# Patient Record
Sex: MALE | Race: WHITE | Employment: UNEMPLOYED | URBAN - METROPOLITAN AREA
[De-identification: names, ages, dates, MRNs, and addresses within clinical notes are randomized per-mention and may not be internally consistent; named-entity substitution may affect disease eponyms.]

---

## 2017-01-11 DIAGNOSIS — Z79.01 ANTICOAGULANT LONG-TERM USE: ICD-10-CM

## 2017-01-11 LAB
INR BLD: 2.5
PROTHROMBIN TIME: 28.2 SEC (ref 8.1–13.7)

## 2017-01-18 ENCOUNTER — TELEPHONE (OUTPATIENT)
Dept: PRIMARY CARE CLINIC | Age: 42
End: 2017-01-18

## 2017-01-30 ENCOUNTER — OFFICE VISIT (OUTPATIENT)
Dept: SURGERY | Age: 42
End: 2017-01-30

## 2017-01-30 VITALS
SYSTOLIC BLOOD PRESSURE: 136 MMHG | HEART RATE: 72 BPM | DIASTOLIC BLOOD PRESSURE: 86 MMHG | HEIGHT: 68 IN | BODY MASS INDEX: 26.07 KG/M2 | WEIGHT: 172 LBS

## 2017-01-30 DIAGNOSIS — E10.42 DIABETIC POLYNEUROPATHY ASSOCIATED WITH TYPE 1 DIABETES MELLITUS (HCC): ICD-10-CM

## 2017-01-30 DIAGNOSIS — E10.9 TYPE 1 DIABETES MELLITUS WITHOUT COMPLICATION (HCC): Primary | ICD-10-CM

## 2017-01-30 PROCEDURE — 99213 OFFICE O/P EST LOW 20 MIN: CPT | Performed by: INTERNAL MEDICINE

## 2017-01-30 PROCEDURE — 1036F TOBACCO NON-USER: CPT | Performed by: INTERNAL MEDICINE

## 2017-01-30 PROCEDURE — 82962 GLUCOSE BLOOD TEST: CPT | Performed by: INTERNAL MEDICINE

## 2017-01-30 PROCEDURE — 3045F PR MOST RECENT HEMOGLOBIN A1C LEVEL 7.0-9.0%: CPT | Performed by: INTERNAL MEDICINE

## 2017-01-30 PROCEDURE — G8427 DOCREV CUR MEDS BY ELIG CLIN: HCPCS | Performed by: INTERNAL MEDICINE

## 2017-01-30 PROCEDURE — G8419 CALC BMI OUT NRM PARAM NOF/U: HCPCS | Performed by: INTERNAL MEDICINE

## 2017-01-30 PROCEDURE — G8484 FLU IMMUNIZE NO ADMIN: HCPCS | Performed by: INTERNAL MEDICINE

## 2017-01-30 RX ORDER — GABAPENTIN 300 MG/1
300 CAPSULE ORAL 3 TIMES DAILY
Qty: 90 CAPSULE | Refills: 6 | Status: SHIPPED | OUTPATIENT
Start: 2017-01-30 | End: 2021-04-06

## 2017-01-30 ASSESSMENT — ENCOUNTER SYMPTOMS: VISUAL CHANGE: 1

## 2017-02-28 ENCOUNTER — HOSPITAL ENCOUNTER (OUTPATIENT)
Dept: ULTRASOUND IMAGING | Age: 42
Discharge: HOME | End: 2017-02-28
Payer: COMMERCIAL

## 2017-02-28 DIAGNOSIS — I77.1 ILIAC ARTERY STENOSIS, BILATERAL (HCC): ICD-10-CM

## 2017-02-28 DIAGNOSIS — Z79.01 ANTICOAGULANT LONG-TERM USE: ICD-10-CM

## 2017-02-28 LAB
INR BLD: 3.8
PROTHROMBIN TIME: 42.4 SEC (ref 8.1–13.7)

## 2017-02-28 PROCEDURE — 93923 UPR/LXTR ART STDY 3+ LVLS: CPT

## 2017-03-01 PROCEDURE — 93923 UPR/LXTR ART STDY 3+ LVLS: CPT | Performed by: INTERNAL MEDICINE

## 2017-03-07 ENCOUNTER — TELEPHONE (OUTPATIENT)
Dept: PRIMARY CARE CLINIC | Age: 42
End: 2017-03-07

## 2017-03-07 ENCOUNTER — ANTI-COAG VISIT (OUTPATIENT)
Dept: PRIMARY CARE CLINIC | Age: 42
End: 2017-03-07

## 2017-03-28 DIAGNOSIS — Z79.01 ANTICOAGULANT LONG-TERM USE: ICD-10-CM

## 2017-03-28 LAB
INR BLD: 2.3
PROTHROMBIN TIME: 24.9 SEC (ref 8.1–13.7)

## 2017-03-29 ENCOUNTER — ANTI-COAG VISIT (OUTPATIENT)
Dept: PRIMARY CARE CLINIC | Age: 42
End: 2017-03-29

## 2017-04-03 DIAGNOSIS — E78.00 HYPERCHOLESTEREMIA: ICD-10-CM

## 2017-04-03 RX ORDER — LISINOPRIL 10 MG/1
TABLET ORAL
Qty: 90 TABLET | Refills: 3 | Status: SHIPPED | OUTPATIENT
Start: 2017-04-03 | End: 2018-02-12 | Stop reason: SDUPTHER

## 2017-05-19 DIAGNOSIS — Z79.01 ANTICOAGULANT LONG-TERM USE: ICD-10-CM

## 2017-05-19 LAB
INR BLD: 2.4
PROTHROMBIN TIME: 26.4 SEC (ref 8.1–13.7)

## 2017-05-24 ENCOUNTER — ANTI-COAG VISIT (OUTPATIENT)
Dept: PRIMARY CARE CLINIC | Age: 42
End: 2017-05-24

## 2017-05-26 DIAGNOSIS — E78.00 HYPERCHOLESTEREMIA: ICD-10-CM

## 2017-05-26 RX ORDER — SIMVASTATIN 20 MG
TABLET ORAL
Qty: 30 TABLET | Refills: 6 | Status: SHIPPED | OUTPATIENT
Start: 2017-05-26 | End: 2018-02-12 | Stop reason: SDUPTHER

## 2017-06-08 ENCOUNTER — OFFICE VISIT (OUTPATIENT)
Dept: PRIMARY CARE CLINIC | Age: 42
End: 2017-06-08

## 2017-06-08 VITALS
SYSTOLIC BLOOD PRESSURE: 122 MMHG | HEART RATE: 76 BPM | RESPIRATION RATE: 14 BRPM | HEIGHT: 68 IN | BODY MASS INDEX: 26.42 KG/M2 | OXYGEN SATURATION: 99 % | WEIGHT: 174.3 LBS | DIASTOLIC BLOOD PRESSURE: 82 MMHG | TEMPERATURE: 98.3 F

## 2017-06-08 DIAGNOSIS — I63.89 CEREBROVASCULAR ACCIDENT (CVA) DUE TO OTHER MECHANISM (HCC): ICD-10-CM

## 2017-06-08 PROCEDURE — 1036F TOBACCO NON-USER: CPT | Performed by: INTERNAL MEDICINE

## 2017-06-08 PROCEDURE — G8427 DOCREV CUR MEDS BY ELIG CLIN: HCPCS | Performed by: INTERNAL MEDICINE

## 2017-06-08 PROCEDURE — G8419 CALC BMI OUT NRM PARAM NOF/U: HCPCS | Performed by: INTERNAL MEDICINE

## 2017-06-08 PROCEDURE — 99213 OFFICE O/P EST LOW 20 MIN: CPT | Performed by: INTERNAL MEDICINE

## 2017-06-08 RX ORDER — WARFARIN SODIUM 5 MG/1
10 TABLET ORAL DAILY
Qty: 180 TABLET | Refills: 2 | Status: SHIPPED | OUTPATIENT
Start: 2017-06-08 | End: 2017-07-05 | Stop reason: SDUPTHER

## 2017-06-08 ASSESSMENT — PATIENT HEALTH QUESTIONNAIRE - PHQ9
SUM OF ALL RESPONSES TO PHQ9 QUESTIONS 1 & 2: 0
1. LITTLE INTEREST OR PLEASURE IN DOING THINGS: 0
2. FEELING DOWN, DEPRESSED OR HOPELESS: 0
SUM OF ALL RESPONSES TO PHQ QUESTIONS 1-9: 0

## 2017-06-11 ASSESSMENT — ENCOUNTER SYMPTOMS
TROUBLE SWALLOWING: 0
SHORTNESS OF BREATH: 0
PHOTOPHOBIA: 0
VOICE CHANGE: 0
VOMITING: 0
NAUSEA: 0
CHOKING: 0

## 2017-07-03 ENCOUNTER — ANTI-COAG VISIT (OUTPATIENT)
Dept: PRIMARY CARE CLINIC | Age: 42
End: 2017-07-03

## 2017-07-03 DIAGNOSIS — Z79.01 ANTICOAGULANT LONG-TERM USE: ICD-10-CM

## 2017-07-03 LAB
INR BLD: 2
PROTHROMBIN TIME: 22.1 SEC (ref 8.1–13.7)

## 2017-07-19 DIAGNOSIS — E10.9 TYPE 1 DIABETES MELLITUS WITHOUT COMPLICATION (HCC): ICD-10-CM

## 2017-07-19 LAB
ANION GAP SERPL CALCULATED.3IONS-SCNC: 11 MEQ/L (ref 7–13)
BUN BLDV-MCNC: 11 MG/DL (ref 6–20)
CALCIUM SERPL-MCNC: 9.1 MG/DL (ref 8.6–10.2)
CHLORIDE BLD-SCNC: 102 MEQ/L (ref 98–107)
CO2: 27 MEQ/L (ref 22–29)
CREAT SERPL-MCNC: 0.47 MG/DL (ref 0.7–1.2)
CREATININE URINE: 95.6 MG/DL
GFR AFRICAN AMERICAN: >60
GFR NON-AFRICAN AMERICAN: >60
GLUCOSE BLD-MCNC: 119 MG/DL (ref 74–109)
HBA1C MFR BLD: 7.8 % (ref 4.8–5.9)
MICROALBUMIN UR-MCNC: <1.2 MG/DL
MICROALBUMIN/CREAT UR-RTO: NORMAL MG/G (ref 0–30)
POTASSIUM SERPL-SCNC: 4.2 MEQ/L (ref 3.5–5.1)
SODIUM BLD-SCNC: 140 MEQ/L (ref 132–144)

## 2017-08-09 ENCOUNTER — OFFICE VISIT (OUTPATIENT)
Dept: SURGERY | Age: 42
End: 2017-08-09

## 2017-08-09 VITALS
HEIGHT: 68 IN | SYSTOLIC BLOOD PRESSURE: 139 MMHG | HEART RATE: 81 BPM | BODY MASS INDEX: 26.52 KG/M2 | WEIGHT: 175 LBS | DIASTOLIC BLOOD PRESSURE: 89 MMHG

## 2017-08-09 DIAGNOSIS — E10.9 TYPE 1 DIABETES MELLITUS WITHOUT COMPLICATION (HCC): Primary | ICD-10-CM

## 2017-08-09 DIAGNOSIS — I73.9 CLAUDICATION OF BOTH LOWER EXTREMITIES (HCC): ICD-10-CM

## 2017-08-09 DIAGNOSIS — Z79.01 ANTICOAGULANT LONG-TERM USE: ICD-10-CM

## 2017-08-09 LAB
GLUCOSE BLD-MCNC: 126 MG/DL
INR BLD: 2.2
PROTHROMBIN TIME: 22.8 SEC (ref 8.1–13.7)

## 2017-08-09 PROCEDURE — 82962 GLUCOSE BLOOD TEST: CPT | Performed by: INTERNAL MEDICINE

## 2017-08-09 PROCEDURE — G8598 ASA/ANTIPLAT THER USED: HCPCS | Performed by: INTERNAL MEDICINE

## 2017-08-09 PROCEDURE — 3046F HEMOGLOBIN A1C LEVEL >9.0%: CPT | Performed by: INTERNAL MEDICINE

## 2017-08-09 PROCEDURE — G8419 CALC BMI OUT NRM PARAM NOF/U: HCPCS | Performed by: INTERNAL MEDICINE

## 2017-08-09 PROCEDURE — 1036F TOBACCO NON-USER: CPT | Performed by: INTERNAL MEDICINE

## 2017-08-09 PROCEDURE — G8427 DOCREV CUR MEDS BY ELIG CLIN: HCPCS | Performed by: INTERNAL MEDICINE

## 2017-08-09 PROCEDURE — 99213 OFFICE O/P EST LOW 20 MIN: CPT | Performed by: INTERNAL MEDICINE

## 2017-08-09 ASSESSMENT — ENCOUNTER SYMPTOMS: VISUAL CHANGE: 1

## 2017-08-14 RX ORDER — INSULIN DETEMIR 100 [IU]/ML
INJECTION, SOLUTION SUBCUTANEOUS
Qty: 10 ML | Refills: 2 | Status: SHIPPED | OUTPATIENT
Start: 2017-08-14 | End: 2018-05-18 | Stop reason: SDUPTHER

## 2017-09-22 ENCOUNTER — ANTI-COAG VISIT (OUTPATIENT)
Dept: PRIMARY CARE CLINIC | Age: 42
End: 2017-09-22

## 2017-09-22 DIAGNOSIS — Z79.01 ANTICOAGULANT LONG-TERM USE: ICD-10-CM

## 2017-09-22 LAB
INR BLD: 2.5
PROTHROMBIN TIME: 26.1 SEC (ref 8.1–13.7)

## 2017-09-27 ENCOUNTER — OFFICE VISIT (OUTPATIENT)
Dept: PRIMARY CARE CLINIC | Age: 42
End: 2017-09-27

## 2017-09-27 VITALS
WEIGHT: 177 LBS | DIASTOLIC BLOOD PRESSURE: 68 MMHG | BODY MASS INDEX: 26.83 KG/M2 | OXYGEN SATURATION: 98 % | TEMPERATURE: 98.2 F | SYSTOLIC BLOOD PRESSURE: 118 MMHG | HEART RATE: 80 BPM | RESPIRATION RATE: 16 BRPM | HEIGHT: 68 IN

## 2017-09-27 DIAGNOSIS — Z11.1 VISIT FOR MANTOUX TEST: ICD-10-CM

## 2017-09-27 DIAGNOSIS — M05.9 SEROPOSITIVE RHEUMATOID ARTHRITIS (HCC): ICD-10-CM

## 2017-09-27 DIAGNOSIS — E53.8 FOLATE DEFICIENCY: ICD-10-CM

## 2017-09-27 DIAGNOSIS — R07.89 OTHER CHEST PAIN: ICD-10-CM

## 2017-09-27 DIAGNOSIS — R53.83 FATIGUE, UNSPECIFIED TYPE: ICD-10-CM

## 2017-09-27 DIAGNOSIS — R06.02 SOB (SHORTNESS OF BREATH): ICD-10-CM

## 2017-09-27 DIAGNOSIS — E55.9 VITAMIN D DEFICIENCY: ICD-10-CM

## 2017-09-27 DIAGNOSIS — I65.23 BILATERAL CAROTID ARTERY STENOSIS: ICD-10-CM

## 2017-09-27 DIAGNOSIS — R06.02 SOB (SHORTNESS OF BREATH): Primary | ICD-10-CM

## 2017-09-27 DIAGNOSIS — R42 DIZZY: ICD-10-CM

## 2017-09-27 PROBLEM — R07.9 CHEST PAIN OF UNCERTAIN ETIOLOGY: Status: ACTIVE | Noted: 2017-09-27

## 2017-09-27 LAB
ALBUMIN SERPL-MCNC: 4.6 G/DL (ref 3.9–4.9)
ALP BLD-CCNC: 57 U/L (ref 35–104)
ALT SERPL-CCNC: 42 U/L (ref 0–41)
ANION GAP SERPL CALCULATED.3IONS-SCNC: 16 MEQ/L (ref 7–13)
AST SERPL-CCNC: 25 U/L (ref 0–40)
BASOPHILS ABSOLUTE: 0 K/UL (ref 0–0.2)
BASOPHILS RELATIVE PERCENT: 0.4 %
BILIRUB SERPL-MCNC: 0.3 MG/DL (ref 0–1.2)
BUN BLDV-MCNC: 12 MG/DL (ref 6–20)
CALCIUM SERPL-MCNC: 9.3 MG/DL (ref 8.6–10.2)
CHLORIDE BLD-SCNC: 98 MEQ/L (ref 98–107)
CO2: 24 MEQ/L (ref 22–29)
CREAT SERPL-MCNC: 0.46 MG/DL (ref 0.7–1.2)
EOSINOPHILS ABSOLUTE: 0.1 K/UL (ref 0–0.7)
EOSINOPHILS RELATIVE PERCENT: 3.4 %
FOLATE: 8.3 NG/ML (ref 7.3–26.1)
GFR AFRICAN AMERICAN: >60
GFR NON-AFRICAN AMERICAN: >60
GLOBULIN: 2.7 G/DL (ref 2.3–3.5)
GLUCOSE BLD-MCNC: 286 MG/DL (ref 74–109)
HCT VFR BLD CALC: 46.1 % (ref 42–52)
HEMOGLOBIN: 15.3 G/DL (ref 14–18)
LYMPHOCYTES ABSOLUTE: 1.3 K/UL (ref 1–4.8)
LYMPHOCYTES RELATIVE PERCENT: 32.6 %
MCH RBC QN AUTO: 31.6 PG (ref 27–31.3)
MCHC RBC AUTO-ENTMCNC: 33.3 % (ref 33–37)
MCV RBC AUTO: 94.9 FL (ref 80–100)
MONOCYTES ABSOLUTE: 0.4 K/UL (ref 0.2–0.8)
MONOCYTES RELATIVE PERCENT: 9.7 %
NEUTROPHILS ABSOLUTE: 2.1 K/UL (ref 1.4–6.5)
NEUTROPHILS RELATIVE PERCENT: 53.9 %
PDW BLD-RTO: 13.6 % (ref 11.5–14.5)
PLATELET # BLD: 255 K/UL (ref 130–400)
POTASSIUM SERPL-SCNC: 4.6 MEQ/L (ref 3.5–5.1)
RBC # BLD: 4.85 M/UL (ref 4.7–6.1)
RHEUMATOID FACTOR: <10 IU/ML (ref 0–14)
SLIDE REVIEW: ABNORMAL
SODIUM BLD-SCNC: 138 MEQ/L (ref 132–144)
TOTAL PROTEIN: 7.3 G/DL (ref 6.4–8.1)
VITAMIN B-12: 618 PG/ML (ref 211–946)
VITAMIN D 25-HYDROXY: 16.8 NG/ML (ref 30–100)
WBC # BLD: 3.8 K/UL (ref 4.8–10.8)

## 2017-09-27 PROCEDURE — 99214 OFFICE O/P EST MOD 30 MIN: CPT | Performed by: INTERNAL MEDICINE

## 2017-09-27 PROCEDURE — 1036F TOBACCO NON-USER: CPT | Performed by: INTERNAL MEDICINE

## 2017-09-27 PROCEDURE — 86580 TB INTRADERMAL TEST: CPT | Performed by: INTERNAL MEDICINE

## 2017-09-27 PROCEDURE — 93000 ELECTROCARDIOGRAM COMPLETE: CPT | Performed by: INTERNAL MEDICINE

## 2017-09-27 PROCEDURE — G8427 DOCREV CUR MEDS BY ELIG CLIN: HCPCS | Performed by: INTERNAL MEDICINE

## 2017-09-27 PROCEDURE — G8419 CALC BMI OUT NRM PARAM NOF/U: HCPCS | Performed by: INTERNAL MEDICINE

## 2017-09-27 PROCEDURE — G8598 ASA/ANTIPLAT THER USED: HCPCS | Performed by: INTERNAL MEDICINE

## 2017-09-27 RX ORDER — BRIMONIDINE TARTRATE 0.1 %
DROPS OPHTHALMIC (EYE)
Refills: 5 | COMMUNITY
Start: 2017-08-01 | End: 2018-02-12 | Stop reason: SDUPTHER

## 2017-09-27 RX ORDER — ALBUTEROL SULFATE 90 UG/1
2 AEROSOL, METERED RESPIRATORY (INHALATION) EVERY 6 HOURS PRN
Qty: 1 INHALER | Refills: 5 | Status: SHIPPED | OUTPATIENT
Start: 2017-09-27 | End: 2018-02-26

## 2017-09-27 RX ORDER — PREDNISOLONE ACETATE 10 MG/ML
SUSPENSION/ DROPS OPHTHALMIC
Refills: 6 | COMMUNITY
Start: 2017-09-14 | End: 2021-06-14 | Stop reason: ALTCHOICE

## 2017-09-27 RX ORDER — TIMOLOL MALEATE 5 MG/ML
SOLUTION/ DROPS OPHTHALMIC
Refills: 1 | COMMUNITY
Start: 2017-08-03 | End: 2021-06-14 | Stop reason: ALTCHOICE

## 2017-09-27 RX ORDER — BRIMONIDINE TARTRATE 2 MG/ML
SOLUTION/ DROPS OPHTHALMIC
Refills: 5 | COMMUNITY
Start: 2017-08-01 | End: 2018-02-12 | Stop reason: SDUPTHER

## 2017-09-27 RX ORDER — FLUTICASONE FUROATE AND VILANTEROL 100; 25 UG/1; UG/1
1 POWDER RESPIRATORY (INHALATION) DAILY
Qty: 1 EACH | Refills: 5 | Status: SHIPPED | OUTPATIENT
Start: 2017-09-27 | End: 2018-02-26

## 2017-09-28 ENCOUNTER — OFFICE VISIT (OUTPATIENT)
Dept: CARDIOLOGY | Age: 42
End: 2017-09-28

## 2017-09-28 VITALS
HEART RATE: 89 BPM | BODY MASS INDEX: 26.07 KG/M2 | OXYGEN SATURATION: 97 % | HEIGHT: 69 IN | WEIGHT: 176 LBS | SYSTOLIC BLOOD PRESSURE: 130 MMHG | DIASTOLIC BLOOD PRESSURE: 90 MMHG

## 2017-09-28 DIAGNOSIS — E10.9 TYPE 1 DIABETES MELLITUS WITHOUT COMPLICATION (HCC): ICD-10-CM

## 2017-09-28 DIAGNOSIS — R06.02 SOB (SHORTNESS OF BREATH): ICD-10-CM

## 2017-09-28 DIAGNOSIS — R07.9 CHEST PAIN OF UNCERTAIN ETIOLOGY: Primary | ICD-10-CM

## 2017-09-28 DIAGNOSIS — R42 DIZZINESS: ICD-10-CM

## 2017-09-28 LAB
ANA INTERPRETATION: NORMAL
ANTI-NUCLEAR ANTIBODY (ANA): NEGATIVE

## 2017-09-28 PROCEDURE — 99243 OFF/OP CNSLTJ NEW/EST LOW 30: CPT | Performed by: INTERNAL MEDICINE

## 2017-09-28 PROCEDURE — G8427 DOCREV CUR MEDS BY ELIG CLIN: HCPCS | Performed by: INTERNAL MEDICINE

## 2017-09-28 PROCEDURE — G8419 CALC BMI OUT NRM PARAM NOF/U: HCPCS | Performed by: INTERNAL MEDICINE

## 2017-09-28 PROCEDURE — 1036F TOBACCO NON-USER: CPT | Performed by: INTERNAL MEDICINE

## 2017-09-28 PROCEDURE — G8598 ASA/ANTIPLAT THER USED: HCPCS | Performed by: INTERNAL MEDICINE

## 2017-09-28 RX ORDER — HYDROCODONE BITARTRATE AND ACETAMINOPHEN 5; 325 MG/1; MG/1
TABLET ORAL
Refills: 0 | COMMUNITY
Start: 2017-09-26 | End: 2021-06-14 | Stop reason: ALTCHOICE

## 2017-09-28 ASSESSMENT — ENCOUNTER SYMPTOMS
NAUSEA: 0
CHEST TIGHTNESS: 0
VOMITING: 0
SHORTNESS OF BREATH: 0
COLOR CHANGE: 0
COUGH: 0
APNEA: 0

## 2017-09-29 ENCOUNTER — NURSE ONLY (OUTPATIENT)
Dept: PRIMARY CARE CLINIC | Age: 42
End: 2017-09-29

## 2017-09-29 DIAGNOSIS — Z11.1 VISIT FOR MANTOUX TEST: Primary | ICD-10-CM

## 2017-09-29 LAB
INDURATION: NORMAL
QUANTIFERON (R) TB GOLD (INCUBATED): NEGATIVE
QUANTIFERON MITOGEN: >10 IU/ML
QUANTIFERON NIL: 0.05 IU/ML
QUANTIFERON TB AG MINUS NIL: 0.01 IU/ML (ref 0–0.34)
TB SKIN TEST: NEGATIVE

## 2017-09-29 ASSESSMENT — ENCOUNTER SYMPTOMS
ABDOMINAL PAIN: 0
VOMITING: 0
RHINORRHEA: 0
CHOKING: 0
SHORTNESS OF BREATH: 1
NAUSEA: 0
HEMOPTYSIS: 0
VOICE CHANGE: 0
TROUBLE SWALLOWING: 0
PHOTOPHOBIA: 0

## 2017-10-10 ENCOUNTER — HOSPITAL ENCOUNTER (OUTPATIENT)
Dept: NON INVASIVE DIAGNOSTICS | Age: 42
Discharge: HOME | End: 2017-10-10
Payer: COMMERCIAL

## 2017-10-10 VITALS — DIASTOLIC BLOOD PRESSURE: 85 MMHG | SYSTOLIC BLOOD PRESSURE: 130 MMHG

## 2017-10-10 DIAGNOSIS — R06.02 SOB (SHORTNESS OF BREATH): ICD-10-CM

## 2017-10-10 DIAGNOSIS — R42 DIZZINESS: ICD-10-CM

## 2017-10-10 DIAGNOSIS — E10.9 TYPE 1 DIABETES MELLITUS WITHOUT COMPLICATION (HCC): ICD-10-CM

## 2017-10-10 DIAGNOSIS — R07.9 CHEST PAIN OF UNCERTAIN ETIOLOGY: ICD-10-CM

## 2017-10-10 LAB
LV EF: 50 %
LVEF MODALITY: NORMAL

## 2017-10-10 PROCEDURE — 93018 CV STRESS TEST I&R ONLY: CPT | Performed by: INTERNAL MEDICINE

## 2017-10-10 PROCEDURE — 93017 CV STRESS TEST TRACING ONLY: CPT

## 2017-10-10 PROCEDURE — 93016 CV STRESS TEST SUPVJ ONLY: CPT | Performed by: INTERNAL MEDICINE

## 2017-10-10 PROCEDURE — 93306 TTE W/DOPPLER COMPLETE: CPT

## 2017-10-10 PROCEDURE — 78452 HT MUSCLE IMAGE SPECT MULT: CPT | Performed by: INTERNAL MEDICINE

## 2017-10-11 NOTE — PROCEDURES
[address] [address]                             CARDIAC STRESS TEST    PATIENT NAME: Ashok Franklin                 :             1975  MED REC NO:   [id number]                             ROOM:  ACCOUNT NO:   [de-identified]                          ADMISSION DATE:  10/10/2017  PROVIDER:     Glenn Rodas MD      CARDIOVASCULAR DIAGNOSTIC DEPARTMENT    DATE OF STUDY:  10/10/2017    STRESS TESTING    INDICATION:  Chest pain. TECHNIQUE:  The patient was exercised according to Bravo protocol. RESULTS:  He exercised for 8.5 minutes achieving workload of 10.1  METs, which is good. Resting EKG revealed sinus rhythm, sinus  tachycardia at 103 beats per minute. Maximum heart rate achieved was  166 beats per minute, which is 93% of predicted maximum heart rate. Blood pressure response was normal.  Maximum systolic blood pressure  371 mmHg. No diagnostic ST-T wave changes were seen. No significant  arrhythmias were noted. IMPRESSION:  1. Negative stress test for ischemia. 2.  Adequate exercise tolerance. 3.  Normal hemodynamic response.         Prudencio Jauregui MD    D: 10/11/2017 14:52:40       T: 10/11/2017 16:27:49     [id number]  Job#: [id number]     Doc#: [id number]    CC:  Dr. Leopold March

## 2017-10-12 ENCOUNTER — HOSPITAL ENCOUNTER (OUTPATIENT)
Dept: ULTRASOUND IMAGING | Age: 42
Discharge: HOME | End: 2017-10-12
Payer: COMMERCIAL

## 2017-10-12 DIAGNOSIS — I65.23 BILATERAL CAROTID ARTERY STENOSIS: ICD-10-CM

## 2017-10-12 PROCEDURE — 93880 EXTRACRANIAL BILAT STUDY: CPT

## 2017-10-26 ENCOUNTER — OFFICE VISIT (OUTPATIENT)
Dept: CARDIOLOGY | Age: 42
End: 2017-10-26

## 2017-10-26 VITALS
OXYGEN SATURATION: 96 % | WEIGHT: 178 LBS | SYSTOLIC BLOOD PRESSURE: 126 MMHG | HEART RATE: 86 BPM | BODY MASS INDEX: 26.36 KG/M2 | DIASTOLIC BLOOD PRESSURE: 80 MMHG | HEIGHT: 69 IN

## 2017-10-26 DIAGNOSIS — R42 DIZZINESS: ICD-10-CM

## 2017-10-26 DIAGNOSIS — R07.9 CHEST PAIN OF UNCERTAIN ETIOLOGY: Primary | ICD-10-CM

## 2017-10-26 DIAGNOSIS — R06.02 SOB (SHORTNESS OF BREATH): ICD-10-CM

## 2017-10-26 DIAGNOSIS — R53.83 FATIGUE, UNSPECIFIED TYPE: ICD-10-CM

## 2017-10-26 DIAGNOSIS — R20.0 BILATERAL HAND NUMBNESS: ICD-10-CM

## 2017-10-26 DIAGNOSIS — R20.0 BILATERAL LEG NUMBNESS: ICD-10-CM

## 2017-10-26 PROCEDURE — G8419 CALC BMI OUT NRM PARAM NOF/U: HCPCS | Performed by: INTERNAL MEDICINE

## 2017-10-26 PROCEDURE — G8427 DOCREV CUR MEDS BY ELIG CLIN: HCPCS | Performed by: INTERNAL MEDICINE

## 2017-10-26 PROCEDURE — G8484 FLU IMMUNIZE NO ADMIN: HCPCS | Performed by: INTERNAL MEDICINE

## 2017-10-26 PROCEDURE — 1036F TOBACCO NON-USER: CPT | Performed by: INTERNAL MEDICINE

## 2017-10-26 PROCEDURE — G8598 ASA/ANTIPLAT THER USED: HCPCS | Performed by: INTERNAL MEDICINE

## 2017-10-26 PROCEDURE — 99214 OFFICE O/P EST MOD 30 MIN: CPT | Performed by: INTERNAL MEDICINE

## 2017-10-26 RX ORDER — ERGOCALCIFEROL 1.25 MG/1
CAPSULE ORAL
Refills: 3 | COMMUNITY
Start: 2017-09-28 | End: 2021-06-14 | Stop reason: ALTCHOICE

## 2017-10-26 RX ORDER — ADALIMUMAB 40MG/0.8ML
KIT SUBCUTANEOUS
COMMUNITY
Start: 2017-10-23 | End: 2022-01-29

## 2017-10-26 RX ORDER — METOPROLOL SUCCINATE 50 MG/1
50 TABLET, EXTENDED RELEASE ORAL NIGHTLY
Qty: 90 TABLET | Refills: 3 | Status: SHIPPED | OUTPATIENT
Start: 2017-10-26 | End: 2018-02-26

## 2017-10-26 ASSESSMENT — PATIENT HEALTH QUESTIONNAIRE - PHQ9
2. FEELING DOWN, DEPRESSED OR HOPELESS: 0
1. LITTLE INTEREST OR PLEASURE IN DOING THINGS: 0
SUM OF ALL RESPONSES TO PHQ9 QUESTIONS 1 & 2: 0
SUM OF ALL RESPONSES TO PHQ QUESTIONS 1-9: 0

## 2017-10-26 ASSESSMENT — ENCOUNTER SYMPTOMS
APNEA: 0
SHORTNESS OF BREATH: 1
COLOR CHANGE: 0
CHEST TIGHTNESS: 1

## 2017-10-26 NOTE — PROGRESS NOTES
Reason For Visit:  Chief Complaint   Patient presents with    Results     Stress Test and ECHO       HPI:  10/26/17: Patient presents today for Follow-up of chest pain. Stress is negative for ischemia. He has a long-standing history of diabetes. I will start him on Lopressor 35 twice a day. He is mildly tachycardiac that will help the heart rate go down. I still have moderate suspicion for coronary artery disease despite the negative stress test. If he has recurrent chest pain consider cardiac catheterization or cardiac CTA    9/28/17: Patient presents today for Evaluation of chest pain. He is a very pleasant middle-age man who has been diabetic for long period of time. Has diabetic retinopathy. Current smoker hypertension and hyperlipidemia. He complains of chest pain left-sided chest location of his eye pressure. No nausea vomiting no definite radiation. He does not have any nephropathy as far as he knows he is a moderately high risk for coronary artery disease. We'll set him up for stress Cardiolite and echocardiogram and then see me.     Problem List:  Patient Active Problem List   Diagnosis    Type 1 diabetes mellitus without complication ([facility]    Hypercholesteremia    Diabetic retinopathy ([facility]    Chest pain of uncertain etiology    Dizziness    SOB (shortness of breath)       Allergies:  No Known Allergies    Personal History:   has a past medical history of Chest pain of uncertain etiology; Dizziness; DVT of leg (deep venous thrombosis) ([address] Hyperlipidemia; Hypertension; Rheumatoid arthritis ([address] Smoker; SOB (shortness of breath); Stroke [facility]); and Type 1 diabetes mellitus, uncontrolled ([facility] Social History:   reports that he has quit smoking. He has never used smokeless tobacco.    Surgical History:  History reviewed. No pertinent surgical history. Family History:  family history is not on file.       Current Medications:    Current Outpatient Prescriptions:     vitamin D (ERGOCALCIFEROL) 25869 units CAPS capsule, TAKE 1 CAPSULE BY MOUTH ONCE A WEEK, Disp: , Rfl: 3    HUMIRA PEN 40 MG/0.8ML injection, , Disp: , Rfl:     metoprolol succinate (TOPROL XL) 50 MG extended release tablet, Take 1 tablet by mouth nightly, Disp: 90 tablet, Rfl: 3    HYDROcodone-acetaminophen (NORCO) 5-325 MG per tablet, TAKE ONE TABLET BY MOUTH EVERY DAY, Disp: , Rfl: 0    timolol (TIMOPTIC) 0.5 % ophthalmic solution, INSTILL ONE DROP INTO EACH EYE TWICE DAILY, Disp: , Rfl: 1    prednisoLONE acetate (PRED FORTE) 1 % ophthalmic suspension, INSTILL 1 DROP INTO BOTH EYES 3 TIMES A DAY AS DIRECTED.  USE PRE AND POST INTRAVITREAL INJECTIONS., Disp: , Rfl: 6    ALPHAGAN P 0.1 % SOLN, INSTILL ONE DROP INTO EACH EYE TWO TIMES A DAY as directed, Disp: , Rfl: 5    brimonidine (ALPHAGAN) 0.2 % ophthalmic solution, INSTILL ONE DROP INTO EACH EYE TWO TIMES A DAY, Disp: , Rfl: 5    Insulin Pump Accessories MISC, by Does not apply route, Disp: , Rfl:     fluticasone-vilanterol (BREO ELLIPTA) 100-25 MCG/INH AEPB inhaler, Inhale 1 puff into the lungs daily, Disp: 1 each, Rfl: 5    albuterol sulfate HFA (PROAIR HFA) 108 (90 Base) MCG/ACT inhaler, Inhale 2 puffs into the lungs every 6 hours as needed for Wheezing, Disp: 1 Inhaler, Rfl: 5    LEVEMIR 100 UNIT/ML injection vial, inject 25 units nightly, Disp: 10 mL, Rfl: 2    folic acid (FOLVITE) 1 MG tablet, Take 1 tablet by mouth daily, Disp: 90 tablet, Rfl: 1    warfarin (COUMADIN) 5 MG tablet, Take 2 tablets by mouth daily Take as directed, Disp: 180 tablet, Rfl: 1    simvastatin (ZOCOR) 20 MG tablet, TAKE ONE (1) TABLET BY MOUTH EVERY EVENING, Disp: 30 tablet, Rfl: 06    insulin lispro (HUMALOG) 100 UNIT/ML injection vial, INJECT PER INSULIN PUMP, MAXIMUM  UNITS PER DAY please give 3 vials for a 30 day supply, Disp: 3 vial, Rfl: 5    lisinopril (PRINIVIL;ZESTRIL) 10 MG tablet, TAKE ONE TABLET BY MOUTH EVERY DAY, Disp: 90 tablet, Rfl: 03    fluticasone (FLONASE) 50 MCG/ACT nasal spray, 1 spray by Nasal route daily, Disp: 1 Bottle, Rfl: 5    gabapentin (NEURONTIN) 300 MG capsule, Take 1 capsule by mouth 3 times daily, Disp: 90 capsule, Rfl: 06    metFORMIN (GLUCOPHAGE) 500 MG tablet, Take 1 tablet by mouth 3 times daily, Disp: 270 tablet, Rfl: 03    traMADol (ULTRAM) 50 MG tablet, take one or two tabs three times a day with Tylenol, Disp: , Rfl:     ALPHAGAN P 0.15 % ophthalmic solution, Place 1 drop into both eyes daily. , Disp: , Rfl:     Ranibizumab 0.3 MG/0.05ML SOLN, Gets from ophthalmologist, Disp: 1 vial, Rfl: 00    latanoprost (XALATAN) 0.005 % ophthalmic solution, Place 1 drop into both eyes. , Disp: , Rfl:     methotrexate 25 MG/ML injection, Inject 12.5 mg into the muscle once.  , Disp: , Rfl:     nortriptyline (PAMELOR) 25 MG capsule, Take 25 mg by mouth nightly.  , Disp: , Rfl:       Review of Systems:  Review of Systems   Constitutional: Positive for fatigue. Negative for appetite change and diaphoresis. Respiratory: Positive for chest tightness and shortness of breath. Negative for apnea. Cardiovascular: Positive for chest pain. Negative for palpitations and leg swelling. Skin: Negative for color change, pallor, rash and wound. Neurological: Positive for numbness. Negative for dizziness, syncope, weakness, light-headedness and headaches. Legs and hands   Psychiatric/Behavioral: Negative for agitation, behavioral problems and confusion. The patient is not nervous/anxious and is not hyperactive. All other systems reviewed and are negative. Objective  Vitals:    10/26/17 0940   BP: 126/80   Site: Right Arm   Position: Sitting   Cuff Size: Medium Adult   Pulse: 86   SpO2: 96%   Weight: 178 lb (80.7 kg)   Height: 5' 9\" (1.753 m)         Physical Exam:  Physical Exam   Constitutional: He is oriented to person, place, and time. He appears well-developed and well-nourished. HENT:   Head: Normocephalic and atraumatic.    Right Ear: room.  > Follow up as discussed or call office sooner if needed.  > If refills are needed after appointment contact pharmacy.         Electronically signed by: Jessica Pineda MD  10/26/2017 10:02 AM

## 2017-11-14 ENCOUNTER — ANTI-COAG VISIT (OUTPATIENT)
Dept: PRIMARY CARE CLINIC | Age: 42
End: 2017-11-14

## 2017-11-14 DIAGNOSIS — Z79.01 ANTICOAGULANT LONG-TERM USE: ICD-10-CM

## 2017-11-14 LAB
INR BLD: 2.4
PROTHROMBIN TIME: 25 SEC (ref 8.1–13.7)

## 2017-11-20 ENCOUNTER — OFFICE VISIT (OUTPATIENT)
Dept: PULMONOLOGY | Age: 42
End: 2017-11-20

## 2017-11-20 VITALS
HEIGHT: 69 IN | HEART RATE: 79 BPM | BODY MASS INDEX: 26.75 KG/M2 | DIASTOLIC BLOOD PRESSURE: 84 MMHG | OXYGEN SATURATION: 98 % | SYSTOLIC BLOOD PRESSURE: 118 MMHG | TEMPERATURE: 97.4 F | WEIGHT: 180.6 LBS

## 2017-11-20 DIAGNOSIS — R06.02 SHORTNESS OF BREATH: Primary | ICD-10-CM

## 2017-11-20 PROCEDURE — G8419 CALC BMI OUT NRM PARAM NOF/U: HCPCS | Performed by: INTERNAL MEDICINE

## 2017-11-20 PROCEDURE — 99204 OFFICE O/P NEW MOD 45 MIN: CPT | Performed by: INTERNAL MEDICINE

## 2017-11-20 PROCEDURE — G8484 FLU IMMUNIZE NO ADMIN: HCPCS | Performed by: INTERNAL MEDICINE

## 2017-11-20 PROCEDURE — G8598 ASA/ANTIPLAT THER USED: HCPCS | Performed by: INTERNAL MEDICINE

## 2017-11-20 PROCEDURE — G8427 DOCREV CUR MEDS BY ELIG CLIN: HCPCS | Performed by: INTERNAL MEDICINE

## 2017-11-20 PROCEDURE — 1036F TOBACCO NON-USER: CPT | Performed by: INTERNAL MEDICINE

## 2017-11-20 ASSESSMENT — ENCOUNTER SYMPTOMS
ABDOMINAL PAIN: 0
VOMITING: 0
SINUS PRESSURE: 0
COUGH: 0
NAUSEA: 0
SORE THROAT: 0
DIARRHEA: 0
WHEEZING: 0
RHINORRHEA: 0
SHORTNESS OF BREATH: 1
CHEST TIGHTNESS: 0

## 2017-12-13 ENCOUNTER — HOSPITAL ENCOUNTER (OUTPATIENT)
Dept: CT IMAGING | Age: 42
Discharge: HOME | End: 2017-12-13
Payer: COMMERCIAL

## 2017-12-13 ENCOUNTER — HOSPITAL ENCOUNTER (OUTPATIENT)
Dept: PULMONOLOGY | Age: 42
Discharge: HOME | End: 2017-12-13
Payer: COMMERCIAL

## 2017-12-13 DIAGNOSIS — R06.02 SHORTNESS OF BREATH: ICD-10-CM

## 2017-12-13 PROCEDURE — 94060 EVALUATION OF WHEEZING: CPT

## 2017-12-13 PROCEDURE — 94726 PLETHYSMOGRAPHY LUNG VOLUMES: CPT

## 2017-12-13 PROCEDURE — 6360000002 HC RX W HCPCS: Performed by: INTERNAL MEDICINE

## 2017-12-13 PROCEDURE — 94729 DIFFUSING CAPACITY: CPT

## 2017-12-13 PROCEDURE — 71250 CT THORAX DX C-: CPT

## 2017-12-13 RX ORDER — ALBUTEROL SULFATE 2.5 MG/3ML
2.5 SOLUTION RESPIRATORY (INHALATION) ONCE
Status: COMPLETED | OUTPATIENT
Start: 2017-12-13 | End: 2017-12-13

## 2017-12-13 RX ADMIN — ALBUTEROL SULFATE 2.5 MG: 2.5 SOLUTION RESPIRATORY (INHALATION) at 13:43

## 2017-12-18 LAB
INR BLD: 2.3
PROTHROMBIN TIME: 24 SEC (ref 8.1–13.7)

## 2017-12-27 PROCEDURE — 94060 EVALUATION OF WHEEZING: CPT | Performed by: INTERNAL MEDICINE

## 2017-12-27 PROCEDURE — 94726 PLETHYSMOGRAPHY LUNG VOLUMES: CPT | Performed by: INTERNAL MEDICINE

## 2017-12-27 PROCEDURE — 94729 DIFFUSING CAPACITY: CPT | Performed by: INTERNAL MEDICINE

## 2018-01-17 ENCOUNTER — ANTI-COAG VISIT (OUTPATIENT)
Dept: PRIMARY CARE CLINIC | Age: 43
End: 2018-01-17

## 2018-01-17 LAB
INR BLD: 2.6
PROTHROMBIN TIME: 27.4 SEC (ref 8.1–13.7)

## 2018-01-19 ENCOUNTER — TELEPHONE (OUTPATIENT)
Dept: PRIMARY CARE CLINIC | Age: 43
End: 2018-01-19

## 2018-01-19 ENCOUNTER — OFFICE VISIT (OUTPATIENT)
Dept: PULMONOLOGY | Age: 43
End: 2018-01-19

## 2018-01-19 VITALS
WEIGHT: 179 LBS | HEART RATE: 96 BPM | OXYGEN SATURATION: 98 % | BODY MASS INDEX: 26.51 KG/M2 | TEMPERATURE: 96.2 F | DIASTOLIC BLOOD PRESSURE: 84 MMHG | RESPIRATION RATE: 16 BRPM | HEIGHT: 69 IN | SYSTOLIC BLOOD PRESSURE: 122 MMHG

## 2018-01-19 DIAGNOSIS — G47.33 OBSTRUCTIVE SLEEP APNEA SYNDROME: ICD-10-CM

## 2018-01-19 DIAGNOSIS — K21.9 GASTROESOPHAGEAL REFLUX DISEASE, ESOPHAGITIS PRESENCE NOT SPECIFIED: ICD-10-CM

## 2018-01-19 DIAGNOSIS — R06.02 SHORTNESS OF BREATH: Primary | ICD-10-CM

## 2018-01-19 PROCEDURE — G8419 CALC BMI OUT NRM PARAM NOF/U: HCPCS | Performed by: INTERNAL MEDICINE

## 2018-01-19 PROCEDURE — 99214 OFFICE O/P EST MOD 30 MIN: CPT | Performed by: INTERNAL MEDICINE

## 2018-01-19 PROCEDURE — G8484 FLU IMMUNIZE NO ADMIN: HCPCS | Performed by: INTERNAL MEDICINE

## 2018-01-19 PROCEDURE — G8427 DOCREV CUR MEDS BY ELIG CLIN: HCPCS | Performed by: INTERNAL MEDICINE

## 2018-01-19 PROCEDURE — 1036F TOBACCO NON-USER: CPT | Performed by: INTERNAL MEDICINE

## 2018-01-19 ASSESSMENT — ENCOUNTER SYMPTOMS
SHORTNESS OF BREATH: 1
VOMITING: 0
NAUSEA: 0
CHEST TIGHTNESS: 1
DIARRHEA: 0
COUGH: 0
SINUS PRESSURE: 0
SORE THROAT: 0
WHEEZING: 0
ABDOMINAL PAIN: 0
RHINORRHEA: 0

## 2018-01-19 NOTE — PROGRESS NOTES
Subjective: Milena Navarro is a 36 y.o. male who complains today of:     Chief Complaint   Patient presents with    Shortness of Breath     6 week fu, review test results       HPI  Patient's here for a follow-up visit regarding shortness breath he presented to me with complaint of progressive dyspnea on exertion of unclear etiology. He had PFTs done since his initial visit which showed no obstructive or restrictive disease he had mild diffusion abnormality only. CT of the chest showed no significant interstitial disease he had a very small or minute lung nodule measuring 2.5 mm and another calcified nodule noted both are suggestive of minimal granulomatous lesions. Nocturnal oximetry testing was abnormal showing frequent cyclical oxygen desaturations consistent with underlying sleep apnea. Patient reports continued exertional dyspnea and episodes of chest discomfort periodically    Allergies:  Review of patient's allergies indicates no known allergies. Past Medical History:   Diagnosis Date    Chest pain of uncertain etiology 4/66/0008    Dizziness 9/28/2017    DVT of leg (deep venous thrombosis) (HCC)     x2    Hyperlipidemia     Hypertension     Rheumatoid arthritis ([facility]     dr Burrows  ht.  Smoker     quit 2008    SOB (shortness of breath) 9/28/2017    Stroke ([facility]     bleed    Type 1 diabetes mellitus, uncontrolled ([facility]      History reviewed. No pertinent surgical history. Family History   Problem Relation Age of Onset    Clotting Disorder Mother      blood clots     Social History     Social History    Marital status:      Spouse name: N/A    Number of children: N/A    Years of education: N/A     Occupational History    Not on file.      Social History Main Topics    Smoking status: Former Smoker     Quit date: 2007    Smokeless tobacco: Never Used    Alcohol use No    Drug use: No    Sexual activity: Not on file     Other Topics Concern    Not on file Social History Narrative    No narrative on file         Review of Systems   Constitutional: Negative for chills, diaphoresis, fatigue and fever. HENT: Negative for congestion, postnasal drip, rhinorrhea, sinus pressure, sneezing and sore throat. Eyes: Negative for visual disturbance. Respiratory: Positive for chest tightness and shortness of breath. Negative for cough and wheezing. Cardiovascular: Negative for chest pain, palpitations and leg swelling. Gastrointestinal: Negative for abdominal pain, diarrhea, nausea and vomiting. Genitourinary: Negative for difficulty urinating and hematuria. Musculoskeletal: Negative for arthralgias, joint swelling and myalgias. Skin: Negative for rash. Allergic/Immunologic: Negative for environmental allergies. Neurological: Negative for dizziness, tremors, weakness and headaches. Psychiatric/Behavioral: Negative for behavioral problems and sleep disturbance. Objective:     Vitals:    01/19/18 1033   BP: 122/84   Site: Left Arm   Position: Sitting   Cuff Size: Medium Adult   Pulse: 96   Resp: 16   Temp: 96.2 °F (35.7 °C)   TempSrc: Tympanic   SpO2: 98%   Weight: 179 lb (81.2 kg)   Height: 5' 9\" (1.753 m)         Physical Exam   Constitutional: He is oriented to person, place, and time. He appears well-developed and well-nourished. HENT:   Head: Normocephalic and atraumatic. Mouth/Throat: Oropharynx is clear and moist.   Eyes: EOM are normal. Pupils are equal, round, and reactive to light. Neck: Normal range of motion. Neck supple. No JVD present. No tracheal deviation present. No thyromegaly present. Cardiovascular: Normal rate and regular rhythm. Exam reveals no gallop. No murmur heard. Pulmonary/Chest: Effort normal and breath sounds normal. He has no wheezes. He has no rales. He exhibits no tenderness. Abdominal: He exhibits no distension. Musculoskeletal: Normal range of motion. He exhibits no edema.    Neurological: He is alert and oriented to person, place, and time. He has normal reflexes. Skin: Skin is warm and dry. No rash noted. Psychiatric: He has a normal mood and affect. Assessment:     1. Shortness of breath     2. Obstructive sleep apnea syndrome  Baseline Diagnostic Sleep Study   3. Gastroesophageal reflux disease, esophagitis presence not specified       Patient has clear evidence of sleep-disordered breathing by screening pulse oximetry. He may have esophageal spasms associated with nocturnal GERD contributing to his vague chest pains. We'll proceed with sleep evaluation and start treatment accordingly otherwise I'll see him for follow-up in 2 months      Plan:     Orders Placed This Encounter   Procedures    Baseline Diagnostic Sleep Study     Standing Status:   Future     Standing Expiration Date:   2/19/2018     Order Specific Question:   Adult or Pediatric     Answer:   Adult Study (>7 Years)     No orders of the defined types were placed in this encounter. Return in about 2 months (around 3/19/2018) for after sleep study, re-evaluation.       Lisette Scanlon MD

## 2018-02-07 ENCOUNTER — HOSPITAL ENCOUNTER (OUTPATIENT)
Dept: SLEEP CENTER | Age: 43
Discharge: HOME | End: 2018-02-09
Payer: COMMERCIAL

## 2018-02-07 PROCEDURE — 95810 POLYSOM 6/> YRS 4/> PARAM: CPT

## 2018-02-12 ENCOUNTER — OFFICE VISIT (OUTPATIENT)
Dept: ENDOCRINOLOGY | Age: 43
End: 2018-02-12
Payer: COMMERCIAL

## 2018-02-12 VITALS
DIASTOLIC BLOOD PRESSURE: 82 MMHG | SYSTOLIC BLOOD PRESSURE: 116 MMHG | HEIGHT: 69 IN | BODY MASS INDEX: 26.36 KG/M2 | WEIGHT: 178 LBS | HEART RATE: 84 BPM

## 2018-02-12 DIAGNOSIS — E78.00 HYPERCHOLESTEREMIA: ICD-10-CM

## 2018-02-12 LAB
GLUCOSE BLD-MCNC: 174 MG/DL
HBA1C MFR BLD: 7.2 %

## 2018-02-12 PROCEDURE — G8427 DOCREV CUR MEDS BY ELIG CLIN: HCPCS | Performed by: INTERNAL MEDICINE

## 2018-02-12 PROCEDURE — 1036F TOBACCO NON-USER: CPT | Performed by: INTERNAL MEDICINE

## 2018-02-12 PROCEDURE — 82962 GLUCOSE BLOOD TEST: CPT | Performed by: INTERNAL MEDICINE

## 2018-02-12 PROCEDURE — 99213 OFFICE O/P EST LOW 20 MIN: CPT | Performed by: INTERNAL MEDICINE

## 2018-02-12 PROCEDURE — 3045F PR MOST RECENT HEMOGLOBIN A1C LEVEL 7.0-9.0%: CPT | Performed by: INTERNAL MEDICINE

## 2018-02-12 PROCEDURE — G8419 CALC BMI OUT NRM PARAM NOF/U: HCPCS | Performed by: INTERNAL MEDICINE

## 2018-02-12 PROCEDURE — G8484 FLU IMMUNIZE NO ADMIN: HCPCS | Performed by: INTERNAL MEDICINE

## 2018-02-12 PROCEDURE — 83036 HEMOGLOBIN GLYCOSYLATED A1C: CPT | Performed by: INTERNAL MEDICINE

## 2018-02-12 RX ORDER — SIMVASTATIN 20 MG
TABLET ORAL
Qty: 90 TABLET | Refills: 6 | Status: SHIPPED | OUTPATIENT
Start: 2018-02-12 | End: 2020-07-15 | Stop reason: SDUPTHER

## 2018-02-12 RX ORDER — LISINOPRIL 10 MG/1
TABLET ORAL
Qty: 90 TABLET | Refills: 3 | Status: SHIPPED | OUTPATIENT
Start: 2018-02-12 | End: 2019-08-29 | Stop reason: SDUPTHER

## 2018-02-12 ASSESSMENT — ENCOUNTER SYMPTOMS
VISUAL CHANGE: 1
SHORTNESS OF BREATH: 1

## 2018-02-12 NOTE — PROGRESS NOTES
times daily, Disp: 90 capsule, Rfl: 06    metFORMIN (GLUCOPHAGE) 500 MG tablet, Take 1 tablet by mouth 3 times daily, Disp: 270 tablet, Rfl: 03    traMADol (ULTRAM) 50 MG tablet, take one or two tabs three times a day with Tylenol, Disp: , Rfl:     ALPHAGAN P 0.15 % ophthalmic solution, Place 1 drop into both eyes daily. , Disp: , Rfl:     Ranibizumab 0.3 MG/0.05ML SOLN, Gets from ophthalmologist, Disp: 1 vial, Rfl: 00    latanoprost (XALATAN) 0.005 % ophthalmic solution, Place 1 drop into both eyes. , Disp: , Rfl:     methotrexate 25 MG/ML injection, Inject 12.5 mg into the muscle once.  , Disp: , Rfl:     nortriptyline (PAMELOR) 25 MG capsule, Take 25 mg by mouth nightly.  , Disp: , Rfl:     insulin aspart (NOVOLOG FLEXPEN) 100 UNIT/ML injection pen, [id number]  05/19, Disp: 2 pen, Rfl: 0      Review of Systems   Constitutional: Positive for fatigue. Eyes: Positive for visual disturbance. Respiratory: Positive for shortness of breath. Neurological: Positive for dizziness and speech difficulty. All other systems reviewed and are negative. Vitals:    02/12/18 0913   BP: 116/82   Site: Left Arm   Position: Sitting   Cuff Size: Medium Adult   Pulse: 84   Weight: 178 lb (80.7 kg)   Height: 5' 9\" (1.753 m)       Objective:   Physical Exam   Constitutional: He appears well-developed and well-nourished. HENT:   Head: Normocephalic and atraumatic. Eyes: Conjunctivae are normal.   Neck: Neck supple. Cardiovascular: Normal rate. Pulmonary/Chest: Effort normal.   Musculoskeletal: Normal range of motion. Neurological: He is alert. Skin: Skin is warm and dry. Psychiatric: He has a normal mood and affect.      Lab Results   Component Value Date     09/27/2017    K 4.6 09/27/2017    CL 98 09/27/2017    CO2 24 09/27/2017    BUN 12 09/27/2017    CREATININE 0.46 (L) 09/27/2017    GLUCOSE 174 02/12/2018    CALCIUM 9.3 09/27/2017    PROT 7.3 09/27/2017    LABALBU 4.6 09/27/2017    BILITOT 0.3 simvastatin (ZOCOR) 20 MG tablet     Sig: TAKE ONE (1) TABLET BY MOUTH EVERY EVENING     Dispense:  90 tablet     Refill:  06    lisinopril (PRINIVIL;ZESTRIL) 10 MG tablet     Sig: TAKE ONE TABLET BY MOUTH EVERY DAY     Dispense:  90 tablet     Refill:  03    metFORMIN (GLUCOPHAGE) 500 MG tablet     Sig: Take 1 tablet by mouth 3 times daily     Dispense:  270 tablet     Refill:  03     Diabetes education provided today:    Diabetic retinopathy: the most frequent cause of blindness in [place] currently. Measures to prevent that. Managing high and low sugar readings.

## 2018-02-19 ENCOUNTER — TELEPHONE (OUTPATIENT)
Dept: PULMONOLOGY | Age: 43
End: 2018-02-19

## 2018-02-20 ENCOUNTER — ANTI-COAG VISIT (OUTPATIENT)
Dept: PRIMARY CARE CLINIC | Age: 43
End: 2018-02-20

## 2018-02-20 LAB
INR BLD: 3.7
PROTHROMBIN TIME: 39.5 SEC (ref 8.1–13.7)

## 2018-02-26 ENCOUNTER — OFFICE VISIT (OUTPATIENT)
Dept: PULMONOLOGY | Age: 43
End: 2018-02-26
Payer: COMMERCIAL

## 2018-02-26 VITALS
SYSTOLIC BLOOD PRESSURE: 122 MMHG | OXYGEN SATURATION: 96 % | WEIGHT: 180.6 LBS | BODY MASS INDEX: 26.75 KG/M2 | HEIGHT: 69 IN | DIASTOLIC BLOOD PRESSURE: 80 MMHG | HEART RATE: 84 BPM | TEMPERATURE: 97.8 F

## 2018-02-26 DIAGNOSIS — G47.30 SLEEP APNEA, UNSPECIFIED TYPE: Primary | ICD-10-CM

## 2018-02-26 DIAGNOSIS — K21.9 GASTROESOPHAGEAL REFLUX DISEASE WITHOUT ESOPHAGITIS: ICD-10-CM

## 2018-02-26 DIAGNOSIS — R06.02 SOB (SHORTNESS OF BREATH): ICD-10-CM

## 2018-02-26 PROCEDURE — G8484 FLU IMMUNIZE NO ADMIN: HCPCS | Performed by: INTERNAL MEDICINE

## 2018-02-26 PROCEDURE — 99214 OFFICE O/P EST MOD 30 MIN: CPT | Performed by: INTERNAL MEDICINE

## 2018-02-26 PROCEDURE — G8427 DOCREV CUR MEDS BY ELIG CLIN: HCPCS | Performed by: INTERNAL MEDICINE

## 2018-02-26 PROCEDURE — 1036F TOBACCO NON-USER: CPT | Performed by: INTERNAL MEDICINE

## 2018-02-26 PROCEDURE — G8419 CALC BMI OUT NRM PARAM NOF/U: HCPCS | Performed by: INTERNAL MEDICINE

## 2018-02-26 ASSESSMENT — ENCOUNTER SYMPTOMS
DIARRHEA: 0
VOICE CHANGE: 0
WHEEZING: 0
NAUSEA: 0
ABDOMINAL PAIN: 0
VOMITING: 0
EYE ITCHING: 0
RHINORRHEA: 0
SHORTNESS OF BREATH: 1
SORE THROAT: 0
COUGH: 0
CHEST TIGHTNESS: 0

## 2018-02-27 NOTE — PROGRESS NOTES
from ophthalmologist 1 vial 00    latanoprost (XALATAN) 0.005 % ophthalmic solution Place 1 drop into both eyes.  methotrexate 25 MG/ML injection Inject 12.5 mg into the muscle once.  nortriptyline (PAMELOR) 25 MG capsule Take 25 mg by mouth nightly.  simvastatin (ZOCOR) 20 MG tablet TAKE ONE (1) TABLET BY MOUTH EVERY EVENING 90 tablet 06     No current facility-administered medications for this visit. Results for orders placed in visit on 09/27/17   XR CHEST STANDARD (2 VW)    Narrative EXAMINATION: XR CHEST STANDARD TWO VW     CLINICAL HISTORY: R06.02 SOB (shortness of breath) ICD10    COMPARISONS: None     FINDINGS:    Two views of the chest are submitted. The cardiac silhouette is of normal size configuration. The mediastinum is unremarkable. Pulmonary vascular unremarkable. Right sided trachea. No focal infiltrates. No effusions. No Pneumothoraces. Impression NO ACUTE ACTIVE CARDIOPULMONARY PROCESS       Assessment/Plan:     1. Sleep apnea, unspecified type  Patient had overnight pulse oximetry done which shows cyclical O2 desaturation and sleep apnea was suspected. He underwent for sleep study February 7, 2080. Sleep study reviewed with patient shows no significant sleep apnea, AHI 0.6. No need for CPAP therapy at this time. 2. SOB (shortness of breath)  Recent is having chronic shortness of breath etiology unclear. Patient has a normal spirometry and lung volume study. CT chest was also shows no interstitial lung disease. Patient does frequent tachycardia and palpitations recommend cardiology evaluation. Chest x-ray for self-directed disease from her saturation 96%      Return in about 3 months (around 5/26/2018) for shortness of breath.       Moreno Cai MD

## 2018-03-02 DIAGNOSIS — I63.89 CEREBROVASCULAR ACCIDENT (CVA) DUE TO OTHER MECHANISM (HCC): ICD-10-CM

## 2018-03-02 RX ORDER — WARFARIN SODIUM 5 MG/1
10 TABLET ORAL DAILY
Qty: 180 TABLET | Refills: 1 | Status: SHIPPED | OUTPATIENT
Start: 2018-03-02 | End: 2019-12-06 | Stop reason: SDUPTHER

## 2018-04-11 LAB
INR BLD: 1.6
PROTHROMBIN TIME: 17.2 SEC (ref 9.6–12.3)

## 2018-04-12 ENCOUNTER — ANTI-COAG VISIT (OUTPATIENT)
Dept: PRIMARY CARE CLINIC | Age: 43
End: 2018-04-12

## 2018-04-26 ENCOUNTER — ANTI-COAG VISIT (OUTPATIENT)
Dept: PRIMARY CARE CLINIC | Age: 43
End: 2018-04-26

## 2018-04-26 LAB
INR BLD: 1.9
PROTHROMBIN TIME: 20.5 SEC (ref 9.6–12.3)

## 2018-06-04 ENCOUNTER — ANTI-COAG VISIT (OUTPATIENT)
Dept: PRIMARY CARE CLINIC | Age: 43
End: 2018-06-04

## 2018-06-04 LAB
INR BLD: 2.6
PROTHROMBIN TIME: 28 SEC (ref 9.6–12.3)

## 2018-07-10 DIAGNOSIS — Z79.01 ENCOUNTER FOR MONITORING COUMADIN THERAPY: Primary | ICD-10-CM

## 2018-07-10 DIAGNOSIS — Z79.01 ENCOUNTER FOR MONITORING COUMADIN THERAPY: ICD-10-CM

## 2018-07-10 DIAGNOSIS — Z51.81 ENCOUNTER FOR MONITORING COUMADIN THERAPY: Primary | ICD-10-CM

## 2018-07-10 DIAGNOSIS — Z51.81 ENCOUNTER FOR MONITORING COUMADIN THERAPY: ICD-10-CM

## 2018-07-10 LAB
INR BLD: 2.4
PROTHROMBIN TIME: 25 SEC (ref 9.6–12.3)

## 2018-08-06 DIAGNOSIS — E78.00 HYPERCHOLESTEREMIA: ICD-10-CM

## 2018-08-06 LAB
ALBUMIN SERPL-MCNC: 3.9 G/DL (ref 3.9–4.9)
ALP BLD-CCNC: 32 U/L (ref 35–104)
ALT SERPL-CCNC: 13 U/L (ref 0–41)
ANION GAP SERPL CALCULATED.3IONS-SCNC: 13 MEQ/L (ref 7–13)
AST SERPL-CCNC: 14 U/L (ref 0–40)
BILIRUB SERPL-MCNC: <0.2 MG/DL (ref 0–1.2)
BILIRUBIN DIRECT: <0.2 MG/DL (ref 0–0.3)
BILIRUBIN, INDIRECT: ABNORMAL MG/DL (ref 0–0.6)
BUN BLDV-MCNC: 10 MG/DL (ref 6–20)
CALCIUM SERPL-MCNC: 8.9 MG/DL (ref 8.6–10.2)
CHLORIDE BLD-SCNC: 102 MEQ/L (ref 98–107)
CHOLESTEROL, TOTAL: 142 MG/DL (ref 0–199)
CO2: 27 MEQ/L (ref 22–29)
CREAT SERPL-MCNC: 0.51 MG/DL (ref 0.7–1.2)
CREATININE URINE: 79.8 MG/DL
GFR AFRICAN AMERICAN: >60
GFR NON-AFRICAN AMERICAN: >60
GLUCOSE BLD-MCNC: 131 MG/DL (ref 74–109)
HBA1C MFR BLD: 6.9 % (ref 4.8–5.9)
HDLC SERPL-MCNC: 47 MG/DL (ref 40–59)
INR BLD: 2.8
LDL CHOLESTEROL CALCULATED: 61 MG/DL (ref 0–129)
MICROALBUMIN UR-MCNC: <1.2 MG/DL
MICROALBUMIN/CREAT UR-RTO: NORMAL MG/G (ref 0–30)
POTASSIUM SERPL-SCNC: 4.1 MEQ/L (ref 3.5–5.1)
PROTHROMBIN TIME: 29.9 SEC (ref 9.6–12.3)
SODIUM BLD-SCNC: 142 MEQ/L (ref 132–144)
TOTAL PROTEIN: 6.4 G/DL (ref 6.4–8.1)
TRIGL SERPL-MCNC: 172 MG/DL (ref 0–200)

## 2018-08-13 ENCOUNTER — OFFICE VISIT (OUTPATIENT)
Dept: ENDOCRINOLOGY | Age: 43
End: 2018-08-13
Payer: COMMERCIAL

## 2018-08-13 VITALS
DIASTOLIC BLOOD PRESSURE: 88 MMHG | WEIGHT: 160 LBS | HEIGHT: 69 IN | BODY MASS INDEX: 23.7 KG/M2 | HEART RATE: 63 BPM | SYSTOLIC BLOOD PRESSURE: 148 MMHG

## 2018-08-13 DIAGNOSIS — E10.319 DIABETIC RETINOPATHY OF BOTH EYES ASSOCIATED WITH TYPE 1 DIABETES MELLITUS, MACULAR EDEMA PRESENCE UNSPECIFIED, UNSPECIFIED RETINOPATHY SEVERITY (HCC): ICD-10-CM

## 2018-08-13 DIAGNOSIS — E10.9 TYPE 1 DIABETES MELLITUS WITHOUT COMPLICATION (HCC): Primary | ICD-10-CM

## 2018-08-13 LAB — GLUCOSE BLD-MCNC: 130 MG/DL

## 2018-08-13 PROCEDURE — G8427 DOCREV CUR MEDS BY ELIG CLIN: HCPCS | Performed by: INTERNAL MEDICINE

## 2018-08-13 PROCEDURE — G8598 ASA/ANTIPLAT THER USED: HCPCS | Performed by: INTERNAL MEDICINE

## 2018-08-13 PROCEDURE — G8420 CALC BMI NORM PARAMETERS: HCPCS | Performed by: INTERNAL MEDICINE

## 2018-08-13 PROCEDURE — 1036F TOBACCO NON-USER: CPT | Performed by: INTERNAL MEDICINE

## 2018-08-13 PROCEDURE — 99213 OFFICE O/P EST LOW 20 MIN: CPT | Performed by: INTERNAL MEDICINE

## 2018-08-13 PROCEDURE — 82962 GLUCOSE BLOOD TEST: CPT | Performed by: INTERNAL MEDICINE

## 2018-08-13 PROCEDURE — 3044F HG A1C LEVEL LT 7.0%: CPT | Performed by: INTERNAL MEDICINE

## 2018-08-13 PROCEDURE — 2022F DILAT RTA XM EVC RTNOPTHY: CPT | Performed by: INTERNAL MEDICINE

## 2018-08-13 ASSESSMENT — ENCOUNTER SYMPTOMS: VISUAL CHANGE: 1

## 2018-08-13 NOTE — PROGRESS NOTES
Subjective:      Patient ID: Irwin Nunez is a 39 y.o. male. Diabetes   He presents for his follow-up diabetic visit. He has type 1 diabetes mellitus. His disease course has been stable. Hypoglycemia symptoms include dizziness and speech difficulty. Associated symptoms include fatigue, foot paresthesias and visual change. Hypoglycemia complications include required assistance. Diabetic complications include nephropathy, peripheral neuropathy and retinopathy. Risk factors for coronary artery disease include diabetes mellitus, dyslipidemia, hypertension and male sex. Current diabetic treatment includes insulin pump (pump plus metformin ). He is compliant with treatment all of the time. He is currently taking insulin pre-breakfast, pre-lunch, pre-dinner and at bedtime. Meal planning includes carbohydrate counting. He monitors blood glucose at home 3-4 x per day. Blood glucose monitoring compliance is adequate. His overall blood glucose range is 140-180 mg/dl. (  Pt c/o neuropathy foot pain seen neurologist   Lab Results       Component                Value               Date                       LABA1C                   6.9 (H)             08/06/2018             reviewed pump downloads           ) An ACE inhibitor/angiotensin II receptor blocker is being taken. Eye exam is current. Hyperlipidemia   This is a chronic problem. The problem is controlled. Current antihyperlipidemic treatment includes statins. Risk factors for coronary artery disease include diabetes mellitus, dyslipidemia and male sex. 6 months follow-up  Reviewed labs with patient  Results for Berry Oakley (MRN [id number]) as of 8/13/2018 10:31   Ref.  Range 8/6/2018 08:32 8/6/2018 08:33   Sodium Latest Ref Range: 132 - 144 mEq/L  142   Potassium Latest Ref Range: 3.5 - 5.1 mEq/L  4.1   Chloride Latest Ref Range: 98 - 107 mEq/L  102   CO2 Latest Ref Range: 22 - 29 mEq/L  27   BUN Latest Ref Range: 6 - 20 mg/dL  10   Creatinine Latest

## 2018-08-14 ENCOUNTER — TELEPHONE (OUTPATIENT)
Dept: PRIMARY CARE CLINIC | Age: 43
End: 2018-08-14

## 2018-09-18 LAB
INR BLD: 3
PROTIME: 32.1 SECONDS

## 2018-09-24 ENCOUNTER — ANTI-COAG VISIT (OUTPATIENT)
Dept: FAMILY MEDICINE CLINIC | Age: 43
End: 2018-09-24

## 2018-09-28 DIAGNOSIS — I63.89 CEREBROVASCULAR ACCIDENT (CVA) DUE TO OTHER MECHANISM (HCC): ICD-10-CM

## 2018-09-29 RX ORDER — WARFARIN SODIUM 5 MG/1
TABLET ORAL
Qty: 180 TABLET | Refills: 2 | OUTPATIENT
Start: 2018-09-29

## 2018-10-01 RX ORDER — INSULIN DETEMIR 100 [IU]/ML
INJECTION, SOLUTION SUBCUTANEOUS
Qty: 10 ML | Refills: 1 | Status: SHIPPED | OUTPATIENT
Start: 2018-10-01 | End: 2019-04-25 | Stop reason: SDUPTHER

## 2018-10-17 LAB
INR BLD: 3.2
PROTHROMBIN TIME: 33.6 SEC (ref 9.6–12.3)

## 2018-10-30 ENCOUNTER — ANTI-COAG VISIT (OUTPATIENT)
Dept: PRIMARY CARE CLINIC | Age: 43
End: 2018-10-30

## 2018-10-30 ENCOUNTER — TELEPHONE (OUTPATIENT)
Dept: PRIMARY CARE CLINIC | Age: 43
End: 2018-10-30

## 2018-10-30 NOTE — TELEPHONE ENCOUNTER
Pt called for inr results. States he has called 3 times. Per Dr. Taniya Jaimeter pt is to take 2.5mg mon wed fri and 5mg all other days. Recheck in a month. Pt aware.

## 2018-11-28 LAB
INR BLD: 1.5
PROTHROMBIN TIME: 15.5 SEC (ref 9.6–12.3)

## 2018-12-05 ENCOUNTER — TELEPHONE (OUTPATIENT)
Dept: PRIMARY CARE CLINIC | Age: 43
End: 2018-12-05

## 2019-01-19 LAB
INR BLD: 2.5
PROTHROMBIN TIME: 24.1 SEC (ref 9–11.5)

## 2019-01-25 ENCOUNTER — TELEPHONE (OUTPATIENT)
Dept: PRIMARY CARE CLINIC | Age: 44
End: 2019-01-25

## 2019-02-20 LAB
INR BLD: 2.8
PROTHROMBIN TIME: 27.8 SEC (ref 9–11.5)

## 2019-03-01 ENCOUNTER — TELEPHONE (OUTPATIENT)
Dept: PRIMARY CARE CLINIC | Age: 44
End: 2019-03-01

## 2019-03-26 DIAGNOSIS — E10.9 TYPE 1 DIABETES MELLITUS WITHOUT COMPLICATION (HCC): ICD-10-CM

## 2019-03-26 LAB
ANION GAP SERPL CALCULATED.3IONS-SCNC: 13 MEQ/L (ref 9–15)
BUN BLDV-MCNC: 11 MG/DL (ref 6–20)
CALCIUM SERPL-MCNC: 9.1 MG/DL (ref 8.5–9.9)
CHLORIDE BLD-SCNC: 99 MEQ/L (ref 95–107)
CO2: 28 MEQ/L (ref 20–31)
CREAT SERPL-MCNC: 0.51 MG/DL (ref 0.7–1.2)
GFR AFRICAN AMERICAN: >60
GFR NON-AFRICAN AMERICAN: >60
GLUCOSE BLD-MCNC: 303 MG/DL (ref 70–99)
HBA1C MFR BLD: 9.1 % (ref 4.8–5.9)
INR BLD: 3.1
POTASSIUM SERPL-SCNC: 4.2 MEQ/L (ref 3.4–4.9)
PROTHROMBIN TIME: 30 SEC (ref 9–11.5)
SODIUM BLD-SCNC: 140 MEQ/L (ref 135–144)

## 2019-03-29 ENCOUNTER — TELEPHONE (OUTPATIENT)
Dept: PRIMARY CARE CLINIC | Age: 44
End: 2019-03-29

## 2019-04-10 ENCOUNTER — OFFICE VISIT (OUTPATIENT)
Dept: ENDOCRINOLOGY | Age: 44
End: 2019-04-10
Payer: MEDICARE

## 2019-04-10 VITALS
SYSTOLIC BLOOD PRESSURE: 145 MMHG | WEIGHT: 173 LBS | HEART RATE: 90 BPM | BODY MASS INDEX: 25.55 KG/M2 | DIASTOLIC BLOOD PRESSURE: 100 MMHG

## 2019-04-10 LAB
CHP ED QC CHECK: NORMAL
GLUCOSE BLD-MCNC: 102 MG/DL

## 2019-04-10 PROCEDURE — 99213 OFFICE O/P EST LOW 20 MIN: CPT | Performed by: INTERNAL MEDICINE

## 2019-04-10 PROCEDURE — 3046F HEMOGLOBIN A1C LEVEL >9.0%: CPT | Performed by: INTERNAL MEDICINE

## 2019-04-10 PROCEDURE — 2022F DILAT RTA XM EVC RTNOPTHY: CPT | Performed by: INTERNAL MEDICINE

## 2019-04-10 PROCEDURE — G8419 CALC BMI OUT NRM PARAM NOF/U: HCPCS | Performed by: INTERNAL MEDICINE

## 2019-04-10 PROCEDURE — 1036F TOBACCO NON-USER: CPT | Performed by: INTERNAL MEDICINE

## 2019-04-10 PROCEDURE — G8427 DOCREV CUR MEDS BY ELIG CLIN: HCPCS | Performed by: INTERNAL MEDICINE

## 2019-04-10 PROCEDURE — 82962 GLUCOSE BLOOD TEST: CPT | Performed by: INTERNAL MEDICINE

## 2019-04-10 ASSESSMENT — ENCOUNTER SYMPTOMS: VISUAL CHANGE: 1

## 2019-04-10 NOTE — PROGRESS NOTES
Subjective:      Patient ID: Noah Bradshaw is a 39 y.o. male. 8 month f/u on diabetes    Diabetes    He presents for his follow-up diabetic visit. He has type 1 diabetes mellitus. Associated symptoms include visual change. Hypoglycemia complications include required assistance. Diabetic complications include nephropathy, peripheral neuropathy and retinopathy. Current diabetic treatment includes insulin pump (humalog  plus metformin ). Meal planning includes carbohydrate counting. Frequency home blood tests: 4 times day   (Lab Results       Component                Value               Date                       LABA1C                   9.1 (H)             03/26/2019                ) An ACE inhibitor/angiotensin II receptor blocker is being taken. Eye exam is current. Results for Mell Postin (MRN [id number]) as of 4/20/2019 20:15   Ref.  Range 3/26/2019 10:44   Sodium Latest Ref Range: 135 - 144 mEq/L 140   Potassium Latest Ref Range: 3.4 - 4.9 mEq/L 4.2   Chloride Latest Ref Range: 95 - 107 mEq/L 99   CO2 Latest Ref Range: 20 - 31 mEq/L 28   BUN Latest Ref Range: 6 - 20 mg/dL 11   Creatinine Latest Ref Range: 0.70 - 1.20 mg/dL 0.51 (L)   Anion Gap Latest Ref Range: 9 - 15 mEq/L 13   GFR Non- Latest Ref Range: >60  >60.0   GFR African American Latest Ref Range: >60  >60.0   Glucose Latest Ref Range: 70 - 99 mg/dL 303 (H)   Calcium Latest Ref Range: 8.5 - 9.9 mg/dL 9.1   Hemoglobin A1C Latest Ref Range: 4.8 - 5.9 % 9.1 (H)       Patient Active Problem List   Diagnosis    Type 1 diabetes mellitus without complication (HCC)    Hypercholesteremia    Diabetic retinopathy (HCC)    Chest pain of uncertain etiology    Dizziness    SOB (shortness of breath)     No Known Allergies    Current Outpatient Medications:     insulin lispro (HUMALOG) 100 UNIT/ML injection vial, Use max dose 150 units/day, Disp: 5 vial, Rfl: 3    HUMALOG 100 UNIT/ML injection vial, use up to 150 units per day as directed via insulin pump, Disp: 50 mL, Rfl: 1    LEVEMIR 100 UNIT/ML injection vial, INJECT 25 UNITS NIGHTLY, Disp: 10 mL, Rfl: 1    warfarin (COUMADIN) 5 MG tablet, Take 2 tablets by mouth daily Take as directed, Disp: 180 tablet, Rfl: 1    simvastatin (ZOCOR) 20 MG tablet, TAKE ONE (1) TABLET BY MOUTH EVERY EVENING, Disp: 90 tablet, Rfl: 06    lisinopril (PRINIVIL;ZESTRIL) 10 MG tablet, TAKE ONE TABLET BY MOUTH EVERY DAY, Disp: 90 tablet, Rfl: 03    metFORMIN (GLUCOPHAGE) 500 MG tablet, Take 1 tablet by mouth 3 times daily, Disp: 270 tablet, Rfl: 03    vitamin D (ERGOCALCIFEROL) 89787 units CAPS capsule, TAKE 1 CAPSULE BY MOUTH ONCE A WEEK, Disp: , Rfl: 3    HUMIRA PEN 40 MG/0.8ML injection, , Disp: , Rfl:     HYDROcodone-acetaminophen (NORCO) 5-325 MG per tablet, TAKE ONE TABLET BY MOUTH EVERY DAY, Disp: , Rfl: 0    timolol (TIMOPTIC) 0.5 % ophthalmic solution, INSTILL ONE DROP INTO EACH EYE TWICE DAILY, Disp: , Rfl: 1    prednisoLONE acetate (PRED FORTE) 1 % ophthalmic suspension, INSTILL 1 DROP INTO BOTH EYES 3 TIMES A DAY AS DIRECTED. USE PRE AND POST INTRAVITREAL INJECTIONS., Disp: , Rfl: 6    Insulin Pump Accessories MISC, by Does not apply route, Disp: , Rfl:     folic acid (FOLVITE) 1 MG tablet, Take 1 tablet by mouth daily, Disp: 90 tablet, Rfl: 1    fluticasone (FLONASE) 50 MCG/ACT nasal spray, 1 spray by Nasal route daily, Disp: 1 Bottle, Rfl: 5    gabapentin (NEURONTIN) 300 MG capsule, Take 1 capsule by mouth 3 times daily, Disp: 90 capsule, Rfl: 06    traMADol (ULTRAM) 50 MG tablet, take one or two tabs three times a day with Tylenol, Disp: , Rfl:     ALPHAGAN P 0.15 % ophthalmic solution, Place 1 drop into both eyes daily. , Disp: , Rfl:     Ranibizumab 0.3 MG/0.05ML SOLN, Gets from ophthalmologist, Disp: 1 vial, Rfl: 00    latanoprost (XALATAN) 0.005 % ophthalmic solution, Place 1 drop into both eyes. , Disp: , Rfl:     methotrexate 25 MG/ML injection, Inject 12.5 mg into the

## 2019-04-24 LAB
INR BLD: 2.9
PROTHROMBIN TIME: 28.1 SEC (ref 9–11.5)

## 2019-04-25 ENCOUNTER — ANTI-COAG VISIT (OUTPATIENT)
Dept: FAMILY MEDICINE CLINIC | Age: 44
End: 2019-04-25

## 2019-04-26 RX ORDER — INSULIN DETEMIR 100 [IU]/ML
INJECTION, SOLUTION SUBCUTANEOUS
Qty: 10 ML | Refills: 3 | Status: SHIPPED | OUTPATIENT
Start: 2019-04-26 | End: 2019-12-27 | Stop reason: SDUPTHER

## 2019-06-12 ENCOUNTER — TELEPHONE (OUTPATIENT)
Dept: FAMILY MEDICINE CLINIC | Age: 44
End: 2019-06-12

## 2019-06-12 DIAGNOSIS — I63.9 CEREBROVASCULAR ACCIDENT (CVA), UNSPECIFIED MECHANISM (HCC): ICD-10-CM

## 2019-06-12 DIAGNOSIS — I82.4Y9 DEEP VEIN THROMBOSIS (DVT) OF PROXIMAL LOWER EXTREMITY, UNSPECIFIED CHRONICITY, UNSPECIFIED LATERALITY (HCC): Primary | ICD-10-CM

## 2019-06-12 DIAGNOSIS — I82.4Y9 DEEP VEIN THROMBOSIS (DVT) OF PROXIMAL LOWER EXTREMITY, UNSPECIFIED CHRONICITY, UNSPECIFIED LATERALITY (HCC): ICD-10-CM

## 2019-06-12 LAB
INR BLD: 2.9
PROTHROMBIN TIME: 28.4 SEC (ref 9–11.5)

## 2019-07-10 ENCOUNTER — TELEPHONE (OUTPATIENT)
Dept: FAMILY MEDICINE CLINIC | Age: 44
End: 2019-07-10

## 2019-07-11 LAB
INR BLD: 2.3
PROTHROMBIN TIME: 25.9 SEC (ref 12.3–14.9)

## 2019-07-19 ENCOUNTER — TELEPHONE (OUTPATIENT)
Dept: FAMILY MEDICINE CLINIC | Age: 44
End: 2019-07-19

## 2019-08-29 DIAGNOSIS — E78.00 HYPERCHOLESTEREMIA: ICD-10-CM

## 2019-08-29 RX ORDER — LISINOPRIL 10 MG/1
TABLET ORAL
Qty: 90 TABLET | Refills: 3 | Status: SHIPPED | OUTPATIENT
Start: 2019-08-29 | End: 2020-08-06 | Stop reason: SDUPTHER

## 2019-09-13 LAB
INR BLD: 2.4
PROTHROMBIN TIME: 27 SEC (ref 12.3–14.9)

## 2019-11-16 LAB
INR BLD: 2.2
PROTHROMBIN TIME: 25.6 SEC (ref 12.3–14.9)

## 2019-12-06 DIAGNOSIS — I63.89 CEREBROVASCULAR ACCIDENT (CVA) DUE TO OTHER MECHANISM (HCC): ICD-10-CM

## 2019-12-06 RX ORDER — WARFARIN SODIUM 5 MG/1
10 TABLET ORAL DAILY
Qty: 180 TABLET | Refills: 1 | Status: SHIPPED | OUTPATIENT
Start: 2019-12-06 | End: 2020-01-08 | Stop reason: SDUPTHER

## 2019-12-11 LAB
INR BLD: 2.3
PROTHROMBIN TIME: 25.9 SEC (ref 12.3–14.9)

## 2019-12-19 ENCOUNTER — TELEPHONE (OUTPATIENT)
Dept: PRIMARY CARE CLINIC | Age: 44
End: 2019-12-19

## 2019-12-30 RX ORDER — BRIMONIDINE TARTRATE 2 MG/ML
SOLUTION/ DROPS OPHTHALMIC
Refills: 6 | COMMUNITY
Start: 2019-10-20

## 2019-12-30 RX ORDER — GABAPENTIN 600 MG/1
TABLET ORAL
Refills: 3 | COMMUNITY
Start: 2019-11-17

## 2020-01-08 ENCOUNTER — OFFICE VISIT (OUTPATIENT)
Dept: FAMILY MEDICINE CLINIC | Age: 45
End: 2020-01-08
Payer: MEDICARE

## 2020-01-08 VITALS
RESPIRATION RATE: 14 BRPM | OXYGEN SATURATION: 96 % | SYSTOLIC BLOOD PRESSURE: 129 MMHG | HEART RATE: 92 BPM | WEIGHT: 183 LBS | DIASTOLIC BLOOD PRESSURE: 81 MMHG | HEIGHT: 69 IN | TEMPERATURE: 97.8 F | BODY MASS INDEX: 27.11 KG/M2

## 2020-01-08 PROCEDURE — 99396 PREV VISIT EST AGE 40-64: CPT | Performed by: INTERNAL MEDICINE

## 2020-01-08 RX ORDER — WARFARIN SODIUM 5 MG/1
10 TABLET ORAL DAILY
Qty: 180 TABLET | Refills: 3 | Status: SHIPPED | OUTPATIENT
Start: 2020-01-08 | End: 2021-12-13 | Stop reason: SDUPTHER

## 2020-01-08 RX ORDER — FLUTICASONE PROPIONATE 50 MCG
1 SPRAY, SUSPENSION (ML) NASAL DAILY
Qty: 1 BOTTLE | Refills: 5 | Status: CANCELLED | OUTPATIENT
Start: 2020-01-08

## 2020-01-08 SDOH — ECONOMIC STABILITY: FOOD INSECURITY: WITHIN THE PAST 12 MONTHS, YOU WORRIED THAT YOUR FOOD WOULD RUN OUT BEFORE YOU GOT MONEY TO BUY MORE.: NEVER TRUE

## 2020-01-08 SDOH — ECONOMIC STABILITY: INCOME INSECURITY: HOW HARD IS IT FOR YOU TO PAY FOR THE VERY BASICS LIKE FOOD, HOUSING, MEDICAL CARE, AND HEATING?: NOT HARD AT ALL

## 2020-01-08 SDOH — ECONOMIC STABILITY: FOOD INSECURITY: WITHIN THE PAST 12 MONTHS, THE FOOD YOU BOUGHT JUST DIDN'T LAST AND YOU DIDN'T HAVE MONEY TO GET MORE.: NEVER TRUE

## 2020-01-08 ASSESSMENT — PATIENT HEALTH QUESTIONNAIRE - PHQ9
SUM OF ALL RESPONSES TO PHQ QUESTIONS 1-9: 0
SUM OF ALL RESPONSES TO PHQ QUESTIONS 1-9: 0
1. LITTLE INTEREST OR PLEASURE IN DOING THINGS: 0
SUM OF ALL RESPONSES TO PHQ9 QUESTIONS 1 & 2: 0
2. FEELING DOWN, DEPRESSED OR HOPELESS: 0

## 2020-01-08 NOTE — PROGRESS NOTES
Remberto Linares Alejandro 38 y.o. male presents today with   Chief Complaint   Patient presents with   [facility] Check-Up     Patient here for general check up. Refill on coumadin 5mg for blood clots. HPI check tup  Hx cva    Past Medical History:   Diagnosis Date    Chest pain of uncertain etiology 7350    Dizziness 2017    DVT of leg (deep venous thrombosis) (HCC)     x2    Hyperlipidemia     Hypertension     Rheumatoid arthritis ([facility]     dr Landon Sandifer ht.  Smoker     quit     SOB (shortness of breath) 2017    Stroke ([address]     bleed    Type 1 diabetes mellitus, uncontrolled ([facility]      Patient Active Problem List    Diagnosis Date Noted    Dizziness 2017    SOB (shortness of breath) 2017    Chest pain of uncertain etiology     Hypercholesteremia 10/10/2012    Diabetic retinopathy ([facility] 10/10/2012    Type 1 diabetes mellitus without complication ([facility]      No past surgical history on file.   Family History   Problem Relation Age of Onset    Clotting Disorder Mother         blood clots     Social History     Socioeconomic History    Marital status:      Spouse name: None    Number of children: None    Years of education: None    Highest education level: None   Occupational History    None   Social Needs    Financial resource strain: Not hard at all   Homeforswap insecurity:     Worry: Never true     Inability: Never true   [employer] needs:     Medical: None     Non-medical: None   Tobacco Use    Smoking status: Former Smoker     Last attempt to quit:      Years since quittin.0    Smokeless tobacco: Never Used   Substance and Sexual Activity    Alcohol use: No    Drug use: No    Sexual activity: None   Lifestyle    Physical activity:     Days per week: None     Minutes per session: None    Stress: None   Relationships    Social connections:     Talks on phone: None     Gets together: None     Attends [employer] service: Mental Status: He is alert. Psychiatric:         Mood and Affect: Mood normal.     Assessment/Plan  Mercedez Block was seen today for check-up. Diagnoses and all orders for this visit:    Preventative health care    Cerebrovascular accident (CVA) due to other mechanism ([facility]  -     warfarin (COUMADIN) 5 MG tablet; Take 2 tablets by mouth daily Take as directed  -     POCT INR; Standing        No follow-ups on file.     Lady Feliz MD

## 2020-01-11 DIAGNOSIS — I63.89 CEREBROVASCULAR ACCIDENT (CVA) DUE TO OTHER MECHANISM (HCC): ICD-10-CM

## 2020-01-11 LAB
INR BLD: 2
PROTHROMBIN TIME: 23.6 SEC (ref 12.3–14.9)

## 2020-01-13 ASSESSMENT — ENCOUNTER SYMPTOMS
VOMITING: 0
NAUSEA: 0
CHOKING: 0
TROUBLE SWALLOWING: 0
PHOTOPHOBIA: 0
SHORTNESS OF BREATH: 0
VOICE CHANGE: 0

## 2020-01-29 ENCOUNTER — OFFICE VISIT (OUTPATIENT)
Dept: ENDOCRINOLOGY | Age: 45
End: 2020-01-29
Payer: MEDICARE

## 2020-01-29 VITALS
HEIGHT: 69 IN | RESPIRATION RATE: 16 BRPM | OXYGEN SATURATION: 95 % | HEART RATE: 101 BPM | SYSTOLIC BLOOD PRESSURE: 131 MMHG | DIASTOLIC BLOOD PRESSURE: 93 MMHG | WEIGHT: 183.6 LBS | BODY MASS INDEX: 27.19 KG/M2

## 2020-01-29 LAB
CHP ED QC CHECK: NORMAL
GLUCOSE BLD-MCNC: 139 MG/DL
HBA1C MFR BLD: 8.2 %

## 2020-01-29 PROCEDURE — 82962 GLUCOSE BLOOD TEST: CPT | Performed by: INTERNAL MEDICINE

## 2020-01-29 PROCEDURE — 83036 HEMOGLOBIN GLYCOSYLATED A1C: CPT | Performed by: INTERNAL MEDICINE

## 2020-01-29 PROCEDURE — 99213 OFFICE O/P EST LOW 20 MIN: CPT | Performed by: INTERNAL MEDICINE

## 2020-01-30 NOTE — PROGRESS NOTES
Subjective:      Patient ID: Ben Kim is a 38 y.o. male. 8 to 9 months follow-up on type 1 diabetes patient follows up with ophthalmologist for diabetic retinopathy aiC was 8.2 today  No recent labs to review  Diabetes   He presents for his follow-up diabetic visit. He has type 1 diabetes mellitus. Diabetic complications include peripheral neuropathy and retinopathy. Current diabetic treatment includes insulin pump. Meal planning includes carbohydrate counting.  (Lab Results       Component                Value               Date                       LABA1C                   8.2                 01/29/2020            Reviewed pump setting no change basal rate 2 units/h insulin to carb ratio was 10 sensitivity was 50 goal was 100-1 20  )     Patient Active Problem List   Diagnosis    Type 1 diabetes mellitus without complication (HCC)    Hypercholesteremia    Diabetic retinopathy (HCC)    Chest pain of uncertain etiology    Dizziness    SOB (shortness of breath)     No Known Allergies      Current Outpatient Medications:     insulin lispro (HUMALOG) 100 UNIT/ML injection vial, Use via pump max dose 150 units/day updated rx, Disp: 5 vial, Rfl: 3    warfarin (COUMADIN) 5 MG tablet, Take 2 tablets by mouth daily Take as directed, Disp: 180 tablet, Rfl: 3    brimonidine (ALPHAGAN) 0.2 % ophthalmic solution, INSTILL ONE DROP INTO EACH EYE TWICE DAILY, Disp: , Rfl: 6    insulin lispro (HUMALOG) 100 UNIT/ML injection vial, USE AS DIRECTED MAX DOSE  UNITS PER DAY, Disp: 50 mL, Rfl: 2    insulin detemir (LEVEMIR) 100 UNIT/ML injection vial, INJECT 25 UNITS SUBCUTANEOUSLY NIGHTLY, Disp: 10 mL, Rfl: 2    gabapentin (NEURONTIN) 600 MG tablet, TAKE ONE TABLET BY MOUTH FOUR TIMES A DAY AS NEEDED, Disp: , Rfl: 3    lisinopril (PRINIVIL;ZESTRIL) 10 MG tablet, TAKE ONE TABLET BY MOUTH EVERY DAY, Disp: 90 tablet, Rfl: 03    simvastatin (ZOCOR) 20 MG tablet, TAKE ONE (1) TABLET BY MOUTH EVERY EVENING, Disp:

## 2020-02-27 DIAGNOSIS — I63.89 CEREBROVASCULAR ACCIDENT (CVA) DUE TO OTHER MECHANISM (HCC): ICD-10-CM

## 2020-02-27 LAB
INR BLD: 1.9
PROTHROMBIN TIME: 22.2 SEC (ref 12.3–14.9)

## 2020-03-05 ENCOUNTER — TELEPHONE (OUTPATIENT)
Dept: PRIMARY CARE CLINIC | Age: 45
End: 2020-03-05

## 2020-04-10 ENCOUNTER — TELEPHONE (OUTPATIENT)
Dept: PRIMARY CARE CLINIC | Age: 45
End: 2020-04-10

## 2020-04-23 ENCOUNTER — ANTI-COAG VISIT (OUTPATIENT)
Dept: PRIMARY CARE CLINIC | Age: 45
End: 2020-04-23

## 2020-04-29 NOTE — TELEPHONE ENCOUNTER
Called pt, he is needing a return to work physical. Scheduled next available clinical day in  May and he verbally understood.    I SPOKE WITH PATIENT DUE TO HAVING HEALTH MAINTENANCE DUE. PATIENT JUST WANTED TO LET YOU KNOW THAT HE DOES NOT WANT TO GO GET HIS A1C DONE AT [facility] RIGHT NOW. HE STATES AS SOON AS THINGS ARE SAFER, HE WILL GET IT DONE.  CASANDRA

## 2020-06-01 ENCOUNTER — TELEPHONE (OUTPATIENT)
Dept: PRIMARY CARE CLINIC | Age: 45
End: 2020-06-01

## 2020-06-01 NOTE — TELEPHONE ENCOUNTER
Patient calling in, said you gave him a tube of some kind of bactrim for cuts because he is a diabetic and he threw the tube out.   Would like a new prescription called in   Uses [facility] in [facility]

## 2020-06-29 LAB
INR BLD: 2.1
PROTHROMBIN TIME: 23.7 SEC (ref 12.3–14.9)

## 2020-07-06 ENCOUNTER — TELEPHONE (OUTPATIENT)
Dept: PRIMARY CARE CLINIC | Age: 45
End: 2020-07-06

## 2020-07-15 RX ORDER — SIMVASTATIN 20 MG
TABLET ORAL
Qty: 90 TABLET | Refills: 1 | Status: SHIPPED | OUTPATIENT
Start: 2020-07-15 | End: 2022-08-08 | Stop reason: ALTCHOICE

## 2020-07-15 NOTE — TELEPHONE ENCOUNTER
Dr. Riddle Records -   - patient has not been taking statin (Hx DM and stroke)  - Educated patient to get lab work done that is ordered from Jan  - pt willing to go back on simvastatin 20 mg- new script needed and pended     Thank you,  Miroslava Salas, PharmD, [place] Pharmacist  Direct: [phone], Ext [phone]  ==============================================================  CLINICAL PHARMACY: STATIN REVIEW    SUBJECTIVE:   Identified as DM care gap for [place]: statin therapy.      OBJECTIVE:  No Known Allergies    Medications per current medication list:  Current Outpatient Medications   Medication Sig Dispense Refill    insulin lispro (HUMALOG) 100 UNIT/ML injection vial USE VIA PUMP  UNITS PER DAY please give 6 vials per 30 days 6 vial 3    warfarin (COUMADIN) 5 MG tablet Take 2 tablets by mouth daily Take as directed 180 tablet 3    brimonidine (ALPHAGAN) 0.2 % ophthalmic solution INSTILL ONE DROP INTO EACH EYE TWICE DAILY  6    insulin lispro (HUMALOG) 100 UNIT/ML injection vial USE AS DIRECTED MAX DOSE  UNITS PER DAY 50 mL 2    insulin detemir (LEVEMIR) 100 UNIT/ML injection vial INJECT 25 UNITS SUBCUTANEOUSLY NIGHTLY 10 mL 2    gabapentin (NEURONTIN) 600 MG tablet TAKE ONE TABLET BY MOUTH FOUR TIMES A DAY AS NEEDED  3    lisinopril (PRINIVIL;ZESTRIL) 10 MG tablet TAKE ONE TABLET BY MOUTH EVERY DAY 90 tablet 03    simvastatin (ZOCOR) 20 MG tablet TAKE ONE (1) TABLET BY MOUTH EVERY EVENING 90 tablet 06    metFORMIN (GLUCOPHAGE) 500 MG tablet Take 1 tablet by mouth 3 times daily 270 tablet 03    vitamin D (ERGOCALCIFEROL) 71635 units CAPS capsule TAKE 1 CAPSULE BY MOUTH ONCE A WEEK  3    HUMIRA PEN 40 MG/0.8ML injection       HYDROcodone-acetaminophen (NORCO) 5-325 MG per tablet TAKE ONE TABLET BY MOUTH EVERY DAY  0    timolol (TIMOPTIC) 0.5 % ophthalmic solution INSTILL ONE DROP INTO EACH EYE TWICE DAILY  1    prednisoLONE acetate (PRED FORTE) 1 % ophthalmic suspension INSTILL 1 DROP INTO BOTH EYES 3 TIMES A DAY AS DIRECTED. USE PRE AND POST INTRAVITREAL INJECTIONS.  6    Insulin Pump Accessories MISC by Does not apply route      folic acid (FOLVITE) 1 MG tablet Take 1 tablet by mouth daily 90 tablet 1    fluticasone (FLONASE) 50 MCG/ACT nasal spray 1 spray by Nasal route daily 1 Bottle 5    gabapentin (NEURONTIN) 300 MG capsule Take 1 capsule by mouth 3 times daily 90 capsule 06    traMADol (ULTRAM) 50 MG tablet take one or two tabs three times a day with Tylenol      ALPHAGAN P 0.15 % ophthalmic solution Place 1 drop into both eyes daily.  Ranibizumab 0.3 MG/0.05ML SOLN Gets from ophthalmologist 1 vial 00    latanoprost (XALATAN) 0.005 % ophthalmic solution Place 1 drop into both eyes.  methotrexate 25 MG/ML injection Inject 12.5 mg into the muscle once.  nortriptyline (PAMELOR) 25 MG capsule Take 25 mg by mouth nightly. No current facility-administered medications for this visit.       Labs:  Lab Results   Component Value Date    CHOL 142 08/06/2018    CHOL 184 11/14/2016    CHOL 170 11/27/2015     Lab Results   Component Value Date    TRIG 172 08/06/2018    TRIG 220 (H) 11/14/2016    TRIG 176 11/27/2015     Lab Results   Component Value Date    HDL 47 08/06/2018    HDL 49 11/14/2016    HDL 54 11/27/2015     Lab Results   Component Value Date    LDLCALC 61 08/06/2018    [address] 91 11/14/2016    LDLCALC 81 11/27/2015     No results found for: LABVLDL, VLDL  Lab Results   Component Value Date    CHOLHDLRATIO 3.3 01/07/2013    CHOLHDLRATIO 3.2 05/26/2012    CHOLHDLRATIO 3.7 11/19/2011     Lab Results   Component Value Date    ALT 13 08/06/2018       ASSESSMENT:    2019 ADA Guidelines Age:      o History of ASCVD or 10-year ASCVD risk > 20% - high-intensity statin is recommended.   o History of stroke and DM  o Simvastatin listed in med list- should be on high intensity  o Last labwork 2018- ldl 61    PLAN:  Discuss statin with patient, prescribed simvastatin and has Hx of stroke. Needs updated labs    Reached patient for review. Patient states he had a lot going on and was having insurance issues and cost issues with bills. He knows his cholesterol is controlled so this became low priority for him. Discussed DM and stroke history and statin- pt states his stroke was from a blot clot and that runs in his family not from his cholesterol. Patient does understand DM guidelines and is willing to go back on statin. He would prefer to stay on simvastatin 20 mg and not do high intensity.      Thank you,    [place], PharmD, [place] Pharmacist  Direct: [phone], Ext 7  ===================================  CLINICAL PHARMACY CONSULT: MED RECONCILIATION/REVIEW ADDENDUM    For Pharmacy Admin Tracking Only    PHSO: Yes  Total # of Interventions Recommended: 1  - New Order #: 1 New Medication Order Reason(s): Needs Additional Medication Therapy  Recommended intervention potential cost savings: 1  Total Interventions Accepted: 1  Time Spent (min): [address]

## 2020-07-20 ENCOUNTER — TELEPHONE (OUTPATIENT)
Dept: PRIMARY CARE CLINIC | Age: 45
End: 2020-07-20

## 2020-07-20 NOTE — TELEPHONE ENCOUNTER
Seeing [place] [facility] in [address] [facility], and with his insurance he needs a referral.  Seeing them for cataract. Please fax to [phone] when done. Dr Keyshawn Joyce is who he is seeing.

## 2020-07-25 DIAGNOSIS — I63.89 CEREBROVASCULAR ACCIDENT (CVA) DUE TO OTHER MECHANISM (HCC): ICD-10-CM

## 2020-07-25 LAB
ANION GAP SERPL CALCULATED.3IONS-SCNC: 16 MEQ/L (ref 9–15)
BUN BLDV-MCNC: 13 MG/DL (ref 6–20)
CALCIUM SERPL-MCNC: 8.9 MG/DL (ref 8.5–9.9)
CHLORIDE BLD-SCNC: 102 MEQ/L (ref 95–107)
CHOLESTEROL, TOTAL: 186 MG/DL (ref 0–199)
CO2: 23 MEQ/L (ref 20–31)
CREAT SERPL-MCNC: 0.57 MG/DL (ref 0.7–1.2)
CREATININE URINE: 123.6 MG/DL
GFR AFRICAN AMERICAN: >60
GFR NON-AFRICAN AMERICAN: >60
GLUCOSE BLD-MCNC: 159 MG/DL (ref 70–99)
HBA1C MFR BLD: 7.4 % (ref 4.8–5.9)
HDLC SERPL-MCNC: 44 MG/DL (ref 40–59)
INR BLD: 2
LDL CHOLESTEROL CALCULATED: 67 MG/DL (ref 0–129)
MICROALBUMIN UR-MCNC: 5.7 MG/DL
MICROALBUMIN/CREAT UR-RTO: 46.1 MG/G (ref 0–30)
POTASSIUM SERPL-SCNC: 3.9 MEQ/L (ref 3.4–4.9)
PROTHROMBIN TIME: 22.3 SEC (ref 12.3–14.9)
SODIUM BLD-SCNC: 141 MEQ/L (ref 135–144)
TRIGL SERPL-MCNC: 376 MG/DL (ref 0–150)

## 2020-07-27 ENCOUNTER — TELEPHONE (OUTPATIENT)
Dept: PRIMARY CARE CLINIC | Age: 45
End: 2020-07-27

## 2020-07-30 ENCOUNTER — TELEPHONE (OUTPATIENT)
Dept: PRIMARY CARE CLINIC | Age: 45
End: 2020-07-30

## 2020-07-30 NOTE — TELEPHONE ENCOUNTER
Pt needs new standing order for pt/inr in system, lab at [facility] told him this was the last month on the one he had in there.       Also please advise on his inr results from 7/25

## 2020-08-04 ENCOUNTER — TELEPHONE (OUTPATIENT)
Dept: PRIMARY CARE CLINIC | Age: 45
End: 2020-08-04

## 2020-08-04 RX ORDER — MOMETASONE FUROATE 1 MG/G
CREAM TOPICAL
Qty: 50 G | Refills: 3 | Status: SHIPPED | OUTPATIENT
Start: 2020-08-04 | End: 2021-06-14 | Stop reason: ALTCHOICE

## 2020-08-06 ENCOUNTER — OFFICE VISIT (OUTPATIENT)
Dept: ENDOCRINOLOGY | Age: 45
End: 2020-08-06
Payer: MEDICARE

## 2020-08-06 PROCEDURE — 99213 OFFICE O/P EST LOW 20 MIN: CPT | Performed by: INTERNAL MEDICINE

## 2020-08-06 PROCEDURE — 3051F HG A1C>EQUAL 7.0%<8.0%: CPT | Performed by: INTERNAL MEDICINE

## 2020-08-06 RX ORDER — LISINOPRIL 10 MG/1
TABLET ORAL
Qty: 90 TABLET | Refills: 3 | Status: SHIPPED | OUTPATIENT
Start: 2020-08-06 | End: 2021-02-24 | Stop reason: SDUPTHER

## 2020-08-06 NOTE — PROGRESS NOTES
2020    TELEHEALTH EVALUATION -- Audio/Visual (During [id number]-35 public health emergency)    Due to COVID 19 outbreak, patient's office visit was converted to a virtual visit. Patient was contacted and agreed to proceed with a virtual visit via Doxy. me  The risks and benefits of converting to a virtual visit were discussed in light of the current infectious disease epidemic. Patient also understood that insurance coverage and co-pays are up to their individual insurance plans. HPI: Patient made aware is a video visit billable visit agreed to proceed patient was at home I was at my office    Regine Doe (:  1975) has requested an audio/video evaluation for the following concern(s):    Follow-up on type 1 diabetes currently on insulin pump labs reviewed A1c was 7.4 down from 8.2 before  Complains of red area on the right little poison ivy requesting medications    Results for Federico Fryax (MRN [id number]) as of 2020 14:19   Ref. Range 2017 08:04 2018 09:16 2018 08:33 3/26/2019 10:44 2020 14:30 2020 08:59   Hemoglobin A1C Latest Ref Range: 4.8 - 5.9 % 7.8 (H) 7.2 6.9 (H) 9.1 (H) 8.2 7.4 (H)       Results for Federico Fryax (MRN [id number]) as of 2020 14:19   Ref.  Range 2020 12:10 2020 08:59 2020 10:23   Sodium Latest Ref Range: 135 - 144 mEq/L  141    Potassium Latest Ref Range: 3.4 - 4.9 mEq/L  3.9    Chloride Latest Ref Range: 95 - 107 mEq/L  102    CO2 Latest Ref Range: 20 - 31 mEq/L  23    BUN Latest Ref Range: 6 - 20 mg/dL  13    Creatinine Latest Ref Range: 0.70 - 1.20 mg/dL  0.57 (L)    Anion Gap Latest Ref Range: 9 - 15 mEq/L  16 (H)    GFR Non- Latest Ref Range: >60   >60.0    GFR African American Latest Ref Range: >60   >60.0    Glucose Latest Ref Range: 70 - 99 mg/dL  159 (H)    Calcium Latest Ref Range: 8.5 - 9.9 mg/dL  8.9    Cholesterol, Total Latest Ref Range: 0 - 199 mg/dL  186    HDL Cholesterol Latest Ref Range: 40 - 59 mg/dL  44    LDL Calculated Latest Ref Range: 0 - 129 mg/dL  67    Triglycerides Latest Ref Range: 0 - 150 mg/dL  376 (H)    Hemoglobin A1C Latest Ref Range: 4.8 - 5.9 %  7.4 (H)    Prothrombin Time Latest Ref Range: 12.3 - 14.9 sec 23.7 (H)  22.3 (H)   INR Unknown 2.1  2.0   Creatinine, Ur Latest Ref Range: Not Established mg/dL  123.6    Microalbumin Creatinine Ratio Latest Ref Range: 0.0 - 30.0 mg/G  46.1 (H)    Microalbumin, Random Urine Latest Ref Range: Not Established mg/dL  5.70 (H)        Review of Systems   Skin: Positive for rash. red area rt leg possible poison ivy        Prior to Visit Medications    Medication Sig Taking? Authorizing Provider   mometasone (ELOCON) 0.1 % cream Apply topically daily.   Joseline Goodman MD   simvastatin (ZOCOR) 20 MG tablet TAKE ONE (1) TABLET BY Verónica Taylor MD   insulin lispro (HUMALOG) 100 UNIT/ML injection vial USE VIA PUMP  UNITS PER DAY please give 6 vials per 30 days  Lary Mata MD   warfarin (COUMADIN) 5 MG tablet Take 2 tablets by mouth daily Take as directed  Joseline Goodman MD   brimonidine (ALPHAGAN) 0.2 % ophthalmic solution INSTILL ONE DROP INTO EACH EYE TWICE DAILY  Historical Provider, MD   insulin lispro (HUMALOG) 100 UNIT/ML injection vial USE AS DIRECTED MAX DOSE  UNITS PER DAY  Deangelo Del Cid MD   insulin detemir (LEVEMIR) 100 UNIT/ML injection vial INJECT 25 UNITS SUBCUTANEOUSLY NIGHTLY  Deangelo Del Cid MD   gabapentin (NEURONTIN) 600 MG tablet TAKE ONE TABLET BY MOUTH FOUR TIMES A DAY AS NEEDED  Historical Provider, MD   lisinopril (PRINIVIL;ZESTRIL) 10 MG tablet TAKE ONE TABLET BY MOUTH EVERY DAY  Lary Mata MD   metFORMIN (GLUCOPHAGE) 500 MG tablet Take 1 tablet by mouth 3 times daily  Lary Mata MD   vitamin D (ERGOCALCIFEROL) 58917 units CAPS capsule TAKE 1 CAPSULE BY MOUTH ONCE A WEEK  Historical Provider, MD   HUMIRA PEN 40 MG/0.8ML injection   Historical Provider, MD   HYDROcodone-acetaminophen skin    [] Cranial Nerves II-XII grossly intact    [] Motor grossly intact in visible upper extremities    [] Motor grossly intact in visible lower extremities    [] Normal Mood  [] Anxious appearing    [] Depressed appearing  [] Confused appearing      [] Poor short term memory  [] Poor long term memory    [] OTHER:      Due to this being a TeleHealth encounter, evaluation of the following organ systems is limited: Vitals/Constitutional/EENT/Resp/CV/GI//MS/Neuro/Skin/Heme-Lymph-Imm. ASSESSMENT/PLAN:   Diagnosis Orders   1. Uncontrolled type 1 diabetes mellitus without complication (HCC)  Basic Metabolic Panel    Hemoglobin A1C    Microalbumin / Creatinine Urine Ratio   2. Hypercholesteremia  lisinopril (PRINIVIL;ZESTRIL) 10 MG tablet     Orders Placed This Encounter   Procedures    Basic Metabolic Panel     Standing Status:   Future     Standing Expiration Date:   8/6/2021    Hemoglobin A1C     Standing Status:   Future     Standing Expiration Date:   8/6/2021    Microalbumin / Creatinine Urine Ratio     Standing Status:   Future     Standing Expiration Date:   8/6/2021     Orders Placed This Encounter   Medications    lisinopril (PRINIVIL;ZESTRIL) 10 MG tablet     Sig: TAKE ONE TABLET BY MOUTH EVERY DAY     Dispense:  90 tablet     Refill:  03     Continue insulin as per current pump setting patient to monitor the area closely hold off any steroids for now    Total time spent with patient was 16 minutes  An  electronic signature was used to authenticate this note. --Suzie Rocha MD on 8/6/2020 at 2:17 PM        Pursuant to the emergency declaration under the [facility], [facility] waiver authority and the [employer] Act, this Virtual  Visit was conducted, with patient's consent, to reduce the patient's risk of exposure to COVID-19 and provide continuity of care for an established patient.     Services were provided through a video synchronous discussion virtually to substitute for in-person clinic visit.

## 2020-09-12 DIAGNOSIS — I63.89 CEREBROVASCULAR ACCIDENT (CVA) DUE TO OTHER MECHANISM (HCC): ICD-10-CM

## 2020-09-12 LAB
INR BLD: 2.2
PROTHROMBIN TIME: 24.1 SEC (ref 12.3–14.9)

## 2020-09-15 ENCOUNTER — TELEPHONE (OUTPATIENT)
Dept: PRIMARY CARE CLINIC | Age: 45
End: 2020-09-15

## 2020-09-16 NOTE — TELEPHONE ENCOUNTER
1st attempt to reach patient.  Called patient @ [phone] and left message on machine for patient to return call during normal business hours of 8:30 AM and 5 PM @ [phone] option 2

## 2020-11-14 DIAGNOSIS — I63.89 CEREBROVASCULAR ACCIDENT (CVA) DUE TO OTHER MECHANISM (HCC): ICD-10-CM

## 2020-11-14 LAB
INR BLD: 1.7
PROTHROMBIN TIME: 20.3 SEC (ref 12.3–14.9)

## 2020-11-25 RX ORDER — GABAPENTIN 300 MG/1
300 CAPSULE ORAL 3 TIMES DAILY
Qty: 90 CAPSULE | Refills: 6 | OUTPATIENT
Start: 2020-11-25

## 2020-12-10 NOTE — TELEPHONE ENCOUNTER
Patient  requesting medication refill. Please approve or deny this request.    Rx requested:  Requested Prescriptions     Pending Prescriptions Disp Refills    mupirocin (BACTROBAN) 2 % ointment 22 g 0     Sig: Apply 3 times daily.          Last Office Visit:   1/8/2020      Next Visit Date:  Future Appointments   Date Time Provider Monica Mack   2/5/2021  9:00 AM Cristiane Augustin MD [facility]

## 2021-01-16 DIAGNOSIS — I63.89 CEREBROVASCULAR ACCIDENT (CVA) DUE TO OTHER MECHANISM (HCC): ICD-10-CM

## 2021-01-16 LAB
INR BLD: 2.3
PROTHROMBIN TIME: 25 SEC (ref 12.3–14.9)

## 2021-02-13 DIAGNOSIS — I63.89 CEREBROVASCULAR ACCIDENT (CVA) DUE TO OTHER MECHANISM (HCC): ICD-10-CM

## 2021-02-13 LAB
ANION GAP SERPL CALCULATED.3IONS-SCNC: 10 MEQ/L (ref 9–15)
BUN BLDV-MCNC: 12 MG/DL (ref 6–20)
CALCIUM SERPL-MCNC: 9.2 MG/DL (ref 8.5–9.9)
CHLORIDE BLD-SCNC: 101 MEQ/L (ref 95–107)
CO2: 28 MEQ/L (ref 20–31)
CREAT SERPL-MCNC: 0.58 MG/DL (ref 0.7–1.2)
CREATININE URINE: 181.5 MG/DL
GFR AFRICAN AMERICAN: >60
GFR NON-AFRICAN AMERICAN: >60
GLUCOSE BLD-MCNC: 171 MG/DL (ref 70–99)
HBA1C MFR BLD: 8.4 % (ref 4.8–5.9)
INR BLD: 1.8
MICROALBUMIN UR-MCNC: 3.3 MG/DL
MICROALBUMIN/CREAT UR-RTO: 18.2 MG/G (ref 0–30)
POTASSIUM SERPL-SCNC: 4.2 MEQ/L (ref 3.4–4.9)
PROTHROMBIN TIME: 21.1 SEC (ref 12.3–14.9)
SODIUM BLD-SCNC: 139 MEQ/L (ref 135–144)

## 2021-02-15 ENCOUNTER — TELEPHONE (OUTPATIENT)
Dept: PRIMARY CARE CLINIC | Age: 46
End: 2021-02-15

## 2021-02-24 ENCOUNTER — OFFICE VISIT (OUTPATIENT)
Dept: ENDOCRINOLOGY | Age: 46
End: 2021-02-24
Payer: MEDICARE

## 2021-02-24 VITALS
BODY MASS INDEX: 27.43 KG/M2 | DIASTOLIC BLOOD PRESSURE: 97 MMHG | HEIGHT: 68 IN | OXYGEN SATURATION: 96 % | HEART RATE: 100 BPM | SYSTOLIC BLOOD PRESSURE: 138 MMHG | WEIGHT: 181 LBS

## 2021-02-24 DIAGNOSIS — E78.00 HYPERCHOLESTEREMIA: ICD-10-CM

## 2021-02-24 DIAGNOSIS — E10.9 TYPE 1 DIABETES MELLITUS WITHOUT COMPLICATION (HCC): Primary | ICD-10-CM

## 2021-02-24 LAB
CHP ED QC CHECK: NORMAL
GLUCOSE BLD-MCNC: 143 MG/DL

## 2021-02-24 PROCEDURE — 82962 GLUCOSE BLOOD TEST: CPT | Performed by: INTERNAL MEDICINE

## 2021-02-24 PROCEDURE — 99213 OFFICE O/P EST LOW 20 MIN: CPT | Performed by: INTERNAL MEDICINE

## 2021-02-24 PROCEDURE — 3052F HG A1C>EQUAL 8.0%<EQUAL 9.0%: CPT | Performed by: INTERNAL MEDICINE

## 2021-02-24 RX ORDER — SIMVASTATIN 20 MG
20 TABLET ORAL NIGHTLY
Qty: 90 TABLET | Refills: 1 | Status: SHIPPED | OUTPATIENT
Start: 2021-02-24 | End: 2021-04-06 | Stop reason: SDUPTHER

## 2021-02-24 RX ORDER — LISINOPRIL 10 MG/1
TABLET ORAL
Qty: 90 TABLET | Refills: 3 | Status: SHIPPED | OUTPATIENT
Start: 2021-02-24 | End: 2021-08-25 | Stop reason: SDUPTHER

## 2021-02-24 NOTE — PROGRESS NOTES
Subjective:      Patient ID: Andreina Robles is a 54 y.o. male. Follow-up on her type 1 diabetes complications include severe diabetic retinopathy needing treatment for years reviewed pump download also concerned about cost of insulin  Discussed about Novolin regular insulin  Diabetes  He presents for his follow-up diabetic visit. He has type 1 diabetes mellitus. His disease course has been fluctuating. Associated symptoms include visual change. Diabetic complications include retinopathy. Current diabetic treatment includes insulin pump. His overall blood glucose range is 180-200 mg/dl. (Lab Results       Component                Value               Date                       LABA1C                   8.4 (H)             02/13/2021              Reviewed 2-week pump download  Basal rate 2 units/h carb ratio was 10 sensitivity was 50  No  blood sugar readings to review on the pump  Overall sugars in the upper 100-200 range occasionally low sugar and has had 3 hypoglycemic episode over the last 90 days  )        Results for Max Carty (MRN [id number]) as of 2/24/2021 14:59   Ref. Range 8/6/2018 08:33 3/26/2019 10:44 1/29/2020 14:30 7/25/2020 08:59 2/13/2021 09:21   Hemoglobin A1C Latest Ref Range: 4.8 - 5.9 % 6.9 (H) 9.1 (H) 8.2 7.4 (H) 8.4 (H)       Results for Max Carty (MRN [id number]) as of 2/24/2021 14:59   Ref.  Range 2/13/2021 09:21 2/13/2021 09:26 2/13/2021 09:27 2/24/2021 14:35   Sodium Latest Ref Range: 135 - 144 mEq/L 139      Potassium Latest Ref Range: 3.4 - 4.9 mEq/L 4.2      Chloride Latest Ref Range: 95 - 107 mEq/L 101      CO2 Latest Ref Range: 20 - 31 mEq/L 28      BUN Latest Ref Range: 6 - 20 mg/dL 12      Creatinine Latest Ref Range: 0.70 - 1.20 mg/dL 0.58 (L)      Anion Gap Latest Ref Range: 9 - 15 mEq/L 10      GFR Non- Latest Ref Range: >60  >60.0      GFR African American Latest Ref Range: >60  >60.0      Glucose Latest Units: mg/dL 171 (H)   143 Calcium Latest Ref Range: 8.5 - 9.9 mg/dL 9.2      Hemoglobin A1C Latest Ref Range: 4.8 - 5.9 % 8.4 (H)      Prothrombin Time Latest Ref Range: 12.3 - 14.9 sec   21.1 (H)    INR Unknown   1.8    Creatinine, Ur Latest Ref Range: Not Established mg/dL  181.5     Microalbumin Creatinine Ratio Latest Ref Range: 0.0 - 30.0 mg/G  18.2     Microalbumin, Random Urine Latest Ref Range: Not Established mg/dL  3.30 (H)       Patient Active Problem List   Diagnosis    Type 1 diabetes mellitus without complication (HCC)    Hypercholesteremia    Diabetic retinopathy (HCC)    Chest pain of uncertain etiology    Dizziness    SOB (shortness of breath)     No Known Allergies      Current Outpatient Medications:     insulin lispro (HUMALOG) 100 UNIT/ML injection vial, Use via pump max dose 150 units/day, Disp: 6 vial, Rfl: 3    lisinopril (PRINIVIL;ZESTRIL) 10 MG tablet, TAKE ONE TABLET BY MOUTH EVERY DAY, Disp: 90 tablet, Rfl: 03    simvastatin (ZOCOR) 20 MG tablet, Take 1 tablet by mouth nightly, Disp: 90 tablet, Rfl: 1    mometasone (ELOCON) 0.1 % cream, Apply topically daily. , Disp: 50 g, Rfl: 3    simvastatin (ZOCOR) 20 MG tablet, TAKE ONE (1) TABLET BY MOUTH EVERY EVENING, Disp: 90 tablet, Rfl: 1    warfarin (COUMADIN) 5 MG tablet, Take 2 tablets by mouth daily Take as directed, Disp: 180 tablet, Rfl: 3    brimonidine (ALPHAGAN) 0.2 % ophthalmic solution, INSTILL ONE DROP INTO EACH EYE TWICE DAILY, Disp: , Rfl: 6    insulin lispro (HUMALOG) 100 UNIT/ML injection vial, USE AS DIRECTED MAX DOSE  UNITS PER DAY, Disp: 50 mL, Rfl: 2    insulin detemir (LEVEMIR) 100 UNIT/ML injection vial, INJECT 25 UNITS SUBCUTANEOUSLY NIGHTLY, Disp: 10 mL, Rfl: 2    gabapentin (NEURONTIN) 600 MG tablet, TAKE ONE TABLET BY MOUTH FOUR TIMES A DAY AS NEEDED, Disp: , Rfl: 3    metFORMIN (GLUCOPHAGE) 500 MG tablet, Take 1 tablet by mouth 3 times daily, Disp: 270 tablet, Rfl: 03 fever/weakness   vitamin D (ERGOCALCIFEROL) 94791 units CAPS capsule, TAKE 1 CAPSULE BY MOUTH ONCE A WEEK, Disp: , Rfl: 3    HUMIRA PEN 40 MG/0.8ML injection, , Disp: , Rfl:     HYDROcodone-acetaminophen (NORCO) 5-325 MG per tablet, TAKE ONE TABLET BY MOUTH EVERY DAY, Disp: , Rfl: 0    timolol (TIMOPTIC) 0.5 % ophthalmic solution, INSTILL ONE DROP INTO EACH EYE TWICE DAILY, Disp: , Rfl: 1    prednisoLONE acetate (PRED FORTE) 1 % ophthalmic suspension, INSTILL 1 DROP INTO BOTH EYES 3 TIMES A DAY AS DIRECTED. USE PRE AND POST INTRAVITREAL INJECTIONS., Disp: , Rfl: 6    Insulin Pump Accessories MISC, by Does not apply route, Disp: , Rfl:     folic acid (FOLVITE) 1 MG tablet, Take 1 tablet by mouth daily, Disp: 90 tablet, Rfl: 1    fluticasone (FLONASE) 50 MCG/ACT nasal spray, 1 spray by Nasal route daily, Disp: 1 Bottle, Rfl: 5    gabapentin (NEURONTIN) 300 MG capsule, Take 1 capsule by mouth 3 times daily, Disp: 90 capsule, Rfl: 06    traMADol (ULTRAM) 50 MG tablet, take one or two tabs three times a day with Tylenol, Disp: , Rfl:     ALPHAGAN P 0.15 % ophthalmic solution, Place 1 drop into both eyes daily. , Disp: , Rfl:     Ranibizumab 0.3 MG/0.05ML SOLN, Gets from ophthalmologist, Disp: 1 vial, Rfl: 00    latanoprost (XALATAN) 0.005 % ophthalmic solution, Place 1 drop into both eyes. , Disp: , Rfl:     methotrexate 25 MG/ML injection, Inject 12.5 mg into the muscle once.  , Disp: , Rfl:     nortriptyline (PAMELOR) 25 MG capsule, Take 25 mg by mouth nightly.  , Disp: , Rfl:     insulin lispro (HUMALOG) 100 UNIT/ML injection vial, USE VIA PUMP  UNITS PER DAY please give 6 vials per 30 days Lot #[id number] exp: 1/23, Disp: 6 vial, Rfl: 3      Review of Systems   Eyes: Positive for visual disturbance. All other systems reviewed and are negative.       Vitals:    02/24/21 1435 02/24/21 1436   BP: (!) 149/98 (!) 138/97   Pulse: 100    SpO2: 96%    Weight: 181 lb (82.1 kg)    Height: 5' 8\" (1.727 m) Objective:   Physical Exam  Vitals signs reviewed. Constitutional:       Appearance: Normal appearance. He is normal weight. HENT:      Head: Normocephalic and atraumatic. Right Ear: External ear normal.      Left Ear: External ear normal.   Neck:      Musculoskeletal: Normal range of motion and neck supple. Cardiovascular:      Rate and Rhythm: Normal rate. Musculoskeletal: Normal range of motion. Neurological:      General: No focal deficit present. Mental Status: He is alert and oriented to person, place, and time. Assessment:       Diagnosis Orders   1. Type 1 diabetes mellitus without complication (HCC)  POCT Glucose    Basic Metabolic Panel    Hemoglobin A1C   2.  Hypercholesteremia  lisinopril (PRINIVIL;ZESTRIL) 10 MG tablet           Plan:      Orders Placed This Encounter   Procedures    Basic Metabolic Panel     Standing Status:   Future     Standing Expiration Date:   2/24/2022    Hemoglobin A1C     Standing Status:   Future     Standing Expiration Date:   2/24/2022    POCT Glucose     Continue patient on current pump setting adjusted maximum basal rate to 3 units/h  Repeat labs in 3 months time  Orders Placed This Encounter   Medications    insulin lispro (HUMALOG) 100 UNIT/ML injection vial     Sig: Use via pump max dose 150 units/day     Dispense:  6 vial     Refill:  3    lisinopril (PRINIVIL;ZESTRIL) 10 MG tablet     Sig: TAKE ONE TABLET BY MOUTH EVERY DAY     Dispense:  90 tablet     Refill:  03    simvastatin (ZOCOR) 20 MG tablet     Sig: Take 1 tablet by mouth nightly     Dispense:  90 tablet     Refill:  1             Chang Kwan MD

## 2021-02-25 ASSESSMENT — ENCOUNTER SYMPTOMS: VISUAL CHANGE: 1

## 2021-03-08 ENCOUNTER — VIRTUAL VISIT (OUTPATIENT)
Dept: PRIMARY CARE CLINIC | Age: 46
End: 2021-03-08
Payer: MEDICARE

## 2021-03-08 DIAGNOSIS — Z00.00 ROUTINE GENERAL MEDICAL EXAMINATION AT A HEALTH CARE FACILITY: Primary | ICD-10-CM

## 2021-03-08 PROCEDURE — G0438 PPPS, INITIAL VISIT: HCPCS | Performed by: NURSE PRACTITIONER

## 2021-03-08 ASSESSMENT — PATIENT HEALTH QUESTIONNAIRE - PHQ9
SUM OF ALL RESPONSES TO PHQ QUESTIONS 1-9: 0
SUM OF ALL RESPONSES TO PHQ QUESTIONS 1-9: 0
SUM OF ALL RESPONSES TO PHQ9 QUESTIONS 1 & 2: 0

## 2021-03-08 NOTE — PROGRESS NOTES
TAKE 1 CAPSULE BY MOUTH ONCE A WEEK  Historical Provider, MD   HUMIRA PEN 40 MG/0.8ML injection   Historical Provider, MD   HYDROcodone-acetaminophen (Frank Fine) 5-325 MG per tablet TAKE ONE TABLET BY MOUTH EVERY DAY  Historical Provider, MD   timolol (TIMOPTIC) 0.5 % ophthalmic solution INSTILL ONE DROP INTO EACH EYE TWICE DAILY  Historical Provider, MD   prednisoLONE acetate (PRED FORTE) 1 % ophthalmic suspension INSTILL 1 DROP INTO BOTH EYES 3 TIMES A DAY AS DIRECTED. USE PRE AND POST INTRAVITREAL INJECTIONS. Historical Provider, MD   Insulin Pump Accessories MISC by Does not apply route  Historical Provider, MD   folic acid (FOLVITE) 1 MG tablet Take 1 tablet by mouth daily  Atif Juarez MD   fluticasone [facility]) 50 MCG/ACT nasal spray 1 spray by Nasal route daily  Atif Juarez MD   gabapentin (NEURONTIN) 300 MG capsule Take 1 capsule by mouth 3 times daily  FLETCHER Headley MD   traMADol (ULTRAM) 50 MG tablet take one or two tabs three times a day with Tylenol  Historical Provider, MD   ALPHAGAN P 0.15 % ophthalmic solution Place 1 drop into both eyes daily. Historical Provider, MD   Ranibizumab 0.3 MG/0.05ML SOLN Gets from ophthalmologist  FLETCHER Headley MD   latanoprost (XALATAN) 0.005 % ophthalmic solution Place 1 drop into both eyes. Historical Provider, MD   methotrexate 25 MG/ML injection Inject 12.5 mg into the muscle once. Historical Provider, MD   nortriptyline (PAMELOR) 25 MG capsule Take 25 mg by mouth nightly. Historical Provider, MD         Past Medical History:   Diagnosis Date    Chest pain of uncertain etiology 0/55/2605    Dizziness 9/28/2017    DVT of leg (deep venous thrombosis) (HCC)     x2    Hyperlipidemia     Hypertension     Rheumatoid arthritis ([address]     dr Saundra Chowdary ht.  Smoker     quit 2008    SOB (shortness of breath) 9/28/2017    Stroke ([facility]     bleed    Type 1 diabetes mellitus, uncontrolled ([facility]        No past surgical history on file.       Family History Problem Relation Age of Onset    Clotting Disorder Mother         blood clots       CareTeam (Including outside providers/suppliers regularly involved in providing care):   Patient Care Team:  Vikas Pedersen MD as PCP - General (Internal Medicine)  Vikas Pedersen MD as PCP - [facility] Empaneled Provider  Willian Crowe MD (Endocrinology)    Wt Readings from Last 3 Encounters:   02/24/21 181 lb (82.1 kg)   01/29/20 183 lb 9.6 oz (83.3 kg)   01/08/20 183 lb (83 kg)      Patient-Reported Vitals 3/8/2021   Patient-Reported Weight 181 lbs   Patient-Reported Height 5ft8      There is no height or weight on file to calculate BMI. Based upon direct observation of the patient, evaluation of cognition reveals recent and remote memory intact. Patient's complete Health Risk Assessment and screening values have been reviewed and are found in Flowsheets. The following problems were reviewed today and where indicated follow up appointments were made and/or referrals ordered. Positive Risk Factor Screenings with Interventions:            General Health and ACP:  General  In general, how would you say your health is?: Fair  In the past 7 days, have you experienced any of the following?  New or Increased Pain, New or Increased Fatigue, Loneliness, Social Isolation, Stress or Anger?: None of These  Do you get the social and emotional support that you need?: Yes  Do you have a Living Will?: (!) No  Advance Directives     Power of [address] [facility] Will ACP-Advance Directive ACP-Power of     Not on File Not on File Not on File Not on File      General Health Risk Interventions:  · No Living Will: Patient declines ACP discussion/assistance    Health Habits/Nutrition:  Health Habits/Nutrition  Do you exercise for at least 20 minutes 2-3 times per week?: Yes  Have you lost any weight without trying in the past 3 months?: No  Do you eat only one meal per day?: No  Have you seen the dentist within the past year?: (!) No(discussed with pt the importance of cleaning)     Health Habits/Nutrition Interventions:  · Dental exam overdue:  patient declines dental evaluation    Hearing/Vision:  No exam data present  Hearing/Vision  Do you or your family notice any trouble with your hearing that hasn't been managed with hearing aids?: No  Do you have difficulty driving, watching TV, or doing any of your daily activities because of your eyesight?: (!) Yes(pt does not drive, reports he has no peripheral vision- diabetic retinopathy)  Have you had an eye exam within the past year?: Yes  Hearing/Vision Interventions:  · Vision concerns:  patient declines any further evaluation/treatment for this issue, pt reports getting shots in his eyes and seeing eye specialist every other month for viion test  · pt has diabetic retinopathy- sees a specialist- gets shots in eyes     ADL:  ADLs  In the past 7 days, did you need help from others to perform any of the following everyday activities? Eating, dressing, grooming, bathing, toileting, or walking/balance?: None  In the past 7 days, did you need help from others to take care of any of the following?  Laundry, housekeeping, banking/finances, shopping, telephone use, food preparation, transportation, or taking medications?: (!) Transportation(pt reports his wife drives him)  ADL Interventions:  · Patient declines any further evaluation/treatment for this issue    Personalized Preventive Plan   Current Health Maintenance Status  Immunization History   Administered Date(s) Administered    PPD Test 09/27/2017        Health Maintenance   Topic Date Due    Hepatitis C screen  Never done    Pneumococcal 0-64 years Vaccine (1 of 1 - PPSV23) Never done    HIV screen  Never done    COVID-19 Vaccine (1 of 2) Never done    Hepatitis B vaccine (1 of 3 - Risk 3-dose series) Never done    DTaP/Tdap/Td vaccine (1 - Tdap) Never done    Diabetic retinal exam  12/01/2017    Annual Wellness Visit (AWV)  Never done    Flu vaccine (1) Never done    Diabetic foot exam  01/29/2021    Lipid screen  07/25/2021    A1C test (Diabetic or Prediabetic)  02/13/2022    Diabetic microalbuminuria test  02/13/2022    Potassium monitoring  02/13/2022    Creatinine monitoring  02/13/2022    Hepatitis A vaccine  Aged Out    Hib vaccine  Aged Out    Meningococcal (ACWY) vaccine  Aged Out     Recommendations for [place] Due: see orders and patient instructions/AVS.  . Recommended screening schedule for the next 5-10 years is provided to the patient in written form: see Patient Maura Calixto was seen today for medicare awv. Diagnoses and all orders for this visit:    Routine general medical examination at a health care facility               Barrera So is a 54 y.o. male being evaluated by a Virtual Visit (phone) encounter to address concerns as mentioned above. A caregiver was present when appropriate. Due to this being a TeleHealth encounter (During [facility]- public health emergency), evaluation of the following organ systems was limited: Vitals/Constitutional/EENT/Resp/CV/GI//MS/Neuro/Skin/Heme-Lymph-Imm. Pursuant to the emergency declaration under the [facility], [address] [place] [address] authority and the [employer] Act, this Virtual Visit was conducted with patient's (and/or legal guardian's) consent, to reduce the patient's risk of exposure to COVID-19 and provide necessary medical care. The patient (and/or legal guardian) has also been advised to contact this office for worsening conditions or problems, and seek emergency medical treatment and/or call 911 if deemed necessary. Patient identification was verified at the start of the visit: Yes    Services were provided through phone to substitute for in-person clinic visit. Patient and provider were located at their individual homes.     --TARA Schilling - JHONY on 3/8/2021 at 12:08 PM    An electronic signature was used to authenticate this note.

## 2021-03-08 NOTE — PATIENT INSTRUCTIONS
Personalized Preventive Plan for [facility] - 3/8/2021  Medicare offers a range of preventive health benefits. Some of the tests and screenings are paid in full while other may be subject to a deductible, co-insurance, and/or copay. Some of these benefits include a comprehensive review of your medical history including lifestyle, illnesses that may run in your family, and various assessments and screenings as appropriate. After reviewing your medical record and screening and assessments performed today your provider may have ordered immunizations, labs, imaging, and/or referrals for you. A list of these orders (if applicable) as well as your Preventive Care list are included within your After Visit Summary for your review. Other Preventive Recommendations:    · A preventive eye exam performed by an eye specialist is recommended every 1-2 years to screen for glaucoma; cataracts, macular degeneration, and other eye disorders. · A preventive dental visit is recommended every 6 months. · Try to get at least 150 minutes of exercise per week or 10,000 steps per day on a pedometer . · Order or download the FREE \"Exercise & Physical Activity: Your Everyday Guide\" from [employer]. Call 7-132.925.2441 or search [employer] online. · You need 6771-0449 mg of calcium and 6134-0123 IU of vitamin D per day. It is possible to meet your calcium requirement with diet alone, but a vitamin D supplement is usually necessary to meet this goal.  · When exposed to the sun, use a sunscreen that protects against both UVA and UVB radiation with an SPF of 30 or greater. Reapply every 2 to 3 hours or after sweating, drying off with a towel, or swimming. · Always wear a seat belt when traveling in a car. Always wear a helmet when riding a bicycle or motorcycle.   Patient Education        Well Visit, Ages 25 to 48: Care Instructions  Your Care Instructions     Physical exams can help you stay healthy. Your doctor has checked your overall health and may have suggested ways to take good care of yourself. He or she also may have recommended tests. At home, you can help prevent illness with healthy eating, regular exercise, and other steps. Follow-up care is a key part of your treatment and safety. Be sure to make and go to all appointments, and call your doctor if you are having problems. It's also a good idea to know your test results and keep a list of the medicines you take. How can you care for yourself at home? Reach and stay at a healthy weight. This will lower your risk for many problems, such as obesity, diabetes, heart disease, and high blood pressure. Get at least 30 minutes of physical activity on most days of the week. Walking is a good choice. You also may want to do other activities, such as running, swimming, cycling, or playing tennis or team sports. Discuss any changes in your exercise program with your doctor. Do not smoke or allow others to smoke around you. If you need help quitting, talk to your doctor about stop-smoking programs and medicines. These can increase your chances of quitting for good. Talk to your doctor about whether you have any risk factors for sexually transmitted infections (STIs). Having one sex partner (who does not have STIs and does not have sex with anyone else) is a good way to avoid these infections. Use birth control if you do not want to have children at this time. Talk with your doctor about the choices available and what might be best for you. Protect your skin from too much sun. When you're outdoors from 10 a.m. to 4 p.m., stay in the shade or cover up with clothing and a hat with a wide brim. Wear sunglasses that block UV rays. Even when it's cloudy, put broad-spectrum sunscreen (SPF 30 or higher) on any exposed skin. See a dentist one or two times a year for checkups and to have your teeth cleaned. Wear a seat belt in the car. if you do not wear a condom. Testicular cancer exam. Ask your doctor whether you should check your testicles regularly. Prostate exam. Talk to your doctor about whether you should have a blood test (called a PSA test) for prostate cancer. Experts differ on whether and when men should have this test. Some experts suggest it if you are older than 39 and are -American or have a father or brother who got prostate cancer when he was younger than 72. When should you call for help? Watch closely for changes in your health, and be sure to contact your doctor if you have any problems or symptoms that concern you. Where can you learn more? Go to https://[email or web address] org and sign in to your [employer] account. Enter P072 in the [employer] box to learn more about \"Well Visit, Ages 25 to 48: Care Instructions. \"     If you do not have an account, please click on the \"Sign Up Now\" link. Current as of: May 27, 2020               Content Version: 12.6  © 6398-5389 [employer]. Care instructions adapted under license by [facility] ([facility]). If you have questions about a medical condition or this instruction, always ask your healthcare professional. [place] [address] any warranty or liability for your use of this information.

## 2021-03-20 DIAGNOSIS — I63.89 CEREBROVASCULAR ACCIDENT (CVA) DUE TO OTHER MECHANISM (HCC): ICD-10-CM

## 2021-03-20 LAB
INR BLD: 2.2
PROTHROMBIN TIME: 24.5 SEC (ref 12.3–14.9)

## 2021-03-23 ENCOUNTER — TELEPHONE (OUTPATIENT)
Dept: PRIMARY CARE CLINIC | Age: 46
End: 2021-03-23

## 2021-04-06 ENCOUNTER — OFFICE VISIT (OUTPATIENT)
Dept: FAMILY MEDICINE CLINIC | Age: 46
End: 2021-04-06
Payer: MEDICARE

## 2021-04-06 VITALS
TEMPERATURE: 98.4 F | WEIGHT: 181 LBS | HEIGHT: 69 IN | BODY MASS INDEX: 26.81 KG/M2 | OXYGEN SATURATION: 97 % | DIASTOLIC BLOOD PRESSURE: 80 MMHG | SYSTOLIC BLOOD PRESSURE: 120 MMHG | HEART RATE: 108 BPM

## 2021-04-06 DIAGNOSIS — B96.89 ACUTE BACTERIAL SINUSITIS: Primary | ICD-10-CM

## 2021-04-06 DIAGNOSIS — H92.02 ACUTE OTALGIA, LEFT: ICD-10-CM

## 2021-04-06 DIAGNOSIS — J01.90 ACUTE BACTERIAL SINUSITIS: Primary | ICD-10-CM

## 2021-04-06 PROCEDURE — 99213 OFFICE O/P EST LOW 20 MIN: CPT | Performed by: NURSE PRACTITIONER

## 2021-04-06 RX ORDER — AZELASTINE 1 MG/ML
1 SPRAY, METERED NASAL 2 TIMES DAILY
Qty: 1 BOTTLE | Refills: 1 | Status: SHIPPED | OUTPATIENT
Start: 2021-04-06 | End: 2022-04-26 | Stop reason: SDUPTHER

## 2021-04-06 RX ORDER — CELECOXIB 100 MG/1
CAPSULE ORAL
COMMUNITY
Start: 2021-02-24

## 2021-04-06 RX ORDER — AMOXICILLIN AND CLAVULANATE POTASSIUM 875; 125 MG/1; MG/1
1 TABLET, FILM COATED ORAL 2 TIMES DAILY
Qty: 14 TABLET | Refills: 0 | Status: SHIPPED | OUTPATIENT
Start: 2021-04-06 | End: 2021-04-13

## 2021-04-06 SDOH — ECONOMIC STABILITY: TRANSPORTATION INSECURITY
IN THE PAST 12 MONTHS, HAS LACK OF TRANSPORTATION KEPT YOU FROM MEETINGS, WORK, OR FROM GETTING THINGS NEEDED FOR DAILY LIVING?: NO

## 2021-04-06 SDOH — ECONOMIC STABILITY: TRANSPORTATION INSECURITY
IN THE PAST 12 MONTHS, HAS THE LACK OF TRANSPORTATION KEPT YOU FROM MEDICAL APPOINTMENTS OR FROM GETTING MEDICATIONS?: NO

## 2021-04-06 ASSESSMENT — ENCOUNTER SYMPTOMS
ABDOMINAL DISTENTION: 0
COUGH: 0
DIARRHEA: 0
CHEST TIGHTNESS: 0
TROUBLE SWALLOWING: 0
WHEEZING: 0
BACK PAIN: 0
CONSTIPATION: 0
COLOR CHANGE: 0
NAUSEA: 0
SORE THROAT: 1
RHINORRHEA: 0
ABDOMINAL PAIN: 0
VOMITING: 0
SINUS PRESSURE: 1
SHORTNESS OF BREATH: 0
SINUS PAIN: 1

## 2021-04-06 NOTE — PATIENT INSTRUCTIONS
Patient Education        Earache: Care Instructions  Your Care Instructions     Even though infection is a common cause of ear pain, not all ear pain means an infection. If you have ear pain and don't have an infection, it could be because of a jaw problem, such as temporomandibular joint (TMJ) pain. Or it could be because of a neck problem. When ear discomfort or pain is mild or comes and goes without other symptoms, home treatment may be all you need. Follow-up care is a key part of your treatment and safety. Be sure to make and go to all appointments, and call your doctor if you are having problems. It's also a good idea to know your test results and keep a list of the medicines you take. How can you care for yourself at home? · Apply heat on the ear to ease pain. To apply heat, put a warm water bottle, a heating pad set on low, or a warm cloth on your ear. Do not go to sleep with a heating pad on your skin. · Take an over-the-counter pain medicine, such as acetaminophen (Tylenol), ibuprofen (Advil, Motrin), or naproxen (Aleve). Be safe with medicines. Read and follow all instructions on the label. · Do not take two or more pain medicines at the same time unless the doctor told you to. Many pain medicines have acetaminophen, which is Tylenol. Too much acetaminophen (Tylenol) can be harmful. · Never insert anything, such as a cotton swab or a gabe pin, into the ear. When should you call for help? Call your doctor now or seek immediate medical care if:    · You have new or worse symptoms of infection, such as:  ? Increased pain, swelling, warmth, or redness. ? Red streaks leading from the area. ? Pus draining from the area. ? A fever. Watch closely for changes in your health, and be sure to contact your doctor if:    · You have new or worse discharge coming from the ear.     · You do not get better as expected. Where can you learn more? Go to [email or web address]://sarah.[email or web address] org and sign in

## 2021-04-06 NOTE — PROGRESS NOTES
Subjective  Jon Mcknight, 54 y.o. male presents today with:  Chief Complaint   Patient presents with    Otalgia     pt states left ear pain x 3 days. pt states just painful. pt states soreness/swelling down left side. pt states sinus pressure. pt states he has taken tylenol and allergy meds. Otalgia   There is pain in the left ear. This is a new problem. The current episode started in the past 7 days (3 days, history of previous sinus infections frequently with similar progression). The problem occurs constantly. The problem has been gradually worsening. There has been no fever. The pain is at a severity of 5/10. Associated symptoms include a sore throat. Pertinent negatives include no abdominal pain, coughing, diarrhea, ear discharge, headaches, hearing loss, neck pain, rash, rhinorrhea or vomiting. He has tried acetaminophen and NSAIDs for the symptoms. The treatment provided mild relief. There is no history of a chronic ear infection, hearing loss or a tympanostomy tube. Review of Systems   Constitutional: Positive for fatigue. Negative for activity change, appetite change, chills, diaphoresis and fever. HENT: Positive for congestion, ear pain, postnasal drip, sinus pressure, sinus pain and sore throat. Negative for ear discharge, hearing loss, rhinorrhea and trouble swallowing. Eyes: Negative for visual disturbance. Respiratory: Negative for cough, chest tightness, shortness of breath and wheezing. Cardiovascular: Negative for chest pain and palpitations. Gastrointestinal: Negative for abdominal distention, abdominal pain, constipation, diarrhea, nausea and vomiting. Genitourinary: Negative for difficulty urinating, dysuria, flank pain, frequency and urgency. Musculoskeletal: Negative for arthralgias, back pain, myalgias, neck pain and neck stiffness. Skin: Negative for color change and rash.    Neurological: Negative for dizziness, tremors, seizures, syncope, speech difficulty, weakness, light-headedness, numbness and headaches. Past Medical History:   Diagnosis Date    Chest pain of uncertain etiology     Dizziness 2017    DVT of leg (deep venous thrombosis) (HCC)     x2    Hyperlipidemia     Hypertension     Rheumatoid arthritis ([facility]     dr Ender Peña ht.  Smoker     quit     SOB (shortness of breath) 2017    Stroke ([facility]     bleed    Type 1 diabetes mellitus, uncontrolled ([facility]      No past surgical history on file.   Social History     Socioeconomic History    Marital status:      Spouse name: Not on file    Number of children: Not on file    Years of education: Not on file    Highest education level: Not on file   Occupational History    Not on file   Social Needs    Financial resource strain: Not hard at all    Food insecurity     Worry: Never true     Inability: Never true   [employer] needs     Medical: No     Non-medical: No   Tobacco Use    Smoking status: Former Smoker     Quit date:      Years since quittin.2    Smokeless tobacco: Never Used   Substance and Sexual Activity    Alcohol use: No    Drug use: No    Sexual activity: Not on file   Lifestyle    Physical activity     Days per week: Not on file     Minutes per session: Not on file    Stress: Not on file   Relationships    Social connections     Talks on phone: Not on file     Gets together: Not on file     Attends Islam service: Not on file     Active member of club or organization: Not on file     Attends meetings of clubs or organizations: Not on file     Relationship status: Not on file    Intimate partner violence     Fear of current or ex partner: Not on file     Emotionally abused: Not on file     Physically abused: Not on file     Forced sexual activity: Not on file   Other Topics Concern    Not on file   Social History Narrative    Not on file     Family History   Problem Relation Age of Onset    Clotting Accessories MISC by Does not apply route      folic acid (FOLVITE) 1 MG tablet Take 1 tablet by mouth daily 90 tablet 1    fluticasone (FLONASE) 50 MCG/ACT nasal spray 1 spray by Nasal route daily 1 Bottle 5    traMADol (ULTRAM) 50 MG tablet take one or two tabs three times a day with Tylenol      ALPHAGAN P 0.15 % ophthalmic solution Place 1 drop into both eyes daily.  Ranibizumab 0.3 MG/0.05ML SOLN Gets from ophthalmologist 1 vial 00    latanoprost (XALATAN) 0.005 % ophthalmic solution Place 1 drop into both eyes.  methotrexate 25 MG/ML injection Inject 12.5 mg into the muscle once.  HUMIRA PEN 40 MG/0.8ML injection       HYDROcodone-acetaminophen (NORCO) 5-325 MG per tablet TAKE ONE TABLET BY MOUTH EVERY DAY  0    nortriptyline (PAMELOR) 25 MG capsule Take 25 mg by mouth nightly. No current facility-administered medications for this visit. PMH, Surgical Hx, Family Hx, and Social Hx reviewed and updated. Health Maintenance reviewed. Objective    Vitals:    04/06/21 1712   BP: 120/80   Pulse: 108   Temp: 98.4 °F (36.9 °C)   SpO2: 97%   Weight: 181 lb (82.1 kg)   Height: 5' 9\" (1.753 m)       Physical Exam  Constitutional:       General: He is not in acute distress. Appearance: Normal appearance. He is normal weight. He is not ill-appearing, toxic-appearing or diaphoretic. HENT:      Head: Normocephalic and atraumatic. Right Ear: Hearing, tympanic membrane, ear canal and external ear normal. There is no impacted cerumen. Left Ear: Hearing normal. Tenderness present. A middle ear effusion (mild) is present. There is no impacted cerumen. Tympanic membrane is bulging. Tympanic membrane is not erythematous. Mouth/Throat:      Mouth: Mucous membranes are moist.      Pharynx: Oropharynx is clear. No oropharyngeal exudate or posterior oropharyngeal erythema. Eyes:      General:         Right eye: No discharge. Left eye: No discharge. Order Specific Question:   Hospitalized for COVID-19? Answer:   No     Order Specific Question:   Admitted to ICU for COVID-19? Answer:   No     Order Specific Question:   Employed in healthcare setting? Answer:   No     Order Specific Question:   Resident in a congregate (group) care setting? Answer:   No     Order Specific Question:   Pregnant: Answer:   No     Order Specific Question:   Previously tested for COVID-19? Answer:   No     Orders Placed This Encounter   Medications    amoxicillin-clavulanate (AUGMENTIN) 875-125 MG per tablet     Sig: Take 1 tablet by mouth 2 times daily for 7 days     Dispense:  14 tablet     Refill:  0    azelastine (ASTELIN) 0.1 % nasal spray     Si spray by Nasal route 2 times daily Use in each nostril as directed     Dispense:  1 Bottle     Refill:  1     Medications Discontinued During This Encounter   Medication Reason    gabapentin (NEURONTIN) 300 MG capsule LIST CLEANUP    simvastatin (ZOCOR) 20 MG tablet DUPLICATE      Return in about 1 week (around 2021), or if symptoms worsen or fail to improve, for follow up with PCP. Reviewed with the patient: current clinical status,medications, activities and diet. Side effects, adverse effects of themedication prescribed today, as well as treatment plan/ rationale and result expectations have been discussed with the patient who expresses understanding and desires to proceed. Close follow up to evaluate treatment results and for coordination of care. I have reviewed the patient's medical history in detail and updated the computerized patient record.      TARA Corey - CNP

## 2021-04-07 DIAGNOSIS — H92.02 ACUTE OTALGIA, LEFT: ICD-10-CM

## 2021-04-07 DIAGNOSIS — B96.89 ACUTE BACTERIAL SINUSITIS: ICD-10-CM

## 2021-04-07 DIAGNOSIS — J01.90 ACUTE BACTERIAL SINUSITIS: ICD-10-CM

## 2021-04-08 LAB
SARS-COV-2: NOT DETECTED
SOURCE: NORMAL

## 2021-05-15 DIAGNOSIS — I63.89 CEREBROVASCULAR ACCIDENT (CVA) DUE TO OTHER MECHANISM (HCC): ICD-10-CM

## 2021-05-15 LAB
INR BLD: 1.7
PROTHROMBIN TIME: 19.7 SEC (ref 12.3–14.9)

## 2021-06-14 ENCOUNTER — OFFICE VISIT (OUTPATIENT)
Dept: FAMILY MEDICINE CLINIC | Age: 46
End: 2021-06-14
Payer: MEDICARE

## 2021-06-14 VITALS
TEMPERATURE: 98.3 F | OXYGEN SATURATION: 95 % | WEIGHT: 185 LBS | BODY MASS INDEX: 27.4 KG/M2 | SYSTOLIC BLOOD PRESSURE: 134 MMHG | DIASTOLIC BLOOD PRESSURE: 82 MMHG | HEIGHT: 69 IN | HEART RATE: 101 BPM

## 2021-06-14 DIAGNOSIS — B96.89 ACUTE BACTERIAL SINUSITIS: Primary | ICD-10-CM

## 2021-06-14 DIAGNOSIS — J01.90 ACUTE BACTERIAL SINUSITIS: Primary | ICD-10-CM

## 2021-06-14 PROCEDURE — 99213 OFFICE O/P EST LOW 20 MIN: CPT | Performed by: NURSE PRACTITIONER

## 2021-06-14 RX ORDER — CEFDINIR 300 MG/1
300 CAPSULE ORAL 2 TIMES DAILY
Qty: 20 CAPSULE | Refills: 0 | Status: SHIPPED | OUTPATIENT
Start: 2021-06-14 | End: 2021-06-24

## 2021-06-14 SDOH — ECONOMIC STABILITY: FOOD INSECURITY: WITHIN THE PAST 12 MONTHS, YOU WORRIED THAT YOUR FOOD WOULD RUN OUT BEFORE YOU GOT MONEY TO BUY MORE.: NEVER TRUE

## 2021-06-14 SDOH — ECONOMIC STABILITY: FOOD INSECURITY: WITHIN THE PAST 12 MONTHS, THE FOOD YOU BOUGHT JUST DIDN'T LAST AND YOU DIDN'T HAVE MONEY TO GET MORE.: NEVER TRUE

## 2021-06-14 ASSESSMENT — ENCOUNTER SYMPTOMS
COUGH: 0
NAUSEA: 0
CHEST TIGHTNESS: 0
RHINORRHEA: 1
ABDOMINAL PAIN: 0
SORE THROAT: 1
SHORTNESS OF BREATH: 0
WHEEZING: 0

## 2021-06-14 ASSESSMENT — SOCIAL DETERMINANTS OF HEALTH (SDOH): HOW HARD IS IT FOR YOU TO PAY FOR THE VERY BASICS LIKE FOOD, HOUSING, MEDICAL CARE, AND HEATING?: NOT HARD AT ALL

## 2021-06-14 NOTE — PROGRESS NOTES
Subjective  Francis Menard, 51 y.o. male presents today with:  Chief Complaint   Patient presents with    Pharyngitis     states that symptoms started Friday    Sinusitis    Immunizations     fully vaccinated last one 6/6/21       HPI  Presents to [facility] for sore throat & sinus pain/pressure   Symptoms started Friday & have worsened   Sinus pressure > left side  Fatigued  Ears feel \"full\"  Headache and dizzy feeling when ambulating   Ate well yesterday. Today no appetite   Denies cough   Thick phlegm nasal/sinus  Denies fever or chills   No other sick contacts in household     Took allergy medication w/o relief   Has taken Tylenol for the throat discomfort  #2 COVID-19 vaccine on 6/6   Type 1 diabetic        Past Medical History:   Diagnosis Date    Chest pain of uncertain etiology 7/29/4575    Dizziness 9/28/2017    DVT of leg (deep venous thrombosis) (HCC)     x2    Hyperlipidemia     Hypertension     Rheumatoid arthritis ([facility]     dr Kim Mon ht.  Smoker     quit 2008    SOB (shortness of breath) 9/28/2017    Stroke ([facility]     bleed    Type 1 diabetes mellitus, uncontrolled ([facility]       No past surgical history on file. Family History   Problem Relation Age of Onset    Clotting Disorder Mother         blood clots           Review of Systems   Constitutional: Positive for activity change, appetite change, diaphoresis and fatigue. Negative for fever. HENT: Positive for congestion, ear pain, rhinorrhea and sore throat. Respiratory: Negative for cough, chest tightness, shortness of breath and wheezing. Cardiovascular: Negative for chest pain and palpitations. Gastrointestinal: Negative for abdominal pain, diarrhea (more loose) and nausea. Musculoskeletal: Negative for myalgias. Neurological: Positive for dizziness and headaches. Negative for light-headedness. PMH, Surgical Hx, Family Hx, and Social Hx reviewed and updated.   Health Maintenance reviewed. Objective  Vitals:    06/14/21 1654   BP: 134/82   Site: Left Upper Arm   Position: Sitting   Cuff Size: Medium Adult   Pulse: 101   Temp: 98.3 °F (36.8 °C)   TempSrc: Tympanic   SpO2: 95%   Weight: 185 lb (83.9 kg)   Height: 5' 9\" (1.753 m)     BP Readings from Last 3 Encounters:   06/14/21 134/82   04/06/21 120/80   02/24/21 (!) 138/97     Wt Readings from Last 3 Encounters:   06/14/21 185 lb (83.9 kg)   04/06/21 181 lb (82.1 kg)   02/24/21 181 lb (82.1 kg)           Physical Exam  Constitutional:       General: He is not in acute distress. Appearance: He is ill-appearing. He is not diaphoretic. HENT:      Right Ear: Hearing, tympanic membrane, ear canal and external ear normal.      Left Ear: Hearing, tympanic membrane, ear canal and external ear normal.      Nose: Congestion present. Mouth/Throat:      Lips: Pink. Mouth: Mucous membranes are moist.      Pharynx: Uvula midline. Posterior oropharyngeal erythema present. No oropharyngeal exudate or uvula swelling. Eyes:      Conjunctiva/sclera: Conjunctivae normal.   Cardiovascular:      Rate and Rhythm: Normal rate. Pulmonary:      Effort: Pulmonary effort is normal.      Breath sounds: Examination of the right-upper field reveals rhonchi. Rhonchi present. Comments:    Musculoskeletal:         General: Normal range of motion. Cervical back: Normal range of motion. Skin:     General: Skin is warm and dry. Coloration: Skin is not pale. Findings: No rash. Neurological:      General: No focal deficit present. Mental Status: He is alert and oriented to person, place, and time. Psychiatric:         Mood and Affect: Mood normal.           Assessment & Plan    Diagnosis Orders   1. Acute bacterial sinusitis  cefdinir (OMNICEF) 300 MG capsule     No orders of the defined types were placed in this encounter.     Orders Placed This Encounter   Medications    cefdinir (OMNICEF) 300 MG capsule     Sig: Take 1 capsule by mouth 2 times daily for 10 days     Dispense:  20 capsule     Refill:  0     Return if symptoms worsen or fail to improve. Reviewed with the patient: current clinical status & medications. Side effects, adverse effects of the medication prescribed today, as well as treatment plan/rationale and result expectations have been discussed with the patient who expressed understanding. How can you care for yourself at home? · Take an over-the-counter pain medicine, such as acetaminophen (Tylenol), ibuprofen (Advil, Motrin), or naproxen (Aleve). Read and follow all instructions on the label. · If the doctor prescribed antibiotics, take them as directed. Do not stop taking them just because you feel better. You need to take the full course of antibiotics. · Be careful when taking over-the-counter cold or flu medicines and Tylenol at the same time. Many of these medicines have acetaminophen, which is Tylenol. Read the labels to make sure that you are not taking more than the recommended dose. Too much acetaminophen (Tylenol) can be harmful. · Breathe warm, moist air from a steamy shower, a hot bath, or a sink filled with hot water. Avoid cold, dry air. Using a humidifier in your home may help. Follow the directions for cleaning the machine. · Use saline (saltwater) nasal washes. This can help keep your nasal passages open and wash out mucus and bacteria. You can buy saline nose drops at a grocery store or drugstore. Or you can make your own at home by adding 1 teaspoon of salt and 1 teaspoon of baking soda to 2 cups of distilled water. If you make your own, fill a bulb syringe with the solution, insert the tip into your nostril, and squeeze gently. Slick Better your nose. · Put a hot, wet towel or a warm gel pack on your face 3 or 4 times a day for 5 to 10 minutes each time. When should you call for help?    Call your doctor now or seek immediate medical care if:    · You have new or worse swelling or redness in your face or around your eyes. · You have a new or higher fever. Close follow up to evaluate treatment results and for coordination of care. I have reviewed the patient's medical history in detail and updated the computerized patient record.         TARA Barcenas NP

## 2021-06-15 ASSESSMENT — ENCOUNTER SYMPTOMS: DIARRHEA: 0

## 2021-06-24 ENCOUNTER — TELEPHONE (OUTPATIENT)
Dept: PRIMARY CARE CLINIC | Age: 46
End: 2021-06-24

## 2021-06-24 DIAGNOSIS — I63.89 CEREBROVASCULAR ACCIDENT (CVA) DUE TO OTHER MECHANISM (HCC): Primary | ICD-10-CM

## 2021-07-07 DIAGNOSIS — I63.89 CEREBROVASCULAR ACCIDENT (CVA) DUE TO OTHER MECHANISM (HCC): ICD-10-CM

## 2021-07-07 LAB
INR BLD: 2
PROTHROMBIN TIME: 22.4 SEC (ref 12.3–14.9)

## 2021-08-11 ENCOUNTER — HOSPITAL ENCOUNTER (OUTPATIENT)
Dept: NEUROLOGY | Age: 46
Discharge: HOME | End: 2021-08-11
Payer: MEDICARE

## 2021-08-11 DIAGNOSIS — G56.03 CARPAL TUNNEL SYNDROME, BILATERAL: ICD-10-CM

## 2021-08-11 PROCEDURE — 95886 MUSC TEST DONE W/N TEST COMP: CPT

## 2021-08-11 PROCEDURE — 95910 NRV CNDJ TEST 7-8 STUDIES: CPT

## 2021-08-11 NOTE — PROCEDURES
Luci Barillas [address]                      [facility], [address] [facility]                             ELECTROMYOGRAM REPORT    PATIENT NAME: Zaid Bustillo                  :        1975  MED REC NO:   [id number]                            ROOM:  ACCOUNT NO:   [de-identified]                           ADMIT DATE: 2021  PROVIDER:     Ángel Dutta MD    DATE OF EM2021    REFERRING PROVIDER:  Dr. Chavo Harry. REASON FOR STUDY:  The patient was having numbness in the hands. He has  a history of diabetes mellitus type 1, insulin dependent. Motor nerve conduction velocities and F-wave latencies are normal in all  the nerves tested. The distal motor and sensory latencies are normal in the ulnar nerves,  but delayed in the median nerves, being worse on the right side. On the concentric needle electrode examination, mild denervation changes  are present in the abductor pollicis brevis muscles bilaterally, being  worse on the right side. CLINICAL INTERPRETATION:  EMG studies are showing changes of moderate  bilateral median nerve compression neuropathy at the wrists consistent  with diagnosis of moderate bilateral carpal tunnel syndrome, being worse  and more symptomatic on the right side. The patient is being tried on conservative management. If his symptoms  continue or worsen, he will need decompression of the median nerves. Thank you Dr. Charisse Gonzalez for allowing me to see the patient. Please feel  free to call me if I can be of any further assistance regarding this  patient's evaluation.         Mahogany Tejada MD    D: 2021 16:55:55       T: 2021 16:59:14     [email or web address]  Job#: [id number]     Doc#: [id number]    CC:

## 2021-08-25 ENCOUNTER — VIRTUAL VISIT (OUTPATIENT)
Dept: ENDOCRINOLOGY | Age: 46
End: 2021-08-25
Payer: MEDICARE

## 2021-08-25 DIAGNOSIS — E78.00 HYPERCHOLESTEREMIA: ICD-10-CM

## 2021-08-25 DIAGNOSIS — E10.9 TYPE 1 DIABETES MELLITUS WITHOUT COMPLICATION (HCC): Primary | ICD-10-CM

## 2021-08-25 PROCEDURE — 99442 PR PHYS/QHP TELEPHONE EVALUATION 11-20 MIN: CPT | Performed by: INTERNAL MEDICINE

## 2021-08-25 RX ORDER — SIMVASTATIN 20 MG
20 TABLET ORAL EVERY EVENING
Qty: 30 TABLET | Refills: 3 | Status: SHIPPED | OUTPATIENT
Start: 2021-08-25 | End: 2021-10-12 | Stop reason: SDUPTHER

## 2021-08-25 RX ORDER — LISINOPRIL 10 MG/1
TABLET ORAL
Qty: 90 TABLET | Refills: 3 | Status: SHIPPED | OUTPATIENT
Start: 2021-08-25 | End: 2022-03-01 | Stop reason: SDUPTHER

## 2021-08-25 NOTE — PROGRESS NOTES
2021    TELEHEALTH EVALUATION -- Audio/Visual (During OJBDA-96 public health emergency)    Due to COVID 19 outbreak, patient's office visit was converted to a virtual visit. Patient was contacted and agreed to proceed with a virtual visit via Telephone Visit  The risks and benefits of converting to a virtual visit were discussed in light of the current infectious disease epidemic. Patient also understood that insurance coverage and co-pays are up to their individual insurance plans. HPI: Patient was at home I was at my office    India Mir (:  1975) has requested an audio/video evaluation for the following concern(s):    Follow-up on type 1 diabetes patient is on insulin via insulin pump taking regular insulin through [facility] due to cost has used Humalog in the past uses it beginning the follow-up blood sugars have improved A1c down from 8.4-7.8 denies any hypoglycemia complications include retinopathy has been seeing retinologist for many years requesting refills simvastatin lisinopril no changes in his pump setting otherwise BUN/creatinine have been normal    Results for Jaime Mesa (MRN [id number]) as of 2021 09:43   Ref. Range 2021 10:11   Sodium Latest Ref Range: 135 - 144 mEq/L 137   Potassium Latest Ref Range: 3.4 - 4.9 mEq/L 4.5   Chloride Latest Ref Range: 95 - 107 mEq/L 99   CO2 Latest Ref Range: 20 - 31 mEq/L 29   BUN Latest Ref Range: 6 - 20 mg/dL 14   Creatinine Latest Ref Range: 0.70 - 1.20 mg/dL 0.58 (L)   Anion Gap Latest Ref Range: 9 - 15 mEq/L 9   GFR Non- Latest Ref Range: >60  >60.0   GFR African American Latest Ref Range: >60  >60.0   Glucose Latest Ref Range: 70 - 99 mg/dL 147 (H)   Calcium Latest Ref Range: 8.5 - 9.9 mg/dL 9.4   Hemoglobin A1C Latest Ref Range: 4.8 - 5.9 % 7.8 (H)     Results for Jaime Mesa (MRN [id number]) as of 2021 09:43   Ref.  Range 3/26/2019 10:44 2020 14:30 2020 08:59 2021 09:21 2021 10:11   Hemoglobin A1C Latest Ref Range: 4.8 - 5.9 % 9.1 (H) 8.2 7.4 (H) 8.4 (H) 7.8 (H)     Patient Active Problem List   Diagnosis    Type 1 diabetes mellitus without complication (HCC)    Hypercholesteremia    Diabetic retinopathy (HCC)    Chest pain of uncertain etiology    Dizziness    SOB (shortness of breath)         Review of Systems   Eyes: Positive for visual disturbance. Endocrine: Negative. All other systems reviewed and are negative. Prior to Visit Medications    Medication Sig Taking?  Authorizing Provider   celecoxib (CELEBREX) 100 MG capsule TAKE ONE CAPSULE BY MOUTH TWICE DAILY AS NEEDED  Historical Provider, MD   azelastine (ASTELIN) 0.1 % nasal spray 1 spray by Nasal route 2 times daily Use in each nostril as directed  TARA Simeon CNP   insulin lispro (HUMALOG) 100 UNIT/ML injection vial USE VIA PUMP  UNITS PER DAY please give 6 vials per 30 days  Lot #[id number] exp: 1/23  Ta Lovell MD   lisinopril (PRINIVIL;ZESTRIL) 10 MG tablet Himanshu Bishop MD   simvastatin (ZOCOR) 20 MG tablet TAKE ONE (1) TABLET BY MOUTH EVERY Billie MD Michael   warfarin (COUMADIN) 5 MG tablet Take 2 tablets by mouth daily Take as directed  Shady Rodriguez MD   brimonidine (ALPHAGAN) 0.2 % ophthalmic solution INSTILL ONE DROP INTO EACH EYE TWICE DAILY  Historical Provider, MD   insulin detemir (LEVEMIR) 100 UNIT/ML injection vial INJECT 25 UNITS SUBCUTANEOUSLY NIGHTLY  Patient not taking: Reported on 6/14/2021  Corinne Joaquin MD   gabapentin (NEURONTIN) 600 MG tablet TAKE ONE TABLET BY MOUTH FOUR TIMES A DAY AS NEEDED  Historical Provider, MD   HUMIRA PEN 40 MG/0.8ML injection   Historical Provider, MD   Insulin Pump Accessories MISC by Does not apply route  Historical Provider, MD   traMADol (ULTRAM) 50 MG tablet take one or two tabs three times a day with Tylenol  Historical Provider, MD COLÓN P 0.15 % ophthalmic solution Place 1 drop into both eyes daily. Historical Provider, MD   latanoprost (XALATAN) 0.005 % ophthalmic solution Place 1 drop into both eyes. Historical Provider, MD       Social History     Tobacco Use    Smoking status: Former Smoker     Quit date:      Years since quittin.6    Smokeless tobacco: Never Used   Substance Use Topics    Alcohol use: No    Drug use: No            PHYSICAL EXAMINATION:  [ INSTRUCTIONS:  \"[x]\" Indicates a positive item  \"[]\" Indicates a negative item  -- DELETE ALL ITEMS NOT EXAMINED]  [] Alert  [] Oriented to person/place/time    [] No apparent distress  [] Toxic appearing    [] Face flushed appearing [] Sclera clear  [] Lips are cyanotic      [] Breathing appears normal  [] Appears tachypneic      [] Rash on visible skin    [] Cranial Nerves II-XII grossly intact    [] Motor grossly intact in visible upper extremities    [] Motor grossly intact in visible lower extremities    [] Normal Mood  [] Anxious appearing    [] Depressed appearing  [] Confused appearing      [] Poor short term memory  [] Poor long term memory    [] OTHER:      Due to this being a TeleHealth encounter, evaluation of the following organ systems is limited: Vitals/Constitutional/EENT/Resp/CV/GI//MS/Neuro/Skin/Heme-Lymph-Imm. ASSESSMENT/PLAN:     Diagnosis Orders   1. Type 1 diabetes mellitus without complication ([facility]     2.  Hypercholesteremia  lisinopril (PRINIVIL;ZESTRIL) 10 MG tablet     Continue Humalog/Regular Insulin as per current pump setting patient to follow-up in 6 months with repeat labs A1c goal of 7-7.5 avoid hypoglycemia    Total time spent with patient 15 minutes  Orders Placed This Encounter   Medications    lisinopril (PRINIVIL;ZESTRIL) 10 MG tablet     Sig: TAKE ONE TABLET BY MOUTH EVERY DAY     Dispense:  90 tablet     Refill:  03    simvastatin (ZOCOR) 20 MG tablet     Sig: Take 1 tablet by mouth every evening     Dispense:  30 tablet     Refill:  3    mupirocin (BACTROBAN) 2 % ointment Sig: Apply 3 times daily. Dispense:  22 g     Refill:  0         An  electronic signature was used to authenticate this note. --Ayanna Gatica MD on 8/25/2021 at 9:42 AM        Pursuant to the emergency declaration under the [facility], [address] [place] [address] and the [employer] Act, this Virtual  Visit was conducted, with patient's consent, to reduce the patient's risk of exposure to COVID-19 and provide continuity of care for an established patient. Services were provided through a video synchronous discussion virtually to substitute for in-person clinic visit.

## 2021-10-12 RX ORDER — SIMVASTATIN 20 MG
20 TABLET ORAL EVERY EVENING
Qty: 90 TABLET | Refills: 1 | Status: SHIPPED | OUTPATIENT
Start: 2021-10-12 | End: 2022-03-01 | Stop reason: SDUPTHER

## 2021-11-06 DIAGNOSIS — I63.89 CEREBROVASCULAR ACCIDENT (CVA) DUE TO OTHER MECHANISM (HCC): ICD-10-CM

## 2021-11-06 LAB
INR BLD: 3
PROTHROMBIN TIME: 30.3 SEC (ref 12.3–14.9)

## 2021-11-10 ENCOUNTER — TELEPHONE (OUTPATIENT)
Dept: PRIMARY CARE CLINIC | Age: 46
End: 2021-11-10

## 2021-11-10 NOTE — TELEPHONE ENCOUNTER
Patient had his Pro time done on 11/6 and he's asking if you would take a look at the results for him.   Can leave message on his vm   Please advise

## 2021-11-15 NOTE — TELEPHONE ENCOUNTER
Contains abnormal data Protime-INR  Order: [id number]   Status: Final result     Visible to patient: Yes (not seen)     Next appt: None     Dx: Cerebrovascular accident (CVA) due to. ..     0 Result Notes     Ref Range & Units 11/6/21 1002   Protime 12.3 - 14.9 sec 30.3 High     INR  3.0    Resulting Agency  [address] [facility]              Specimen Collected: 11/06/21 10:02 Last Resulted: 11/06/21 11:41        Lab Flowsheet     Order Details     View Encounter     Lab and Collection Details     Routing     Result History             Result Care Coordination      Patient Communication    Released Not seen Back to Top             Result Information    Flag: Abnormal Abnormal   Status: Final result (Collected: 11/6/2021 10:02) Provider Status

## 2021-12-13 DIAGNOSIS — I63.89 CEREBROVASCULAR ACCIDENT (CVA) DUE TO OTHER MECHANISM (HCC): ICD-10-CM

## 2021-12-13 RX ORDER — WARFARIN SODIUM 5 MG/1
10 TABLET ORAL DAILY
Qty: 180 TABLET | Refills: 1 | Status: SHIPPED | OUTPATIENT
Start: 2021-12-13 | End: 2022-04-26 | Stop reason: SDUPTHER

## 2021-12-17 DIAGNOSIS — I63.89 CEREBROVASCULAR ACCIDENT (CVA) DUE TO OTHER MECHANISM (HCC): ICD-10-CM

## 2021-12-17 LAB
INR BLD: 2.8
PROTHROMBIN TIME: 28.4 SEC (ref 12.3–14.9)

## 2022-01-22 DIAGNOSIS — I63.89 CEREBROVASCULAR ACCIDENT (CVA) DUE TO OTHER MECHANISM (HCC): ICD-10-CM

## 2022-01-22 LAB
INR BLD: 2.3
PROTHROMBIN TIME: 25.2 SEC (ref 12.3–14.9)

## 2022-01-29 ENCOUNTER — OFFICE VISIT (OUTPATIENT)
Dept: FAMILY MEDICINE CLINIC | Age: 47
End: 2022-01-29
Payer: MEDICARE

## 2022-01-29 VITALS
TEMPERATURE: 95 F | HEART RATE: 96 BPM | OXYGEN SATURATION: 96 % | WEIGHT: 180 LBS | HEIGHT: 69 IN | BODY MASS INDEX: 26.66 KG/M2 | DIASTOLIC BLOOD PRESSURE: 80 MMHG | SYSTOLIC BLOOD PRESSURE: 128 MMHG

## 2022-01-29 DIAGNOSIS — J02.0 STREP THROAT: Primary | ICD-10-CM

## 2022-01-29 LAB — S PYO AG THROAT QL: NORMAL

## 2022-01-29 PROCEDURE — 99213 OFFICE O/P EST LOW 20 MIN: CPT | Performed by: PHYSICIAN ASSISTANT

## 2022-01-29 PROCEDURE — 87880 STREP A ASSAY W/OPTIC: CPT | Performed by: PHYSICIAN ASSISTANT

## 2022-01-29 RX ORDER — AMOXICILLIN 500 MG/1
500 CAPSULE ORAL 2 TIMES DAILY
Qty: 20 CAPSULE | Refills: 0 | Status: SHIPPED | OUTPATIENT
Start: 2022-01-29 | End: 2022-02-08

## 2022-01-29 ASSESSMENT — ENCOUNTER SYMPTOMS
NAUSEA: 0
SORE THROAT: 1
SINUS PRESSURE: 0
CHANGE IN BOWEL HABIT: 0
SINUS PAIN: 0
SHORTNESS OF BREATH: 0
ABDOMINAL PAIN: 1
VISUAL CHANGE: 0
COUGH: 0
DIARRHEA: 1
SWOLLEN GLANDS: 1
BACK PAIN: 0
RHINORRHEA: 0
VOMITING: 0
CHEST TIGHTNESS: 0

## 2022-01-29 ASSESSMENT — VISUAL ACUITY: OU: 1

## 2022-01-29 NOTE — PROGRESS NOTES
[address] Encounter  CHIEF COMPLAINT       Chief Complaint   Patient presents with    Pharyngitis     PATIENT STATES THAT HE CAN HARDLY TALK, X4DAYS, TYLENOL    Otalgia    Fatigue    Chest Congestion       HISTORY OF PRESENT ILLNESS   Raquel Felipe is a 51 y.o. male who presents with:  Pharyngitis  This is a new problem. Episode onset: x4 day. The problem occurs constantly. The problem has been unchanged. Associated symptoms include abdominal pain, congestion, headaches, a sore throat and swollen glands. Pertinent negatives include no anorexia, arthralgias, change in bowel habit, chest pain, chills, coughing, diaphoresis, fatigue, fever, joint swelling, myalgias, nausea, neck pain, numbness, rash, urinary symptoms, vertigo, visual change, vomiting or weakness. The symptoms are aggravated by eating, drinking and swallowing. He has tried acetaminophen for the symptoms. The treatment provided mild relief. Otalgia   There is pain in the right ear. This is a new problem. The current episode started in the past 7 days. The problem occurs constantly. There has been no fever. The pain is mild. Associated symptoms include abdominal pain, diarrhea, headaches and a sore throat. Pertinent negatives include no coughing, ear discharge, hearing loss, neck pain, rash, rhinorrhea or vomiting. He has tried acetaminophen for the symptoms. The treatment provided mild relief. REVIEW OF SYSTEMS     Review of Systems   Constitutional: Negative for activity change, appetite change, chills, diaphoresis, fatigue and fever. HENT: Positive for congestion, ear pain and sore throat. Negative for drooling, ear discharge, hearing loss, rhinorrhea, sinus pressure and sinus pain. Eyes: Negative for visual disturbance. Respiratory: Negative for cough, chest tightness and shortness of breath. Cardiovascular: Negative for chest pain.    Gastrointestinal: Positive for abdominal pain and diarrhea. Negative for anorexia, change in bowel habit, nausea and vomiting. Endocrine: Negative for cold intolerance. Genitourinary: Negative for dysuria, flank pain, frequency and hematuria. Musculoskeletal: Negative for arthralgias, back pain, joint swelling, myalgias and neck pain. Skin: Negative for rash. Allergic/Immunologic: Negative for food allergies. Neurological: Positive for headaches. Negative for vertigo, weakness, light-headedness and numbness. Hematological: Does not bruise/bleed easily. PAST MEDICAL HISTORY         Diagnosis Date    Chest pain of uncertain etiology 1/13/6077    Dizziness 9/28/2017    DVT of leg (deep venous thrombosis) (HCC)     x2    Hyperlipidemia     Hypertension     Rheumatoid arthritis ([facility]     dr Federico Meyer ht.  Smoker     quit 2008    SOB (shortness of breath) 9/28/2017    Stroke ([facility]     bleed    Type 1 diabetes mellitus, uncontrolled ([facility]      SURGICAL HISTORY     Patient  has no past surgical history on file. CURRENT MEDICATIONS       Previous Medications    ALPHAGAN P 0.15 % OPHTHALMIC SOLUTION    Place 1 drop into both eyes daily. AZELASTINE (ASTELIN) 0.1 % NASAL SPRAY    1 spray by Nasal route 2 times daily Use in each nostril as directed    BRIMONIDINE (ALPHAGAN) 0.2 % OPHTHALMIC SOLUTION    INSTILL ONE DROP INTO EACH EYE TWICE DAILY    CELECOXIB (CELEBREX) 100 MG CAPSULE    TAKE ONE CAPSULE BY MOUTH TWICE DAILY AS NEEDED    GABAPENTIN (NEURONTIN) 600 MG TABLET    TAKE ONE TABLET BY MOUTH FOUR TIMES A DAY AS NEEDED    INSULIN LISPRO (HUMALOG) 100 UNIT/ML INJECTION VIAL    USE VIA PUMP  UNITS PER DAY please give 6 vials per 30 days  Lot #[id number] exp: 1/23    INSULIN PUMP ACCESSORIES MISC    by Does not apply route    LATANOPROST (XALATAN) 0.005 % OPHTHALMIC SOLUTION    Place 1 drop into both eyes.     LISINOPRIL (PRINIVIL;ZESTRIL) 10 MG TABLET    TAKE ONE TABLET BY MOUTH EVERY DAY    SIMVASTATIN (ZOCOR) 20 MG TABLET TAKE ONE (1) TABLET BY MOUTH EVERY EVENING    SIMVASTATIN (ZOCOR) 20 MG TABLET    Take 1 tablet by mouth every evening    TRAMADOL (ULTRAM) 50 MG TABLET    take one or two tabs three times a day with Tylenol    WARFARIN (COUMADIN) 5 MG TABLET    Take 2 tablets by mouth daily Take as directed     ALLERGIES     Patient is has No Known Allergies. FAMILY HISTORY     Patient'sfamily history includes Clotting Disorder in his mother. SOCIAL HISTORY     Patient  reports that he quit smoking about 15 years ago. He has never used smokeless tobacco. He reports that he does not drink alcohol and does not use drugs. PHYSICAL EXAM     VITALS  BP: 128/80, Temp: 95 °F (35 °C), Pulse: 96,  , SpO2: 96 %  Physical Exam  Vitals and nursing note reviewed. Constitutional:       General: He is awake. He is not in acute distress. Appearance: Normal appearance. He is well-developed. He is not ill-appearing, toxic-appearing or diaphoretic. HENT:      Head: Normocephalic and atraumatic. Right Ear: Hearing and external ear normal. Tympanic membrane is erythematous and bulging. Left Ear: Hearing, tympanic membrane, ear canal and external ear normal.      Nose: Nose normal.      Right Sinus: No maxillary sinus tenderness or frontal sinus tenderness. Left Sinus: No maxillary sinus tenderness or frontal sinus tenderness. Mouth/Throat:      Lips: Pink. Mouth: Mucous membranes are moist.      Pharynx: Uvula midline. Pharyngeal swelling and posterior oropharyngeal erythema present. Tonsils: No tonsillar exudate or tonsillar abscesses. 2+ on the right. 2+ on the left. Eyes:      General: Lids are normal. Vision grossly intact. Gaze aligned appropriately. Conjunctiva/sclera: Conjunctivae normal.   Cardiovascular:      Rate and Rhythm: Normal rate and regular rhythm. Pulses: Normal pulses.       Heart sounds: Normal heart sounds, S1 normal and S2 normal.   Pulmonary:      Effort: Pulmonary effort is normal.      Breath sounds: Normal breath sounds and air entry. Musculoskeletal:      Cervical back: Normal range of motion. Lymphadenopathy:      Head:      Right side of head: Tonsillar adenopathy present. Left side of head: Tonsillar adenopathy present. Skin:     General: Skin is warm. Capillary Refill: Capillary refill takes less than 2 seconds. Neurological:      Mental Status: He is alert and oriented to person, place, and time. Gait: Gait is intact. Psychiatric:         Attention and Perception: Attention normal.         Mood and Affect: Mood normal.         Speech: Speech normal.         Behavior: Behavior normal. Behavior is cooperative. READY CARE COURSE   Labs:  No results found for this visit on 01/29/22. IMAGING:  No orders to display     Scheduled Meds:  Continuous Infusions:  PRN Meds:. PROCEDURES:  FINAL IMPRESSION      1. Strep throat      DISPOSITION/PLAN   1. Patient was started on amoxicillin 500 mg BID for x10 days. POCT strep negative, but will send for culture. Concern for possible right ear infection. Went over possible s/e of the medications. Patient would like to proceed with therapy. Discussed signs and symptoms which require immediate follow-up in ED/call to 911. Patient verbalized understanding. PATIENT REFERRED TO:  Return if symptoms worsen or fail to improve. DISCHARGE MEDICATIONS:  New Prescriptions    AMOXICILLIN (AMOXIL) 500 MG CAPSULE    Take 1 capsule by mouth 2 times daily for 10 days     Cannot display discharge medications since this is not an admission.        Hosea Fleming

## 2022-01-30 DIAGNOSIS — J02.0 STREP THROAT: ICD-10-CM

## 2022-02-01 LAB — THROAT CULTURE: NORMAL

## 2022-02-26 DIAGNOSIS — I63.89 CEREBROVASCULAR ACCIDENT (CVA) DUE TO OTHER MECHANISM (HCC): ICD-10-CM

## 2022-02-26 LAB
INR BLD: 2.3
PROTHROMBIN TIME: 24.7 SEC (ref 12.3–14.9)

## 2022-03-01 ENCOUNTER — TELEMEDICINE (OUTPATIENT)
Dept: ENDOCRINOLOGY | Age: 47
End: 2022-03-01
Payer: MEDICARE

## 2022-03-01 DIAGNOSIS — E10.9 TYPE 1 DIABETES MELLITUS WITHOUT COMPLICATION (HCC): Primary | ICD-10-CM

## 2022-03-01 DIAGNOSIS — E78.00 HYPERCHOLESTEREMIA: ICD-10-CM

## 2022-03-01 PROCEDURE — 99442 PR PHYS/QHP TELEPHONE EVALUATION 11-20 MIN: CPT | Performed by: INTERNAL MEDICINE

## 2022-03-01 RX ORDER — SIMVASTATIN 20 MG
20 TABLET ORAL EVERY EVENING
Qty: 90 TABLET | Refills: 1 | Status: SHIPPED | OUTPATIENT
Start: 2022-03-01 | End: 2022-08-08 | Stop reason: SDUPTHER

## 2022-03-01 RX ORDER — LISINOPRIL 10 MG/1
TABLET ORAL
Qty: 90 TABLET | Refills: 3 | Status: SHIPPED | OUTPATIENT
Start: 2022-03-01

## 2022-03-01 NOTE — PROGRESS NOTES
3/1/2022    TELEHEALTH EVALUATION -- Audio/Visual (During [address] emergency)    Due to [place] 19 outbreak, patient's office visit was converted to a virtual visit. Patient was contacted and agreed to proceed with a virtual visit via Telephone Visit  The risks and benefits of converting to a virtual visit were discussed in light of the current infectious disease epidemic. Patient also understood that insurance coverage and co-pays are up to their individual insurance plans. HPI: Follow-up on type 1 diabetes patient is on Humalog via pump this is a virtual visit overall blood sugars have been higher in the upper 100-200 range denies any hypoglycemia no recent labs to review patient also requesting another medication to help lower his sugar is concerned about cost complications diabetes include retinopathy    Last hemoglobin A1c was 7.8    Lab Results   Component Value Date     2021    K 4.5 2021    CL 99 2021    CO2 29 2021    BUN 14 2021    CREATININE 0.58 (L) 2021    GLUCOSE 147 (H) 2021    CALCIUM 9.4 2021    PROT 6.4 2018    LABALBU 3.9 2018    BILITOT <0.2 2018    ALKPHOS 32 (L) 2018    AST 14 2018    ALT 13 2018    LABGLOM >60.0 2021    GFRAA >60.0 2021    GLOB 2.7 2017       Lab Results   Component Value Date    LABA1C 7.8 (H) 2021         Alberta Allen (:  1975) has requested an audio/video evaluation for the following concern(s):    Patient Active Problem List   Diagnosis    Type 1 diabetes mellitus without complication ([address]    Hypercholesteremia    Diabetic retinopathy ([address]    Chest pain of uncertain etiology    Dizziness    SOB (shortness of breath)         Review of Systems   Eyes: Positive for visual disturbance. Cardiovascular: Negative. Endocrine: Negative. All other systems reviewed and are negative.       Prior to Visit Medications Medication Sig Taking? Authorizing Provider   insulin lispro (HUMALOG) 100 UNIT/ML injection vial USE VIA PUMP  UNITS PER DAY please give 6 vials per 30 days  Lot #[id number] exp: 1/23  Godfrey Orourke MD   warfarin (COUMADIN) 5 MG tablet Take 2 tablets by mouth daily Take as directed  Jose Ohara MD   simvastatin (ZOCOR) 20 MG tablet Take 1 tablet by mouth every evening  Godfrey Orourke MD   lisinopril (PRINIVIL;ZESTRIL) 10 MG tablet TAKE ONE TABLET BY MOUTH EVERY DAY  Godfrey Orourke MD   celecoxib (CELEBREX) 100 MG capsule TAKE ONE CAPSULE BY MOUTH TWICE DAILY AS NEEDED  Historical Provider, MD   azelastine (ASTELIN) 0.1 % nasal spray 1 spray by Nasal route 2 times daily Use in each nostril as directed  TARA Sears CNP   simvastatin (ZOCOR) 20 MG tablet TAKE ONE (1) TABLET BY Kenyon Olmos MD   brimonidine (ALPHAGAN) 0.2 % ophthalmic solution INSTILL ONE DROP INTO EACH EYE TWICE DAILY  Historical Provider, MD   gabapentin (NEURONTIN) 600 MG tablet TAKE ONE TABLET BY MOUTH FOUR TIMES A DAY AS NEEDED  Historical Provider, MD   Insulin Pump Accessories MISC by Does not apply route  Historical Provider, MD   traMADol (ULTRAM) 50 MG tablet take one or two tabs three times a day with Tylenol  Historical Provider, MD   ALPHAGAN P 0.15 % ophthalmic solution Place 1 drop into both eyes daily. Historical Provider, MD   latanoprost (XALATAN) 0.005 % ophthalmic solution Place 1 drop into both eyes. Historical Provider, MD       Social History     Tobacco Use    Smoking status: Former Smoker     Quit date: 2007     Years since quitting: 15.1    Smokeless tobacco: Never Used   Substance Use Topics    Alcohol use: No    Drug use:  No            PHYSICAL EXAMINATION:  [ INSTRUCTIONS:  \"[x]\" Indicates a positive item  \"[]\" Indicates a negative item  -- DELETE ALL ITEMS NOT EXAMINED]  [] Alert  [] Oriented to person/place/time    [] No apparent distress  [] Toxic appearing    [] Face flushed appearing [] Sclera clear  [] Lips are cyanotic      [] Breathing appears normal  [] Appears tachypneic      [] Rash on visible skin    [] Cranial Nerves II-XII grossly intact    [] Motor grossly intact in visible upper extremities    [] Motor grossly intact in visible lower extremities    [] Normal Mood  [] Anxious appearing    [] Depressed appearing  [] Confused appearing      [] Poor short term memory  [] Poor long term memory    [] OTHER:      Due to this being a TeleHealth encounter, evaluation of the following organ systems is limited: Vitals/Constitutional/EENT/Resp/CV/GI//MS/Neuro/Skin/Heme-Lymph-Imm. ASSESSMENT/PLAN:     Diagnosis Orders   1. Type 1 diabetes mellitus without complication (HCC)  Basic Metabolic Panel    Lipid Panel    Hemoglobin A1C    Microalbumin / Creatinine Urine Ratio   2.  Hypercholesteremia  lisinopril (PRINIVIL;ZESTRIL) 10 MG tablet       Orders Placed This Encounter   Procedures    Basic Metabolic Panel     Standing Status:   Future     Standing Expiration Date:   3/1/2023    Lipid Panel     Standing Status:   Future     Standing Expiration Date:   3/1/2023     Order Specific Question:   Is Patient Fasting?/# of Hours     Answer:   y    Hemoglobin A1C     Standing Status:   Future     Standing Expiration Date:   3/1/2023    Microalbumin / Creatinine Urine Ratio     Standing Status:   Future     Standing Expiration Date:   3/1/2023     Continue Humalog via pump as per current setting continue simvastatin continue lisinopril follow-up in 3 to 6 months time A1c goal of 7 or lower  Orders Placed This Encounter   Medications    lisinopril (PRINIVIL;ZESTRIL) 10 MG tablet     Sig: TAKE ONE TABLET BY MOUTH EVERY DAY     Dispense:  90 tablet     Refill:  03    simvastatin (ZOCOR) 20 MG tablet     Sig: Take 1 tablet by mouth every evening     Dispense:  90 tablet     Refill:  1    dapagliflozin (FARXIGA) 10 MG tablet     Sig: Take 1 tablet by mouth every morning Dispense:  90 tablet     Refill:  1     Total time spent with patient was 15 minutes      An  electronic signature was used to authenticate this note. --Andre Ann MD on 3/1/2022 at 3:09 PM        Pursuant to the emergency declaration under the [address] [place] [address] waiver authority and the [employer] and [employer] Act, this Virtual  Visit was conducted, with patient's consent, to reduce the patient's risk of exposure to COVID-19 and provide continuity of care for an established patient. Services were provided through a video synchronous discussion virtually to substitute for in-person clinic visit.

## 2022-04-02 DIAGNOSIS — I63.89 CEREBROVASCULAR ACCIDENT (CVA) DUE TO OTHER MECHANISM (HCC): ICD-10-CM

## 2022-04-02 DIAGNOSIS — E10.9 TYPE 1 DIABETES MELLITUS WITHOUT COMPLICATION (HCC): ICD-10-CM

## 2022-04-02 LAB
ANION GAP SERPL CALCULATED.3IONS-SCNC: 13 MEQ/L (ref 9–15)
BUN BLDV-MCNC: 17 MG/DL (ref 6–20)
CALCIUM SERPL-MCNC: 9.3 MG/DL (ref 8.5–9.9)
CHLORIDE BLD-SCNC: 101 MEQ/L (ref 95–107)
CHOLESTEROL, TOTAL: 167 MG/DL (ref 0–199)
CO2: 25 MEQ/L (ref 20–31)
CREAT SERPL-MCNC: 0.59 MG/DL (ref 0.7–1.2)
CREATININE URINE: 58 MG/DL
GFR AFRICAN AMERICAN: >60
GFR NON-AFRICAN AMERICAN: >60
GLUCOSE BLD-MCNC: 132 MG/DL (ref 70–99)
HBA1C MFR BLD: 7.5 % (ref 4.8–5.9)
HDLC SERPL-MCNC: 42 MG/DL (ref 40–59)
INR BLD: 1.8
LDL CHOLESTEROL CALCULATED: 85 MG/DL (ref 0–129)
MICROALBUMIN UR-MCNC: <1.2 MG/DL
MICROALBUMIN/CREAT UR-RTO: NORMAL MG/G (ref 0–30)
POTASSIUM SERPL-SCNC: 4.1 MEQ/L (ref 3.4–4.9)
PROTHROMBIN TIME: 20.9 SEC (ref 12.3–14.9)
SODIUM BLD-SCNC: 139 MEQ/L (ref 135–144)
TRIGL SERPL-MCNC: 199 MG/DL (ref 0–150)

## 2022-04-18 RX ORDER — SIMVASTATIN 20 MG
TABLET ORAL
Qty: 90 TABLET | Refills: 0 | OUTPATIENT
Start: 2022-04-18

## 2022-04-26 ENCOUNTER — TELEMEDICINE (OUTPATIENT)
Dept: PRIMARY CARE CLINIC | Age: 47
End: 2022-04-26
Payer: MEDICARE

## 2022-04-26 DIAGNOSIS — J30.9 ALLERGIC RHINITIS, UNSPECIFIED SEASONALITY, UNSPECIFIED TRIGGER: ICD-10-CM

## 2022-04-26 DIAGNOSIS — I63.89 CEREBROVASCULAR ACCIDENT (CVA) DUE TO OTHER MECHANISM (HCC): Primary | ICD-10-CM

## 2022-04-26 DIAGNOSIS — Z00.00 PREVENTATIVE HEALTH CARE: ICD-10-CM

## 2022-04-26 DIAGNOSIS — H92.02 ACUTE OTALGIA, LEFT: ICD-10-CM

## 2022-04-26 PROCEDURE — 99213 OFFICE O/P EST LOW 20 MIN: CPT | Performed by: INTERNAL MEDICINE

## 2022-04-26 RX ORDER — WARFARIN SODIUM 5 MG/1
10 TABLET ORAL DAILY
Qty: 180 TABLET | Refills: 1 | Status: SHIPPED | OUTPATIENT
Start: 2022-04-26

## 2022-04-26 RX ORDER — AZELASTINE 1 MG/ML
1 SPRAY, METERED NASAL 2 TIMES DAILY
Qty: 1 EACH | Refills: 5 | Status: SHIPPED | OUTPATIENT
Start: 2022-04-26

## 2022-04-26 ASSESSMENT — PATIENT HEALTH QUESTIONNAIRE - PHQ9
2. FEELING DOWN, DEPRESSED OR HOPELESS: 0
4. FEELING TIRED OR HAVING LITTLE ENERGY: 0
1. LITTLE INTEREST OR PLEASURE IN DOING THINGS: 0
8. MOVING OR SPEAKING SO SLOWLY THAT OTHER PEOPLE COULD HAVE NOTICED. OR THE OPPOSITE, BEING SO FIGETY OR RESTLESS THAT YOU HAVE BEEN MOVING AROUND A LOT MORE THAN USUAL: 0
SUM OF ALL RESPONSES TO PHQ9 QUESTIONS 1 & 2: 0
SUM OF ALL RESPONSES TO PHQ QUESTIONS 1-9: 0
9. THOUGHTS THAT YOU WOULD BE BETTER OFF DEAD, OR OF HURTING YOURSELF: 0
6. FEELING BAD ABOUT YOURSELF - OR THAT YOU ARE A FAILURE OR HAVE LET YOURSELF OR YOUR FAMILY DOWN: 0
SUM OF ALL RESPONSES TO PHQ QUESTIONS 1-9: 0
SUM OF ALL RESPONSES TO PHQ QUESTIONS 1-9: 0
7. TROUBLE CONCENTRATING ON THINGS, SUCH AS READING THE NEWSPAPER OR WATCHING TELEVISION: 0
10. IF YOU CHECKED OFF ANY PROBLEMS, HOW DIFFICULT HAVE THESE PROBLEMS MADE IT FOR YOU TO DO YOUR WORK, TAKE CARE OF THINGS AT HOME, OR GET ALONG WITH OTHER PEOPLE: 0
3. TROUBLE FALLING OR STAYING ASLEEP: 0
SUM OF ALL RESPONSES TO PHQ QUESTIONS 1-9: 0
5. POOR APPETITE OR OVEREATING: 0

## 2022-04-26 NOTE — PROGRESS NOTES
Doxy  2022    TELEHEALTH EVALUATION -- Audio/Visual (During [facility]-58 public health emergency)    Due to [place] 19 outbreak, patient's office visit was converted to a virtual visit. Patient was contacted and agreed to proceed with a virtual visit via [email or web address] me  The risks and benefits of converting to a virtual visit were discussed in light of the current infectious disease epidemic. Patient also understood that insurance coverage and co-pays are up to their individual insurance plans. HPI:    Rey Bradley (: 1975) has requested an audio/video evaluation for the following concern(s):    Ear Fullness   There is pain in the left ear. This is a recurrent problem. The current episode started 1 to 4 weeks ago. The problem has been waxing and waning. Associated symptoms include rhinorrhea. Pertinent negatives include no abdominal pain, rash or vomiting. Coronary Artery Disease  Presents for follow-up visit. Pertinent negatives include no chest pain, chest pressure, dizziness, palpitations or shortness of breath. The symptoms have been stable. Review of Systems   Constitutional: Negative for fatigue and fever. HENT: Positive for postnasal drip and rhinorrhea. Negative for trouble swallowing and voice change. Eyes: Negative for photophobia and visual disturbance. Respiratory: Negative for choking and shortness of breath. Cardiovascular: Negative for chest pain and palpitations. Gastrointestinal: Negative for abdominal pain, nausea and vomiting. Genitourinary: Negative for decreased urine volume, testicular pain and urgency. Skin: Negative for rash. Neurological: Negative for dizziness, tremors and syncope. Hematological: Does not bruise/bleed easily. Psychiatric/Behavioral: Negative for suicidal ideas. Prior to Visit Medications    Medication Sig Taking?  Authorizing Provider   warfarin (COUMADIN) 5 MG tablet Take 2 tablets by mouth daily Take as directed Yes [facility] Roldan Begum MD   azelastine (ASTELIN) 0.1 % nasal spray 1 spray by Nasal route 2 times daily Use in each nostril as directed Yes Shady Rodriguez MD   lisinopril (PRINIVIL;ZESTRIL) 10 MG tablet TAKE ONE TABLET BY MOUTH EVERY DAY Yes Ta Lovell MD   simvastatin (ZOCOR) 20 MG tablet Take 1 tablet by mouth every evening Yes Ta Lovell MD   dapagliflozin (FARXIGA) 10 MG tablet Take 1 tablet by mouth every morning Yes Ta Lovell MD   insulin lispro (HUMALOG) 100 UNIT/ML injection vial USE VIA PUMP  UNITS PER DAY please give 6 vials per 30 days  Lot #[id number] exp: 1/23 Yes Ta Lovell MD   celecoxib (CELEBREX) 100 MG capsule TAKE ONE CAPSULE BY MOUTH TWICE DAILY AS NEEDED Yes Historical Provider, MD   brimonidine (ALPHAGAN) 0.2 % ophthalmic solution INSTILL ONE DROP INTO EACH EYE TWICE DAILY Yes Historical Provider, MD   gabapentin (NEURONTIN) 600 MG tablet TAKE ONE TABLET BY MOUTH FOUR TIMES A DAY AS NEEDED Yes Historical Provider, MD   Insulin Pump Accessories MISC by Does not apply route Yes Historical Provider, MD   traMADol (ULTRAM) 50 MG tablet take one or two tabs three times a day with Tylenol Yes Historical Provider, MD   ALPHAGAN P 0.15 % ophthalmic solution Place 1 drop into both eyes daily. Yes Historical Provider, MD   latanoprost (XALATAN) 0.005 % ophthalmic solution Place 1 drop into both eyes.  Yes Historical Provider, MD   simvastatin (ZOCOR) 20 MG tablet TAKE ONE (1) TABLET BY MOUTH EVERY EVENING  Patient not taking: Reported on 4/26/2022  Ta Lovell MD       Social History     Tobacco Use    Smoking status: Former Smoker     Quit date: 2007     Years since quitting: 15.3    Smokeless tobacco: Never Used   Substance Use Topics    Alcohol use: No    Drug use: No        No Known Allergies    PHYSICAL EXAMINATION:  [ INSTRUCTIONS:  \"[x]\" Indicates a positive item  \"[]\" Indicates a negative item  -- DELETE ALL ITEMS NOT EXAMINED]  [] Alert  [] Oriented to person/place/time    [] No apparent distress [] Toxic appearing    [] Face flushed appearing [] Sclera clear  [] Lips are cyanotic      [] Breathing appears normal  [] Appears tachypneic      [] Rash on visible skin    [] Cranial Nerves II-XII grossly intact    [] Motor grossly intact in visible upper extremities    [] Motor grossly intact in visible lower extremities    [] Normal Mood  [] Anxious appearing    [] Depressed appearing  [] Confused appearing      [] Poor short term memory  [] Poor long term memory    [] OTHER:      Due to this being a TeleHealth encounter, evaluation of the following organ systems is limited: Vitals/Constitutional/EENT/Resp/CV/GI//MS/Neuro/Skin/Heme-Lymph-Imm. ASSESSMENT/PLAN:  1. Cerebrovascular accident (CVA) due to other mechanism ([facility] [address]    - warfarin (COUMADIN) 5 MG tablet; Take 2 tablets by mouth daily Take as directed  Dispense: 180 tablet; Refill: 1  - Protime-INR; Standing    2. Allergic rhinitis, unspecified seasonality, unspecified trigger  - azelastine (ASTELIN) 0.1 % nasal spray; 1 spray by Nasal route 2 times daily Use in each nostril as directed  Dispense: 1 each; Refill: 5    3. Acute otalgia, left    - azelastine (ASTELIN) 0.1 % nasal spray; 1 spray by Nasal route 2 times daily Use in each nostril as directed  Dispense: 1 each; Refill: 5    4. Preventative health care    - Fecal DNA Colorectal cancer screening (Cologuard)      Return in about 6 months (around 10/26/2022), or if symptoms worsen or fail to improve. An  electronic signature was used to authenticate this note. --Margoth Hernandez MD on 4/28/2022 at 10:43 PM        Pursuant to the emergency declaration under the [facility] waiver authority and the [employer] and [employer] Act, this Virtual  Visit was conducted, with patient's consent, to reduce the patient's risk of exposure to COVID-19 and provide continuity of care for an established patient.     Services were provided through a video synchronous discussion virtually to substitute for in-person clinic visit.

## 2022-04-28 ENCOUNTER — TELEPHONE (OUTPATIENT)
Dept: PRIMARY CARE CLINIC | Age: 47
End: 2022-04-28

## 2022-04-28 ASSESSMENT — ENCOUNTER SYMPTOMS
NAUSEA: 0
CHOKING: 0
VOICE CHANGE: 0
VOMITING: 0
PHOTOPHOBIA: 0
SHORTNESS OF BREATH: 0
RHINORRHEA: 1
ABDOMINAL PAIN: 0
TROUBLE SWALLOWING: 0

## 2022-04-28 NOTE — TELEPHONE ENCOUNTER
Pt currently has Medicare for insurance and because of his age he does not qualify for cologuard at this time.  Order canceled

## 2022-05-20 DIAGNOSIS — E10.9 TYPE 1 DIABETES MELLITUS WITHOUT COMPLICATION (HCC): Primary | ICD-10-CM

## 2022-05-20 RX ORDER — INSULIN LISPRO 100 [IU]/ML
INJECTION, SOLUTION INTRAVENOUS; SUBCUTANEOUS
Qty: 60 ML | Refills: 3 | Status: SHIPPED | OUTPATIENT
Start: 2022-05-20

## 2022-05-20 NOTE — TELEPHONE ENCOUNTER
Pharmacy requesting medication refill. Please approve or deny this request.    Rx requested:  Requested Prescriptions     Pending Prescriptions Disp Refills    insulin lispro (HUMALOG) 100 UNIT/ML SOLN injection vial [Pharmacy Med Name: Insulin Lispro 100 UNIT/ML Subcutaneous Solution] 60 mL 3     Sig: INJECT SUBCUTANEOUSLY PER DAY VIA PUMP. MAX  UNITS PER DAY         Last Office Visit:   3/1/2022      Next Visit Date:  No future appointments.

## 2022-05-31 DIAGNOSIS — I63.89 CEREBROVASCULAR ACCIDENT (CVA) DUE TO OTHER MECHANISM (HCC): ICD-10-CM

## 2022-05-31 LAB
INR BLD: 1.9
PROTHROMBIN TIME: 21.2 SEC (ref 12.3–14.9)

## 2022-06-02 ENCOUNTER — HOSPITAL ENCOUNTER (EMERGENCY)
Age: 47
Discharge: HOME | End: 2022-06-02
Payer: MEDICARE

## 2022-06-02 VITALS
OXYGEN SATURATION: 94 % | HEART RATE: 95 BPM | SYSTOLIC BLOOD PRESSURE: 138 MMHG | DIASTOLIC BLOOD PRESSURE: 98 MMHG | TEMPERATURE: 98.6 F | RESPIRATION RATE: 18 BRPM | BODY MASS INDEX: 27.32 KG/M2 | WEIGHT: 185 LBS

## 2022-06-02 DIAGNOSIS — T50.901A ACCIDENTAL OVERDOSE, INITIAL ENCOUNTER: Primary | ICD-10-CM

## 2022-06-02 LAB
ACETAMINOPHEN LEVEL: <5 UG/ML (ref 10–30)
ALBUMIN SERPL-MCNC: 4.4 G/DL (ref 3.5–4.6)
ALP BLD-CCNC: 67 U/L (ref 35–104)
ALT SERPL-CCNC: 25 U/L (ref 0–41)
AMPHETAMINE SCREEN, URINE: NORMAL
ANION GAP SERPL CALCULATED.3IONS-SCNC: 11 MEQ/L (ref 9–15)
AST SERPL-CCNC: 19 U/L (ref 0–40)
BARBITURATE SCREEN URINE: NORMAL
BASOPHILS ABSOLUTE: 0 K/UL (ref 0–0.2)
BASOPHILS RELATIVE PERCENT: 0.5 %
BENZODIAZEPINE SCREEN, URINE: NORMAL
BILIRUB SERPL-MCNC: <0.2 MG/DL (ref 0.2–0.7)
BUN BLDV-MCNC: 16 MG/DL (ref 6–20)
CALCIUM SERPL-MCNC: 8.8 MG/DL (ref 8.5–9.9)
CANNABINOID SCREEN URINE: NORMAL
CHLORIDE BLD-SCNC: 102 MEQ/L (ref 95–107)
CO2: 26 MEQ/L (ref 20–31)
COCAINE METABOLITE SCREEN URINE: NORMAL
CREAT SERPL-MCNC: 0.64 MG/DL (ref 0.7–1.2)
EKG ATRIAL RATE: 101 BPM
EKG P AXIS: 50 DEGREES
EKG P-R INTERVAL: 188 MS
EKG Q-T INTERVAL: 354 MS
EKG QRS DURATION: 90 MS
EKG QTC CALCULATION (BAZETT): 459 MS
EKG R AXIS: 73 DEGREES
EKG T AXIS: 64 DEGREES
EKG VENTRICULAR RATE: 101 BPM
EOSINOPHILS ABSOLUTE: 0.3 K/UL (ref 0–0.7)
EOSINOPHILS RELATIVE PERCENT: 4.7 %
ETHANOL PERCENT: NORMAL G/DL
ETHANOL: <10 MG/DL (ref 0–0.08)
GFR AFRICAN AMERICAN: >60
GFR NON-AFRICAN AMERICAN: >60
GLOBULIN: 2.6 G/DL (ref 2.3–3.5)
GLUCOSE BLD-MCNC: 145 MG/DL (ref 70–99)
HCT VFR BLD CALC: 48 % (ref 42–52)
HEMOGLOBIN: 16.2 G/DL (ref 14–18)
LYMPHOCYTES ABSOLUTE: 1.8 K/UL (ref 1–4.8)
LYMPHOCYTES RELATIVE PERCENT: 31 %
Lab: NORMAL
MCH RBC QN AUTO: 31.5 PG (ref 27–31.3)
MCHC RBC AUTO-ENTMCNC: 33.7 % (ref 33–37)
MCV RBC AUTO: 93.5 FL (ref 80–100)
METHADONE SCREEN, URINE: NORMAL
MONOCYTES ABSOLUTE: 0.8 K/UL (ref 0.2–0.8)
MONOCYTES RELATIVE PERCENT: 13.5 %
NEUTROPHILS ABSOLUTE: 2.9 K/UL (ref 1.4–6.5)
NEUTROPHILS RELATIVE PERCENT: 50.3 %
OPIATE SCREEN URINE: NORMAL
OXYCODONE URINE: NORMAL
PDW BLD-RTO: 13.5 % (ref 11.5–14.5)
PHENCYCLIDINE SCREEN URINE: NORMAL
PLATELET # BLD: 239 K/UL (ref 130–400)
POTASSIUM SERPL-SCNC: 4 MEQ/L (ref 3.4–4.9)
PROPOXYPHENE SCREEN: NORMAL
RBC # BLD: 5.14 M/UL (ref 4.7–6.1)
SALICYLATE, SERUM: 2.1 MG/DL (ref 15–30)
SODIUM BLD-SCNC: 139 MEQ/L (ref 135–144)
TOTAL CK: 105 U/L (ref 0–190)
TOTAL PROTEIN: 7 G/DL (ref 6.3–8)
WBC # BLD: 5.8 K/UL (ref 4.8–10.8)

## 2022-06-02 PROCEDURE — 93005 ELECTROCARDIOGRAM TRACING: CPT

## 2022-06-02 PROCEDURE — 82077 ASSAY SPEC XCP UR&BREATH IA: CPT

## 2022-06-02 PROCEDURE — 80143 DRUG ASSAY ACETAMINOPHEN: CPT

## 2022-06-02 PROCEDURE — 93010 ELECTROCARDIOGRAM REPORT: CPT | Performed by: INTERNAL MEDICINE

## 2022-06-02 PROCEDURE — 80307 DRUG TEST PRSMV CHEM ANLYZR: CPT

## 2022-06-02 PROCEDURE — 99284 EMERGENCY DEPT VISIT MOD MDM: CPT

## 2022-06-02 PROCEDURE — 2500000003 HC RX 250 WO HCPCS

## 2022-06-02 PROCEDURE — 80179 DRUG ASSAY SALICYLATE: CPT

## 2022-06-02 PROCEDURE — 85025 COMPLETE CBC W/AUTO DIFF WBC: CPT

## 2022-06-02 PROCEDURE — 96374 THER/PROPH/DIAG INJ IV PUSH: CPT

## 2022-06-02 PROCEDURE — 80053 COMPREHEN METABOLIC PANEL: CPT

## 2022-06-02 PROCEDURE — 36415 COLL VENOUS BLD VENIPUNCTURE: CPT

## 2022-06-02 PROCEDURE — 6370000000 HC RX 637 (ALT 250 FOR IP)

## 2022-06-02 PROCEDURE — 82550 ASSAY OF CK (CPK): CPT

## 2022-06-02 RX ORDER — LABETALOL HYDROCHLORIDE 5 MG/ML
10 INJECTION, SOLUTION INTRAVENOUS ONCE
Status: COMPLETED | OUTPATIENT
Start: 2022-06-02 | End: 2022-06-02

## 2022-06-02 RX ADMIN — LABETALOL HYDROCHLORIDE 10 MG: 5 INJECTION, SOLUTION INTRAVENOUS at 03:54

## 2022-06-02 RX ADMIN — POISON TREATMENT ADSORBENT 50 G: 50 SUSPENSION ORAL at 02:53

## 2022-06-02 ASSESSMENT — ENCOUNTER SYMPTOMS
DIARRHEA: 0
ABDOMINAL PAIN: 0
NAUSEA: 0
VOMITING: 0
PHOTOPHOBIA: 0
COUGH: 0
SHORTNESS OF BREATH: 0

## 2022-06-02 ASSESSMENT — PAIN - FUNCTIONAL ASSESSMENT
PAIN_FUNCTIONAL_ASSESSMENT: NONE - DENIES PAIN
PAIN_FUNCTIONAL_ASSESSMENT: NONE - DENIES PAIN

## 2022-06-02 NOTE — ED PROVIDER NOTES
[facility] ED  eMERGENCY dEPARTMENT eNCOUnter      Pt Name: Ben Gage  MRN: [id number]  [place] 1975  Date of evaluation: 6/2/2022  Provider: VONDA Ornelas        HISTORY OF PRESENT ILLNESS    Ben Gage is a 51 y.o. male per chart review has ah/o diabetic retinopathy, type 1 diabetes, HLD. Patient presents emergency department for accidental drug ingestion 45 minutes prior to arrival.  Patient states he is partially fine, states he typically lines all of his pills up and has a systematic method of how to take them. States tonight he was not really paying attention and he accidentally ingested no other symptoms no more than 20 count of 50 mg tramadol pills. States he did try to make himself throw up after work but was unsuccessful. He presented straight to the emergency department. He does not believe he ingested any of his other medications states he visualized all the pills come from the same bottle. He states at present he feels completely fine. He is fully alert and oriented providing all the history. No evidence of CNS depression or respiratory distress. No shortness of breath complaints. No abdominal pain. No restlessness agitation or anxiety. No seizure-like activity. Patient completely denies any intention of self-harm, no suicidal thoughts or ideation. REVIEW OF SYSTEMS       Review of Systems   Constitutional: Negative for chills and fever. HENT: Negative for congestion. Eyes: Negative for photophobia. Respiratory: Negative for cough and shortness of breath. Cardiovascular: Negative for chest pain. Gastrointestinal: Negative for abdominal pain, diarrhea, nausea and vomiting. Genitourinary: Negative for difficulty urinating. Musculoskeletal: Negative for myalgias. Neurological: Negative for tremors, seizures, syncope, weakness and headaches.    Psychiatric/Behavioral: Negative for agitation, behavioral problems, confusion, decreased concentration and sleep disturbance. The patient is not nervous/anxious and is not hyperactive. Except as noted above the remainder of the review of systems was reviewed and negative. PAST MEDICAL HISTORY     Past Medical History:   Diagnosis Date    Chest pain of uncertain etiology 2/39/0191    Dizziness 9/28/2017    DVT of leg (deep venous thrombosis) (HCC)     x2    Hyperlipidemia     Hypertension     Rheumatoid arthritis ([facility]     dr Stephane Morejon ht.  Smoker     quit 2008    SOB (shortness of breath) 9/28/2017    Stroke ([facility]     bleed    Type 1 diabetes mellitus, uncontrolled ([facility]          SURGICAL HISTORY     No past surgical history on file. CURRENT MEDICATIONS       Discharge Medication List as of 6/2/2022 11:19 AM      CONTINUE these medications which have NOT CHANGED    Details   insulin lispro (HUMALOG) 100 UNIT/ML SOLN injection vial INJECT SUBCUTANEOUSLY PER DAY VIA PUMP.  MAX  UNITS PER DAY, Disp-60 mL, R-3Normal      warfarin (COUMADIN) 5 MG tablet Take 2 tablets by mouth daily Take as directed, Disp-180 tablet, R-1Normal      azelastine (ASTELIN) 0.1 % nasal spray 1 spray by Nasal route 2 times daily Use in each nostril as directed, Disp-1 each, R-5Normal      lisinopril (PRINIVIL;ZESTRIL) 10 MG tablet TAKE ONE TABLET BY MOUTH EVERY DAY, Disp-90 tablet, R-03Normal      !! simvastatin (ZOCOR) 20 MG tablet Take 1 tablet by mouth every evening, Disp-90 tablet, R-1Normal      dapagliflozin (FARXIGA) 10 MG tablet Take 1 tablet by mouth every morning, Disp-90 tablet, R-1Normal      celecoxib (CELEBREX) 100 MG capsule TAKE ONE CAPSULE BY MOUTH TWICE DAILY AS NEEDEDHistorical Med      !! simvastatin (ZOCOR) 20 MG tablet TAKE ONE (1) TABLET BY MOUTH EVERY EVENING, Disp-90 tablet,R-1Normal      brimonidine (ALPHAGAN) 0.2 % ophthalmic solution INSTILL ONE DROP INTO EACH EYE TWICE DAILY, R-6Historical Med      gabapentin (NEURONTIN) 600 MG tablet TAKE ONE TABLET BY MOUTH FOUR TIMES A DAY AS NEEDED, R-3Historical Med      Insulin Pump Accessories MISC Until Discontinued, Historical Med      traMADol (ULTRAM) 50 MG tablet take one or two tabs three times a day with Tylenol      ALPHAGAN P 0.15 % ophthalmic solution Place 1 drop into both eyes daily. latanoprost (XALATAN) 0.005 % ophthalmic solution Place 1 drop into both eyes. !! - Potential duplicate medications found. Please discuss with provider. ALLERGIES     Patient has no known allergies. FAMILY HISTORY       Family History   Problem Relation Age of Onset    Clotting Disorder Mother         blood clots          SOCIAL HISTORY       Social History     Socioeconomic History    Marital status:      Spouse name: Not on file    Number of children: Not on file    Years of education: Not on file    Highest education level: Not on file   Occupational History    Not on file   Tobacco Use    Smoking status: Former Smoker     Quit date: 2007     Years since quitting: 15.4    Smokeless tobacco: Never Used   Substance and Sexual Activity    Alcohol use: No    Drug use: No    Sexual activity: Not on file   Other Topics Concern    Not on file   Social History Narrative    Not on file     Social Determinants of Health     Financial Resource Strain: Low Risk     Difficulty of Paying Living Expenses: Not hard at all   Food Insecurity: No Food Insecurity    Worried About [address] in the Last Year: Never true    [facility] in the Last Year: Never true   Transportation Needs:     Lack of Transportation (Medical): Not on file    Lack of Transportation (Non-Medical):  Not on file   Physical Activity:     Days of Exercise per Week: Not on file    Minutes of Exercise per Session: Not on file   Stress:     Feeling of Stress : Not on file   Social Connections:     Frequency of Communication with Friends and Family: Not on file    Frequency of Social Gatherings with Friends and Family: Not on file    Attends Sikhism Services: Not on file    Active Member of Clubs or Organizations: Not on file    Attends Club or Organization Meetings: Not on file    Marital Status: Not on file   Intimate Partner Violence:     Fear of Current or Ex-Partner: Not on file    Emotionally Abused: Not on file    Physically Abused: Not on file    Sexually Abused: Not on file   Housing Stability:     Unable to Pay for Housing in the Last Year: Not on file    Number of [place] in the Last Year: Not on file    Unstable Housing in the Last Year: Not on file         PHYSICAL EXAM        ED Triage Vitals [06/02/22 0211]   BP Temp Temp src Heart Rate Resp SpO2 Height Weight   (!) 177/89 -- -- (!) 109 18 97 % -- 185 lb (83.9 kg)       Physical Exam  Constitutional:       General: He is not in acute distress. Appearance: Normal appearance. He is not ill-appearing, toxic-appearing or diaphoretic. HENT:      Head: Normocephalic and atraumatic. Right Ear: External ear normal.      Left Ear: External ear normal.      Nose: Nose normal.      Mouth/Throat:      Mouth: Mucous membranes are moist.      Pharynx: Oropharynx is clear. Eyes:      Extraocular Movements: Extraocular movements intact. Conjunctiva/sclera: Conjunctivae normal.   Cardiovascular:      Rate and Rhythm: Regular rhythm. Tachycardia present. Pulmonary:      Effort: Pulmonary effort is normal. No respiratory distress. Breath sounds: Normal breath sounds. Abdominal:      General: Bowel sounds are normal. There is no distension. Palpations: Abdomen is soft. Tenderness: There is no abdominal tenderness. There is no guarding or rebound. Musculoskeletal:         General: Normal range of motion. Cervical back: Normal range of motion. Skin:     General: Skin is warm. Neurological:      Mental Status: He is alert and oriented to person, place, and time. Mental status is at baseline. GCS: GCS eye subscore is 4.  GCS verbal subscore is 5. GCS motor subscore is 6. Cranial Nerves: Cranial nerves are intact. Sensory: Sensation is intact. Motor: Motor function is intact. Coordination: Coordination is intact. Gait: Gait is intact. Psychiatric:         Attention and Perception: Attention and perception normal.         Mood and Affect: Mood normal. Mood is not anxious or elated. Affect is not inappropriate. Speech: Speech normal. Speech is not rapid and pressured or delayed. Behavior: Behavior normal. Behavior is not agitated, slowed, aggressive, withdrawn or hyperactive. Thought Content: Thought content normal. Thought content is not paranoid or delusional. Thought content does not include homicidal or suicidal ideation. Thought content does not include homicidal or suicidal plan.          Cognition and Memory: Cognition and memory normal.         Judgment: Judgment normal.           LABS:  Labs Reviewed   COMPREHENSIVE METABOLIC PANEL - Abnormal; Notable for the following components:       Result Value    Glucose 145 (*)     CREATININE 0.64 (*)     All other components within normal limits   CBC WITH AUTO DIFFERENTIAL - Abnormal; Notable for the following components:    MCH 31.5 (*)     All other components within normal limits   SALICYLATE LEVEL - Abnormal; Notable for the following components:    Salicylate, Serum 2.1 (*)     All other components within normal limits   ACETAMINOPHEN LEVEL - Abnormal; Notable for the following components:    Acetaminophen Level <5 (*)     All other components within normal limits   ETHANOL   CK   URINE DRUG SCREEN     EKG sinus tachycardia  regular intervals no axis deviation no acute ST changes QT/QTc 354/459    MDM:   Vitals:    Vitals:    06/02/22 0700 06/02/22 0745 06/02/22 0815 06/02/22 0830   BP: 133/83 (!) 142/93 (!) 135/93 (!) 138/98   Pulse: 99 99 96 95   Resp: 18 17 17 18   Temp:       TempSrc:       SpO2: 92% 93% 92% 94%   Weight: Patient to the emergency department for accidental drug ingestion prior to arrival.  10-20 count of 50 mg tramadol pills ingested. This occurred around 01 45. Patient on arrival is tachycardic, hypertensive. Afebrile. Fully alert and oriented providing all of his history. No acute complaints. Discussed with poison control immediately on arrival recommending activated charcoal.  Also recommending 10-hour observation in the ED. States peak of his medication is about 1 to 2 hours. Signs and symptoms to observe include CNS depression, respiratory distress, hypertension, tachycardia, agitation, restlessness, seizure-like activity. Pt is hypertensive and tachycardic in the ED given 10 IV labetalol and resolves. Patient assessed several times while in the ED and remains without complaints, he is sitting up in his bed watching videos on his phone, drinking water. No distress or symptoms. Will be signed out at 06 100 pending disposition. CRITICAL CARE TIME   Total CriticalCare time was 0 minutes, excluding separately reportable procedures. There was a high probability of clinically significant/life threatening deterioration in the patient's condition which required my urgent intervention. PROCEDURES:  Unlessotherwise noted below, none      Procedures      FINAL IMPRESSION      1.  Accidental overdose, initial encounter          DISPOSITION/PLAN   DISPOSITION Decision To Discharge 06/02/2022 11:19:23 AM          VONDA Carlson (electronically signed)  Attending Emergency Physician          Hosea Carlson  06/03/22 5150

## 2022-07-02 DIAGNOSIS — I63.89 CEREBROVASCULAR ACCIDENT (CVA) DUE TO OTHER MECHANISM (HCC): ICD-10-CM

## 2022-07-02 LAB
INR BLD: 1.7
PROTHROMBIN TIME: 19.6 SEC (ref 12.3–14.9)

## 2022-07-25 ENCOUNTER — TELEPHONE (OUTPATIENT)
Dept: PRIMARY CARE CLINIC | Age: 47
End: 2022-07-25

## 2022-07-25 NOTE — TELEPHONE ENCOUNTER
----- Message from Galilea Israel MA sent at 7/22/2022 12:09 PM EDT -----  Subject: Message to Provider    QUESTIONS  Information for Provider? Has several questions about his Coumadin Rx. He   says it is almost doubled and he thinks he was given the wrong   instructions. Please call patient.   ---------------------------------------------------------------------------  --------------  David Gil [facility]  [phone]; OK to leave message on voicemail  ---------------------------------------------------------------------------  --------------  SCRIPT ANSWERS  Relationship to Patient?  Self

## 2022-07-26 NOTE — TELEPHONE ENCOUNTER
Pt has Coumadin 5 mg tab. Currently taking 10 mg x1 day/5 mg x6 days. Will take 1 extra tab then resume regular dose.  Aramis 1 months

## 2022-08-05 NOTE — TELEPHONE ENCOUNTER
Patient requesting medication refill. Please approve or deny this request.    Rx requested:  Requested Prescriptions     Pending Prescriptions Disp Refills    simvastatin (ZOCOR) 20 MG tablet 90 tablet 1     Sig: Take 1 tablet by mouth every evening         Last Office Visit:   3/1/2022      Next Visit Date:  No future appointments.

## 2022-08-08 RX ORDER — SIMVASTATIN 20 MG
20 TABLET ORAL EVERY EVENING
Qty: 90 TABLET | Refills: 1 | Status: SHIPPED | OUTPATIENT
Start: 2022-08-08

## 2022-08-27 DIAGNOSIS — I63.89 CEREBROVASCULAR ACCIDENT (CVA) DUE TO OTHER MECHANISM (HCC): ICD-10-CM

## 2022-08-27 LAB
INR BLD: 1.8
PROTHROMBIN TIME: 21.4 SEC (ref 12.3–14.9)

## 2022-08-29 RX ORDER — DAPAGLIFLOZIN 10 MG/1
TABLET, FILM COATED ORAL
Qty: 90 TABLET | Refills: 3 | Status: SHIPPED | OUTPATIENT
Start: 2022-08-29

## 2022-10-08 DIAGNOSIS — I63.89 CEREBROVASCULAR ACCIDENT (CVA) DUE TO OTHER MECHANISM (HCC): ICD-10-CM

## 2022-10-08 LAB
INR BLD: 2.5
PROTHROMBIN TIME: 26.9 SEC (ref 12.3–14.9)

## 2022-11-11 ENCOUNTER — OFFICE VISIT (OUTPATIENT)
Dept: ENDOCRINOLOGY | Age: 47
End: 2022-11-11
Payer: MEDICARE

## 2022-11-11 VITALS
OXYGEN SATURATION: 96 % | HEART RATE: 66 BPM | BODY MASS INDEX: 27.99 KG/M2 | DIASTOLIC BLOOD PRESSURE: 69 MMHG | HEIGHT: 69 IN | WEIGHT: 189 LBS | SYSTOLIC BLOOD PRESSURE: 108 MMHG

## 2022-11-11 DIAGNOSIS — E10.9 TYPE 1 DIABETES MELLITUS WITHOUT COMPLICATION (HCC): ICD-10-CM

## 2022-11-11 DIAGNOSIS — I63.89 CEREBROVASCULAR ACCIDENT (CVA) DUE TO OTHER MECHANISM (HCC): ICD-10-CM

## 2022-11-11 DIAGNOSIS — E10.9 TYPE 1 DIABETES MELLITUS WITHOUT COMPLICATION (HCC): Primary | ICD-10-CM

## 2022-11-11 LAB
ANION GAP SERPL CALCULATED.3IONS-SCNC: 12 MEQ/L (ref 9–15)
BUN BLDV-MCNC: 12 MG/DL (ref 6–20)
CALCIUM SERPL-MCNC: 9.2 MG/DL (ref 8.5–9.9)
CHLORIDE BLD-SCNC: 102 MEQ/L (ref 95–107)
CHP ED QC CHECK: NORMAL
CO2: 28 MEQ/L (ref 20–31)
CREAT SERPL-MCNC: 0.54 MG/DL (ref 0.7–1.2)
GFR SERPL CREATININE-BSD FRML MDRD: >60 ML/MIN/{1.73_M2}
GLUCOSE BLD-MCNC: 111 MG/DL
GLUCOSE FASTING: 98 MG/DL (ref 70–99)
HBA1C MFR BLD: 6.8 %
HBA1C MFR BLD: 7.3 % (ref 4.8–5.9)
INR BLD: 2
POTASSIUM SERPL-SCNC: 4.9 MEQ/L (ref 3.4–4.9)
PROTHROMBIN TIME: 22.5 SEC (ref 12.3–14.9)
SODIUM BLD-SCNC: 142 MEQ/L (ref 135–144)

## 2022-11-11 PROCEDURE — 99213 OFFICE O/P EST LOW 20 MIN: CPT | Performed by: INTERNAL MEDICINE

## 2022-11-11 PROCEDURE — 83036 HEMOGLOBIN GLYCOSYLATED A1C: CPT | Performed by: INTERNAL MEDICINE

## 2022-11-11 PROCEDURE — 82962 GLUCOSE BLOOD TEST: CPT | Performed by: INTERNAL MEDICINE

## 2022-11-11 PROCEDURE — 3051F HG A1C>EQUAL 7.0%<8.0%: CPT | Performed by: INTERNAL MEDICINE

## 2022-11-11 RX ORDER — INSULIN LISPRO 100 [IU]/ML
INJECTION, SOLUTION INTRAVENOUS; SUBCUTANEOUS
Qty: 60 ML | Refills: 3 | Status: SHIPPED | OUTPATIENT
Start: 2022-11-11

## 2022-11-11 NOTE — PROGRESS NOTES
11/11/2022               7.3 (H)             4.8 - 5.9 %         Final            ----------  ) An ACE inhibitor/angiotensin II receptor blocker is being taken. Past Medical History:   Diagnosis Date    Chest pain of uncertain etiology 8/58/5481    Dizziness 9/28/2017    DVT of leg (deep venous thrombosis) (HCC)     x2    Hyperlipidemia     Hypertension     [facility]     dr Abida Regalado ht. Smoker     quit 2008    SOB (shortness of breath) 9/28/2017    Stroke (HCC)     bleed    Type 1 diabetes mellitus, uncontrolled      No past surgical history on file. Social History     Socioeconomic History    Marital status:      Spouse name: Not on file    Number of children: Not on file    Years of education: Not on file    Highest education level: Not on file   Occupational History    Not on file   Tobacco Use    Smoking status: Former     Types: Cigarettes     Quit date: 2007     Years since quitting: 15.8    Smokeless tobacco: Never   Substance and Sexual Activity    Alcohol use: No    Drug use: No    Sexual activity: Not on file   Other Topics Concern    Not on file   Social History Narrative    Not on file     Social Determinants of Health     Financial Resource Strain: Not on file   Food Insecurity: Not on file   Transportation Needs: Not on file   Physical Activity: Not on file   Stress: Not on file   Social Connections: Not on file   Intimate Partner Violence: Not on file   Housing Stability: Not on file     Family History   Problem Relation Age of Onset    Clotting Disorder Mother         blood clots     No Known Allergies    Current Outpatient Medications:     FARXIGA 10 MG tablet, TAKE 1 TABLET BY MOUTH ONCE DAILY IN THE MORNING, Disp: 90 tablet, Rfl: 3    simvastatin (ZOCOR) 20 MG tablet, Take 1 tablet by mouth every evening, Disp: 90 tablet, Rfl: 1    insulin lispro (HUMALOG) 100 UNIT/ML SOLN injection vial, INJECT SUBCUTANEOUSLY PER DAY VIA PUMP.  MAX  UNITS PER DAY, Disp: 60 mL, Rfl: 3    warfarin (COUMADIN) 5 MG tablet, Take 2 tablets by mouth daily Take as directed (Patient taking differently: Take 5 mg by mouth daily Taking 5 mg 6 days and 10 mg 1 day), Disp: 180 tablet, Rfl: 1    azelastine (ASTELIN) 0.1 % nasal spray, 1 spray by Nasal route 2 times daily Use in each nostril as directed, Disp: 1 each, Rfl: 5    lisinopril (PRINIVIL;ZESTRIL) 10 MG tablet, TAKE ONE TABLET BY MOUTH EVERY DAY, Disp: 90 tablet, Rfl: 03    celecoxib (CELEBREX) 100 MG capsule, TAKE ONE CAPSULE BY MOUTH TWICE DAILY AS NEEDED, Disp: , Rfl:     brimonidine (ALPHAGAN) 0.2 % ophthalmic solution, INSTILL ONE DROP INTO EACH EYE TWICE DAILY, Disp: , Rfl: 6    gabapentin (NEURONTIN) 600 MG tablet, TAKE ONE TABLET BY MOUTH FOUR TIMES A DAY AS NEEDED, Disp: , Rfl: 3    Insulin Pump Accessories MISC, by Does not apply route, Disp: , Rfl:     traMADol (ULTRAM) 50 MG tablet, take one or two tabs three times a day with Tylenol, Disp: , Rfl:     ALPHAGAN P 0.15 % ophthalmic solution, Place 1 drop into both eyes daily. , Disp: , Rfl:     latanoprost (XALATAN) 0.005 % ophthalmic solution, Place 1 drop into both eyes. , Disp: , Rfl:   Lab Results   Component Value Date     06/02/2022    K 4.0 06/02/2022     06/02/2022    CO2 26 06/02/2022    BUN 16 06/02/2022    CREATININE 0.64 (L) 06/02/2022    GLUCOSE 111 11/11/2022    CALCIUM 8.8 06/02/2022    PROT 7.0 06/02/2022    LABALBU 4.4 06/02/2022    BILITOT <0.2 06/02/2022    ALKPHOS 67 06/02/2022    AST 19 06/02/2022    ALT 25 06/02/2022    LABGLOM >60.0 06/02/2022    GFRAA >60.0 06/02/2022    GLOB 2.6 06/02/2022     Lab Results   Component Value Date    WBC 5.8 06/02/2022    HGB 16.2 06/02/2022    HCT 48.0 06/02/2022    MCV 93.5 06/02/2022     06/02/2022     Lab Results   Component Value Date    LABA1C 6.8 11/11/2022    LABA1C 7.5 (H) 04/02/2022    LABA1C 7.8 (H) 08/14/2021     Lab Results   Component Value Date    HDL 42 04/02/2022    HDL 44 07/25/2020    HDL 47 08/06/2018    LDLCALC 85 04/02/2022    LDLCALC 67 07/25/2020    LDLCALC 61 08/06/2018    CHOL 167 04/02/2022    CHOL 186 07/25/2020    CHOL 142 08/06/2018    TRIG 199 (H) 04/02/2022    TRIG 376 (H) 07/25/2020    TRIG 172 08/06/2018     No results found for: TESTM  Lab Results   Component Value Date    TSH 1.070 11/14/2016     No results found for: TPOABS    Review of Systems    Objective:   Physical Exam  Vitals reviewed. Constitutional:       General: He is not in acute distress. Appearance: Normal appearance. He is normal weight. HENT:      Head: Normocephalic and atraumatic. Right Ear: External ear normal.      Left Ear: External ear normal.      Nose: Nose normal.   Eyes:      General: No scleral icterus. Right eye: No discharge. Left eye: No discharge. Extraocular Movements: Extraocular movements intact. Conjunctiva/sclera: Conjunctivae normal.   Cardiovascular:      Rate and Rhythm: Normal rate. Pulmonary:      Effort: Pulmonary effort is normal.   Musculoskeletal:         General: Normal range of motion. Cervical back: Normal range of motion and neck supple. Feet:    Neurological:      General: No focal deficit present. Mental Status: He is alert and oriented to person, place, and time.    Psychiatric:         Mood and Affect: Mood normal.         Behavior: Behavior normal.

## 2022-11-20 RX ORDER — INSULIN LISPRO-AABC 100 [IU]/ML
INJECTION, SOLUTION INTRAVENOUS; SUBCUTANEOUS
Qty: 2 EACH | Refills: 0 | COMMUNITY
Start: 2022-11-20

## 2022-11-20 ASSESSMENT — ENCOUNTER SYMPTOMS: VISUAL CHANGE: 1

## 2023-01-03 DIAGNOSIS — E10.9 TYPE 1 DIABETES MELLITUS WITHOUT COMPLICATION (HCC): ICD-10-CM

## 2023-01-03 RX ORDER — INSULIN LISPRO 100 [IU]/ML
INJECTION, SOLUTION INTRAVENOUS; SUBCUTANEOUS
Qty: 60 ML | Refills: 3 | Status: SHIPPED | OUTPATIENT
Start: 2023-01-03

## 2023-01-11 DIAGNOSIS — I63.89 CEREBROVASCULAR ACCIDENT (CVA) DUE TO OTHER MECHANISM (HCC): ICD-10-CM

## 2023-01-11 LAB
INR BLD: 1.7
PROTHROMBIN TIME: 20.2 SEC (ref 12.3–14.9)

## 2023-01-30 RX ORDER — SIMVASTATIN 20 MG
TABLET ORAL
Qty: 90 TABLET | Refills: 1 | Status: SHIPPED | OUTPATIENT
Start: 2023-01-30

## 2023-02-18 DIAGNOSIS — I63.89 CEREBROVASCULAR ACCIDENT (CVA) DUE TO OTHER MECHANISM (HCC): ICD-10-CM

## 2023-02-18 LAB
INR BLD: 1.9
PROTHROMBIN TIME: 21.8 SEC (ref 12.3–14.9)

## 2023-03-25 DIAGNOSIS — I63.89 CEREBROVASCULAR ACCIDENT (CVA) DUE TO OTHER MECHANISM (HCC): ICD-10-CM

## 2023-03-25 LAB
INR PPP: 1.8
PROTHROMBIN TIME: 20.8 SEC (ref 12.3–14.9)

## 2023-03-29 DIAGNOSIS — I63.89 CEREBROVASCULAR ACCIDENT (CVA) DUE TO OTHER MECHANISM (HCC): Primary | ICD-10-CM

## 2023-04-17 DIAGNOSIS — E78.00 HYPERCHOLESTEREMIA: ICD-10-CM

## 2023-04-17 RX ORDER — LISINOPRIL 10 MG/1
TABLET ORAL
Qty: 90 TABLET | Refills: 3 | Status: SHIPPED | OUTPATIENT
Start: 2023-04-17

## 2023-04-22 DIAGNOSIS — I63.89 CEREBROVASCULAR ACCIDENT (CVA) DUE TO OTHER MECHANISM (HCC): ICD-10-CM

## 2023-04-22 LAB
INR PPP: 2
PROTHROMBIN TIME: 22.8 SEC (ref 12.3–14.9)

## 2023-05-27 DIAGNOSIS — I63.89 CEREBROVASCULAR ACCIDENT (CVA) DUE TO OTHER MECHANISM (HCC): ICD-10-CM

## 2023-05-27 LAB
INR PPP: 2
PROTHROMBIN TIME: 22.9 SEC (ref 12.3–14.9)

## 2023-07-05 ENCOUNTER — OFFICE VISIT (OUTPATIENT)
Dept: ENDOCRINOLOGY | Age: 48
End: 2023-07-05

## 2023-07-05 VITALS
WEIGHT: 177 LBS | BODY MASS INDEX: 26.22 KG/M2 | DIASTOLIC BLOOD PRESSURE: 90 MMHG | SYSTOLIC BLOOD PRESSURE: 147 MMHG | HEIGHT: 69 IN | OXYGEN SATURATION: 95 % | HEART RATE: 96 BPM

## 2023-07-05 DIAGNOSIS — E78.00 HYPERCHOLESTEREMIA: ICD-10-CM

## 2023-07-05 DIAGNOSIS — E10.9 TYPE 1 DIABETES MELLITUS WITHOUT COMPLICATION (HCC): Primary | ICD-10-CM

## 2023-07-05 LAB
CHP ED QC CHECK: NORMAL
GLUCOSE BLD-MCNC: 89 MG/DL
HBA1C MFR BLD: 6.4 %

## 2023-07-05 RX ORDER — LISINOPRIL 10 MG/1
TABLET ORAL
Qty: 90 TABLET | Refills: 3 | Status: SHIPPED | OUTPATIENT
Start: 2023-07-05

## 2023-07-05 RX ORDER — INSULIN LISPRO 100 [IU]/ML
INJECTION, SOLUTION INTRAVENOUS; SUBCUTANEOUS
Qty: 60 ML | Refills: 3 | Status: SHIPPED | OUTPATIENT
Start: 2023-07-05

## 2023-07-05 RX ORDER — SIMVASTATIN 20 MG
20 TABLET ORAL NIGHTLY
Qty: 90 TABLET | Refills: 1 | Status: SHIPPED | OUTPATIENT
Start: 2023-07-05

## 2023-07-05 NOTE — PROGRESS NOTES
Narrative    Not on file     Social Determinants of Health     Financial Resource Strain: Not on file   Food Insecurity: Not on file   Transportation Needs: Not on file   Physical Activity: Not on file   Stress: Not on file   Social Connections: Not on file   Intimate Partner Violence: Not on file   Housing Stability: Not on file     Family History   Problem Relation Age of Onset    Clotting Disorder Mother         blood clots     No Known Allergies    Current Outpatient Medications:     lisinopril (PRINIVIL;ZESTRIL) 10 MG tablet, TAKE ONE TABLET BY MOUTH EVERY DAY, Disp: 90 tablet, Rfl: 03    insulin lispro (HUMALOG) 100 UNIT/ML SOLN injection vial, INJECT SUBCUTANEOUSLY PER DAY VIA PUMP.  MAX  UNITS PER DAY, Disp: 60 mL, Rfl: 3    simvastatin (ZOCOR) 20 MG tablet, TAKE 1 TABLET BY MOUTH ONCE DAILY IN THE EVENING, Disp: 90 tablet, Rfl: 1    Insulin Lispro-aabc (LYUMJEV) 100 UNIT/ML SOLN, [id number], 5-19-23, Disp: 2 bottles, Disp: 2 each, Rfl: 0    FARXIGA 10 MG tablet, TAKE 1 TABLET BY MOUTH ONCE DAILY IN THE MORNING, Disp: 90 tablet, Rfl: 3    warfarin (COUMADIN) 5 MG tablet, Take 2 tablets by mouth daily Take as directed (Patient taking differently: Take 1 tablet by mouth daily Taking 5 mg 6 days and 10 mg 1 day), Disp: 180 tablet, Rfl: 1    azelastine (ASTELIN) 0.1 % nasal spray, 1 spray by Nasal route 2 times daily Use in each nostril as directed, Disp: 1 each, Rfl: 5    celecoxib (CELEBREX) 100 MG capsule, TAKE ONE CAPSULE BY MOUTH TWICE DAILY AS NEEDED, Disp: , Rfl:     brimonidine (ALPHAGAN) 0.2 % ophthalmic solution, INSTILL ONE DROP INTO EACH EYE TWICE DAILY, Disp: , Rfl: 6    gabapentin (NEURONTIN) 600 MG tablet, TAKE ONE TABLET BY MOUTH FOUR TIMES A DAY AS NEEDED, Disp: , Rfl: 3    Insulin Pump Accessories MISC, by Does not apply route, Disp: , Rfl:     traMADol (ULTRAM) 50 MG tablet, take one or two tabs three times a day with Tylenol, Disp: , Rfl:     ALPHAGAN P 0.15 % ophthalmic solution, Place 1

## 2023-07-06 RX ORDER — INSULIN LISPRO-AABC 100 [IU]/ML
INJECTION, SOLUTION INTRAVENOUS; SUBCUTANEOUS
Qty: 2 EACH | Refills: 0 | COMMUNITY
Start: 2023-07-06

## 2023-07-06 ASSESSMENT — ENCOUNTER SYMPTOMS: VISUAL CHANGE: 1

## 2023-07-22 DIAGNOSIS — I63.89 CEREBROVASCULAR ACCIDENT (CVA) DUE TO OTHER MECHANISM (HCC): ICD-10-CM

## 2023-07-22 LAB
INR PPP: 2.3
PROTHROMBIN TIME: 25.4 SEC (ref 12.3–14.9)

## 2023-09-06 DIAGNOSIS — I63.89 CEREBROVASCULAR ACCIDENT (CVA) DUE TO OTHER MECHANISM (HCC): ICD-10-CM

## 2023-09-06 LAB
INR PPP: 2.6
PROTHROMBIN TIME: 27.6 SEC (ref 12.3–14.9)

## 2023-09-13 ENCOUNTER — TELEPHONE (OUTPATIENT)
Dept: PRIMARY CARE CLINIC | Age: 48
End: 2023-09-13

## 2023-09-13 DIAGNOSIS — I63.89 CEREBROVASCULAR ACCIDENT (CVA) DUE TO OTHER MECHANISM (HCC): ICD-10-CM

## 2023-09-13 RX ORDER — WARFARIN SODIUM 5 MG/1
5 TABLET ORAL DAILY
Qty: 180 TABLET | Refills: 0 | Status: SHIPPED | OUTPATIENT
Start: 2023-09-13

## 2023-09-13 NOTE — TELEPHONE ENCOUNTER
Warfarin rx needed to be sent to [facility] in [place], he needs this same as before just new pharmacy.

## 2023-11-10 DIAGNOSIS — E10.9 TYPE 1 DIABETES MELLITUS WITHOUT COMPLICATION (HCC): ICD-10-CM

## 2023-11-10 RX ORDER — INSULIN LISPRO 100 [IU]/ML
INJECTION, SOLUTION INTRAVENOUS; SUBCUTANEOUS
Qty: 60 ML | Refills: 3 | Status: SHIPPED | OUTPATIENT
Start: 2023-11-10

## 2023-11-10 NOTE — TELEPHONE ENCOUNTER
Patient requesting medication refill. Please approve or deny this request.    Rx requested:  Requested Prescriptions     Pending Prescriptions Disp Refills    insulin lispro (HUMALOG) 100 UNIT/ML SOLN injection vial 60 mL 3     Sig: INJECT SUBCUTANEOUSLY PER DAY VIA PUMP.  MAX  UNITS PER DAY         Last Office Visit:   7/5/2023      Next Visit Date:  Future Appointments   Date Time Provider [address]   1/5/2024  9:00 AM Lyndsey Vera MD [facility]

## 2023-11-25 DIAGNOSIS — I63.89 CEREBROVASCULAR ACCIDENT (CVA) DUE TO OTHER MECHANISM (HCC): ICD-10-CM

## 2023-11-25 LAB
INR PPP: 1.8
PROTHROMBIN TIME: 21.6 SEC (ref 12.3–14.9)

## 2023-12-04 DIAGNOSIS — I63.89 CEREBROVASCULAR ACCIDENT (CVA) DUE TO OTHER MECHANISM (HCC): ICD-10-CM

## 2023-12-04 RX ORDER — WARFARIN SODIUM 5 MG/1
TABLET ORAL
Qty: 180 TABLET | Refills: 0 | Status: SHIPPED | OUTPATIENT
Start: 2023-12-04

## 2024-01-07 DIAGNOSIS — E10.9 TYPE 1 DIABETES MELLITUS WITHOUT COMPLICATION (HCC): ICD-10-CM

## 2024-01-08 RX ORDER — SIMVASTATIN 20 MG
20 TABLET ORAL NIGHTLY
Qty: 90 TABLET | Refills: 1 | Status: SHIPPED | OUTPATIENT
Start: 2024-01-08

## 2024-01-13 DIAGNOSIS — I63.89 CEREBROVASCULAR ACCIDENT (CVA) DUE TO OTHER MECHANISM (HCC): ICD-10-CM

## 2024-01-13 LAB
INR PPP: 1.9
PROTHROMBIN TIME: 22.5 SEC (ref 12.3–14.9)

## 2024-01-29 ENCOUNTER — OFFICE VISIT (OUTPATIENT)
Dept: ENDOCRINOLOGY | Age: 49
End: 2024-01-29
Payer: MEDICARE

## 2024-01-29 VITALS
HEART RATE: 98 BPM | SYSTOLIC BLOOD PRESSURE: 140 MMHG | OXYGEN SATURATION: 94 % | BODY MASS INDEX: 26.96 KG/M2 | HEIGHT: 69 IN | WEIGHT: 182 LBS | DIASTOLIC BLOOD PRESSURE: 90 MMHG

## 2024-01-29 DIAGNOSIS — J01.80 OTHER SUBACUTE SINUSITIS: ICD-10-CM

## 2024-01-29 DIAGNOSIS — E10.9 TYPE 1 DIABETES MELLITUS WITHOUT COMPLICATION (HCC): Primary | ICD-10-CM

## 2024-01-29 DIAGNOSIS — I10 PRIMARY HYPERTENSION: ICD-10-CM

## 2024-01-29 LAB
CHP ED QC CHECK: NORMAL
GLUCOSE BLD-MCNC: 112 MG/DL
HBA1C MFR BLD: 7.9 %

## 2024-01-29 PROCEDURE — 3080F DIAST BP >= 90 MM HG: CPT | Performed by: INTERNAL MEDICINE

## 2024-01-29 PROCEDURE — 83036 HEMOGLOBIN GLYCOSYLATED A1C: CPT | Performed by: INTERNAL MEDICINE

## 2024-01-29 PROCEDURE — 3077F SYST BP >= 140 MM HG: CPT | Performed by: INTERNAL MEDICINE

## 2024-01-29 PROCEDURE — 3051F HG A1C>EQUAL 7.0%<8.0%: CPT | Performed by: INTERNAL MEDICINE

## 2024-01-29 PROCEDURE — 99213 OFFICE O/P EST LOW 20 MIN: CPT | Performed by: INTERNAL MEDICINE

## 2024-01-29 PROCEDURE — 82962 GLUCOSE BLOOD TEST: CPT | Performed by: INTERNAL MEDICINE

## 2024-01-29 RX ORDER — DORZOLAMIDE HCL 20 MG/ML
SOLUTION/ DROPS OPHTHALMIC
COMMUNITY
Start: 2024-01-10

## 2024-01-29 RX ORDER — LISINOPRIL 20 MG/1
20 TABLET ORAL DAILY
Qty: 30 TABLET | Refills: 5 | Status: SHIPPED | OUTPATIENT
Start: 2024-01-29

## 2024-01-29 RX ORDER — INSULIN LISPRO 100 [IU]/ML
INJECTION, SOLUTION INTRAVENOUS; SUBCUTANEOUS
Qty: 60 ML | Refills: 3 | Status: SHIPPED | OUTPATIENT
Start: 2024-01-29

## 2024-01-29 RX ORDER — AZITHROMYCIN 250 MG/1
250 TABLET, FILM COATED ORAL SEE ADMIN INSTRUCTIONS
Qty: 6 TABLET | Refills: 1 | Status: SHIPPED | OUTPATIENT
Start: 2024-01-29 | End: 2024-02-03

## 2024-01-29 RX ORDER — TIMOLOL MALEATE 5 MG/ML
SOLUTION/ DROPS OPHTHALMIC
COMMUNITY
Start: 2024-01-15

## 2024-01-29 NOTE — PROGRESS NOTES
1/29/2024    Assessment:       Diagnosis Orders   1. Type 1 diabetes mellitus without complication (HCC)  POCT Glucose    POCT glycosylated hemoglobin (Hb A1C)    Hemoglobin A1C    Basic Metabolic Panel    Microalbumin / Creatinine Urine Ratio    insulin lispro (HUMALOG) 100 UNIT/ML SOLN injection vial    dapagliflozin (FARXIGA) 10 MG tablet      2. Other subacute sinusitis              PLAN:     Orders Placed This Encounter   Procedures    Hemoglobin A1C     Standing Status:   Future     Standing Expiration Date:   1/29/2025    Basic Metabolic Panel     Standing Status:   Future     Standing Expiration Date:   1/29/2025    Microalbumin / Creatinine Urine Ratio     Standing Status:   Future     Standing Expiration Date:   1/29/2025    POCT Glucose    POCT glycosylated hemoglobin (Hb A1C)     Orders Placed This Encounter   Medications    insulin lispro (HUMALOG) 100 UNIT/ML SOLN injection vial     Sig: INJECT SUBCUTANEOUSLY PER DAY VIA PUMP. MAX  UNITS PER DAY     Dispense:  60 mL     Refill:  3    dapagliflozin (FARXIGA) 10 MG tablet     Sig: Take 1 tablet by mouth every morning     Dispense:  90 tablet     Refill:  3    azithromycin (ZITHROMAX) 250 MG tablet     Sig: Take 1 tablet by mouth See Admin Instructions for 5 days 500mg on day 1 followed by 250mg on days 2 - 5     Dispense:  6 tablet     Refill:  1    lisinopril (PRINIVIL;ZESTRIL) 20 MG tablet     Sig: Take 1 tablet by mouth daily     Dispense:  30 tablet     Refill:  5   Increase dose of lisinopril  Continue current dose of Humalog via pump continue Farxiga prescription given for Z-Roman  Patient is good about trying to upgrade to "[employer] 780 pump and sensor    Orders Placed This Encounter   Procedures    POCT Glucose    POCT glycosylated hemoglobin (Hb A1C)     No orders of the defined types were placed in this encounter.    No follow-ups on file.  Subjective:     Chief Complaint   Patient presents with    Diabetes    Hyperlipidemia     Vitals:

## 2024-01-30 ASSESSMENT — ENCOUNTER SYMPTOMS
VISUAL CHANGE: 1
SINUS PRESSURE: 1

## 2024-02-27 ENCOUNTER — TELEPHONE (OUTPATIENT)
Dept: PRIMARY CARE CLINIC | Age: 49
End: 2024-02-27

## 2024-02-27 DIAGNOSIS — I82.409 RECURRENT ACUTE DEEP VEIN THROMBOSIS (DVT) OF LOWER EXTREMITY, UNSPECIFIED LATERALITY (HCC): ICD-10-CM

## 2024-02-27 DIAGNOSIS — I63.89 CEREBROVASCULAR ACCIDENT (CVA) DUE TO OTHER MECHANISM (HCC): Primary | ICD-10-CM

## 2024-03-02 DIAGNOSIS — I82.409 RECURRENT ACUTE DEEP VEIN THROMBOSIS (DVT) OF LOWER EXTREMITY, UNSPECIFIED LATERALITY (HCC): ICD-10-CM

## 2024-03-02 DIAGNOSIS — I63.89 CEREBROVASCULAR ACCIDENT (CVA) DUE TO OTHER MECHANISM (HCC): ICD-10-CM

## 2024-03-02 LAB
INR PPP: 1.9
PROTHROMBIN TIME: 22.2 SEC (ref 12.3–14.9)

## 2024-03-15 ENCOUNTER — TELEPHONE (OUTPATIENT)
Dept: PRIMARY CARE CLINIC | Age: 49
End: 2024-03-15

## 2024-04-20 DIAGNOSIS — I82.409 RECURRENT ACUTE DEEP VEIN THROMBOSIS (DVT) OF LOWER EXTREMITY, UNSPECIFIED LATERALITY (HCC): ICD-10-CM

## 2024-04-20 DIAGNOSIS — I63.89 CEREBROVASCULAR ACCIDENT (CVA) DUE TO OTHER MECHANISM (HCC): ICD-10-CM

## 2024-04-20 LAB
INR PPP: 2
PROTHROMBIN TIME: 22.8 SEC (ref 12.3–14.9)

## 2024-06-01 DIAGNOSIS — I63.89 CEREBROVASCULAR ACCIDENT (CVA) DUE TO OTHER MECHANISM (HCC): ICD-10-CM

## 2024-06-03 RX ORDER — WARFARIN SODIUM 5 MG/1
TABLET ORAL
Qty: 180 TABLET | Refills: 0 | Status: SHIPPED | OUTPATIENT
Start: 2024-06-03

## 2024-06-08 DIAGNOSIS — I82.409 RECURRENT ACUTE DEEP VEIN THROMBOSIS (DVT) OF LOWER EXTREMITY, UNSPECIFIED LATERALITY (HCC): ICD-10-CM

## 2024-06-08 DIAGNOSIS — I63.89 CEREBROVASCULAR ACCIDENT (CVA) DUE TO OTHER MECHANISM (HCC): ICD-10-CM

## 2024-06-08 LAB
INR PPP: 2
PROTHROMBIN TIME: 22.5 SEC (ref 12.3–14.9)

## 2024-07-06 DIAGNOSIS — E10.9 TYPE 1 DIABETES MELLITUS WITHOUT COMPLICATION (HCC): ICD-10-CM

## 2024-07-08 RX ORDER — SIMVASTATIN 20 MG
20 TABLET ORAL NIGHTLY
Qty: 90 TABLET | Refills: 1 | Status: SHIPPED | OUTPATIENT
Start: 2024-07-08

## 2024-07-15 DIAGNOSIS — E10.9 TYPE 1 DIABETES MELLITUS WITHOUT COMPLICATION (HCC): ICD-10-CM

## 2024-07-15 RX ORDER — INSULIN LISPRO 100 [IU]/ML
INJECTION, SOLUTION INTRAVENOUS; SUBCUTANEOUS
Qty: 60 ML | Refills: 3 | Status: SHIPPED | OUTPATIENT
Start: 2024-07-15

## 2024-07-23 RX ORDER — LISINOPRIL 20 MG/1
20 TABLET ORAL DAILY
Qty: 30 TABLET | Refills: 3 | Status: SHIPPED | OUTPATIENT
Start: 2024-07-23

## 2024-08-03 DIAGNOSIS — I63.89 CEREBROVASCULAR ACCIDENT (CVA) DUE TO OTHER MECHANISM (HCC): ICD-10-CM

## 2024-08-03 DIAGNOSIS — I82.409 RECURRENT ACUTE DEEP VEIN THROMBOSIS (DVT) OF LOWER EXTREMITY, UNSPECIFIED LATERALITY (HCC): ICD-10-CM

## 2024-08-03 DIAGNOSIS — E10.9 TYPE 1 DIABETES MELLITUS WITHOUT COMPLICATION (HCC): ICD-10-CM

## 2024-08-03 LAB
ANION GAP SERPL CALCULATED.3IONS-SCNC: 10 MEQ/L (ref 9–15)
BUN SERPL-MCNC: 10 MG/DL (ref 6–20)
CALCIUM SERPL-MCNC: 9 MG/DL (ref 8.5–9.9)
CHLORIDE SERPL-SCNC: 101 MEQ/L (ref 95–107)
CO2 SERPL-SCNC: 29 MEQ/L (ref 20–31)
CREAT SERPL-MCNC: 0.54 MG/DL (ref 0.7–1.2)
CREAT UR-MCNC: 32.9 MG/DL
GLUCOSE SERPL-MCNC: 122 MG/DL (ref 70–99)
INR PPP: 1.7
MICROALBUMIN UR-MCNC: <1.2 MG/DL
MICROALBUMIN/CREAT UR-RTO: NORMAL MG/G (ref 0–30)
POTASSIUM SERPL-SCNC: 4 MEQ/L (ref 3.4–4.9)
PROTHROMBIN TIME: 20.3 SEC (ref 12.3–14.9)
SODIUM SERPL-SCNC: 140 MEQ/L (ref 135–144)

## 2024-08-04 LAB
ESTIMATED AVERAGE GLUCOSE: 154 MG/DL
HBA1C MFR BLD: 7 % (ref 4–6)

## 2024-09-26 DIAGNOSIS — I82.409 RECURRENT ACUTE DEEP VEIN THROMBOSIS (DVT) OF LOWER EXTREMITY, UNSPECIFIED LATERALITY (HCC): ICD-10-CM

## 2024-09-26 DIAGNOSIS — I63.89 CEREBROVASCULAR ACCIDENT (CVA) DUE TO OTHER MECHANISM (HCC): ICD-10-CM

## 2024-09-26 LAB
INR PPP: 2.4
PROTHROMBIN TIME: 26 SEC (ref 12.3–14.9)

## 2024-10-02 ENCOUNTER — TELEPHONE (OUTPATIENT)
Dept: PRIMARY CARE CLINIC | Age: 49
End: 2024-10-02

## 2024-10-02 NOTE — TELEPHONE ENCOUNTER
Called patient, he states he is fine, sees all other doctors and it's been 2 years since seen so told him he would need to be seen for refills after he told me he needed a refill. He said welll between you and me I can call him and get my refill lol I said you will need to be seen now for refills so give us a call when you do

## 2024-10-28 ENCOUNTER — OFFICE VISIT (OUTPATIENT)
Dept: ENDOCRINOLOGY | Age: 49
End: 2024-10-28
Payer: MEDICARE

## 2024-10-28 VITALS
BODY MASS INDEX: 27.4 KG/M2 | HEIGHT: 69 IN | WEIGHT: 185 LBS | OXYGEN SATURATION: 96 % | SYSTOLIC BLOOD PRESSURE: 147 MMHG | DIASTOLIC BLOOD PRESSURE: 90 MMHG

## 2024-10-28 DIAGNOSIS — E10.9 TYPE 1 DIABETES MELLITUS WITHOUT COMPLICATION (HCC): Primary | ICD-10-CM

## 2024-10-28 DIAGNOSIS — I10 PRIMARY HYPERTENSION: ICD-10-CM

## 2024-10-28 DIAGNOSIS — Z96.41 INSULIN PUMP IN PLACE: ICD-10-CM

## 2024-10-28 LAB
CHP ED QC CHECK: NORMAL
GLUCOSE BLD-MCNC: 157 MG/DL

## 2024-10-28 PROCEDURE — 82962 GLUCOSE BLOOD TEST: CPT | Performed by: INTERNAL MEDICINE

## 2024-10-28 PROCEDURE — 99213 OFFICE O/P EST LOW 20 MIN: CPT | Performed by: INTERNAL MEDICINE

## 2024-10-28 PROCEDURE — 3051F HG A1C>EQUAL 7.0%<8.0%: CPT | Performed by: INTERNAL MEDICINE

## 2024-10-28 PROCEDURE — 3080F DIAST BP >= 90 MM HG: CPT | Performed by: INTERNAL MEDICINE

## 2024-10-28 PROCEDURE — 3077F SYST BP >= 140 MM HG: CPT | Performed by: INTERNAL MEDICINE

## 2024-10-28 RX ORDER — INSULIN LISPRO 100 [IU]/ML
INJECTION, SOLUTION INTRAVENOUS; SUBCUTANEOUS
Qty: 60 ML | Refills: 3 | Status: SHIPPED | OUTPATIENT
Start: 2024-10-28

## 2024-10-28 RX ORDER — DAPAGLIFLOZIN 10 MG/1
10 TABLET, FILM COATED ORAL EVERY MORNING
Qty: 90 TABLET | Refills: 3 | Status: SHIPPED | OUTPATIENT
Start: 2024-10-28

## 2024-10-28 RX ORDER — LISINOPRIL 20 MG/1
20 TABLET ORAL DAILY
Qty: 30 TABLET | Refills: 3 | Status: SHIPPED | OUTPATIENT
Start: 2024-10-28

## 2024-10-28 NOTE — PROGRESS NOTES
10/28/2024    Assessment:       Diagnosis Orders   1. Type 1 diabetes mellitus without complication (HCC)  POCT Glucose    Hemoglobin A1C    Basic Metabolic Panel    Lipid Panel    HM DIABETES FOOT EXAM    dapagliflozin (FARXIGA) 10 MG tablet    insulin lispro (HUMALOG,ADMELOG) 100 UNIT/ML SOLN injection vial      2. Primary hypertension  lisinopril (PRINIVIL;ZESTRIL) 20 MG tablet      3. Insulin pump in place              PLAN:     Orders Placed This Encounter   Procedures    Hemoglobin A1C     Standing Status:   Future     Standing Expiration Date:   10/28/2025    Basic Metabolic Panel     Standing Status:   Future     Standing Expiration Date:   10/28/2025    Lipid Panel     Standing Status:   Future     Standing Expiration Date:   10/28/2025    POCT Glucose    HM DIABETES FOOT EXAM     Orders Placed This Encounter   Medications    lisinopril (PRINIVIL;ZESTRIL) 20 MG tablet     Sig: Take 1 tablet by mouth daily     Dispense:  30 tablet     Refill:  3    dapagliflozin (FARXIGA) 10 MG tablet     Sig: Take 1 tablet by mouth every morning     Dispense:  90 tablet     Refill:  3    insulin lispro (HUMALOG,ADMELOG) 100 UNIT/ML SOLN injection vial     Sig: INJECT SUBCUTANEOUSLY PER DAY VIA PUMP. MAX  UNITS PER DAY     Dispense:  60 mL     Refill:  3         Orders Placed This Encounter   Procedures    POCT Glucose     No orders of the defined types were placed in this encounter.    No follow-ups on file.  Subjective:     Chief Complaint   Patient presents with    Diabetes    insulin pump      Vitals:    10/28/24 1555 10/28/24 1558   BP: (!) 147/90 (!) 147/90   SpO2: 96%    Weight: 83.9 kg (185 lb)    Height: 1.753 m (5' 9\")      Wt Readings from Last 3 Encounters:   10/28/24 83.9 kg (185 lb)   01/29/24 82.6 kg (182 lb)   07/05/23 80.3 kg (177 lb)     BP Readings from Last 3 Encounters:   10/28/24 (!) 147/90   01/29/24 (!) 140/90   07/05/23 (!) 147/90       Follow-up on type 1 diabetes patient on [employer] insulin

## 2024-11-16 DIAGNOSIS — I82.409 RECURRENT ACUTE DEEP VEIN THROMBOSIS (DVT) OF LOWER EXTREMITY, UNSPECIFIED LATERALITY (HCC): ICD-10-CM

## 2024-11-16 DIAGNOSIS — I63.89 CEREBROVASCULAR ACCIDENT (CVA) DUE TO OTHER MECHANISM (HCC): ICD-10-CM

## 2024-11-16 LAB
INR PPP: 2.3
PROTHROMBIN TIME: 25.1 SEC (ref 12.3–14.9)

## 2024-11-29 DIAGNOSIS — I63.89 CEREBROVASCULAR ACCIDENT (CVA) DUE TO OTHER MECHANISM (HCC): ICD-10-CM

## 2024-11-29 RX ORDER — WARFARIN SODIUM 5 MG/1
TABLET ORAL
Qty: 180 TABLET | Refills: 0 | Status: SHIPPED | OUTPATIENT
Start: 2024-11-29

## 2024-12-06 ENCOUNTER — ANESTHESIA (OUTPATIENT)
Dept: ENDOSCOPY | Age: 49
End: 2024-12-06
Payer: MEDICARE

## 2024-12-06 ENCOUNTER — APPOINTMENT (OUTPATIENT)
Dept: GENERAL RADIOLOGY | Age: 49
DRG: 377 | End: 2024-12-06
Attending: INTERNAL MEDICINE
Payer: MEDICARE

## 2024-12-06 ENCOUNTER — APPOINTMENT (OUTPATIENT)
Dept: GENERAL RADIOLOGY | Age: 49
DRG: 377 | End: 2024-12-06
Payer: MEDICARE

## 2024-12-06 ENCOUNTER — HOSPITAL ENCOUNTER (INPATIENT)
Age: 49
LOS: 4 days | Discharge: HOME | DRG: 377 | End: 2024-12-10
Admitting: INTERNAL MEDICINE
Payer: MEDICARE

## 2024-12-06 ENCOUNTER — APPOINTMENT (OUTPATIENT)
Dept: CT IMAGING | Age: 49
DRG: 377 | End: 2024-12-06
Payer: MEDICARE

## 2024-12-06 ENCOUNTER — APPOINTMENT (OUTPATIENT)
Dept: ULTRASOUND IMAGING | Age: 49
DRG: 377 | End: 2024-12-06
Payer: MEDICARE

## 2024-12-06 ENCOUNTER — ANESTHESIA EVENT (OUTPATIENT)
Dept: ENDOSCOPY | Age: 49
End: 2024-12-06
Payer: MEDICARE

## 2024-12-06 DIAGNOSIS — R60.0 BILATERAL LEG EDEMA: ICD-10-CM

## 2024-12-06 DIAGNOSIS — K92.2 UPPER GI BLEED: ICD-10-CM

## 2024-12-06 DIAGNOSIS — I42.9 CARDIOMYOPATHY, UNSPECIFIED TYPE (HCC): ICD-10-CM

## 2024-12-06 DIAGNOSIS — K92.1 MELENA: Primary | ICD-10-CM

## 2024-12-06 DIAGNOSIS — I95.9 HYPOTENSION, UNSPECIFIED HYPOTENSION TYPE: ICD-10-CM

## 2024-12-06 DIAGNOSIS — D68.59 HYPERCOAGULABLE STATE (HCC): ICD-10-CM

## 2024-12-06 PROBLEM — K26.9 DUODENAL ULCER: Status: ACTIVE | Noted: 2024-12-06

## 2024-12-06 LAB
ABO/RH: NORMAL
ALBUMIN SERPL-MCNC: 3.6 G/DL (ref 3.5–4.6)
ALBUMIN SERPL-MCNC: 4.4 G/DL (ref 3.5–4.6)
ALP SERPL-CCNC: 34 U/L (ref 35–104)
ALP SERPL-CCNC: 49 U/L (ref 35–104)
ALT SERPL-CCNC: 14 U/L (ref 0–41)
ALT SERPL-CCNC: 22 U/L (ref 0–41)
AMPHET UR QL SCN: NORMAL
ANION GAP SERPL CALCULATED.3IONS-SCNC: 15 MEQ/L (ref 9–15)
ANION GAP SERPL CALCULATED.3IONS-SCNC: 9 MEQ/L (ref 9–15)
ANTIBODY SCREEN: NORMAL
APTT PPP: 28.8 SEC (ref 24.4–36.8)
APTT PPP: 30.7 SEC (ref 24.4–36.8)
AST SERPL-CCNC: 12 U/L (ref 0–40)
AST SERPL-CCNC: 18 U/L (ref 0–40)
BARBITURATES UR QL SCN: NORMAL
BASE EXCESS ARTERIAL: -6 (ref -3–3)
BASOPHILS # BLD: 0 K/UL (ref 0–0.2)
BASOPHILS NFR BLD: 0.1 %
BASOPHILS NFR BLD: 0.1 %
BASOPHILS NFR BLD: 0.2 %
BENZODIAZ UR QL SCN: NORMAL
BILIRUB SERPL-MCNC: 0.4 MG/DL (ref 0.2–0.7)
BILIRUB SERPL-MCNC: <0.2 MG/DL (ref 0.2–0.7)
BILIRUB UR QL STRIP: NEGATIVE
BLOOD BANK DISPENSE STATUS: NORMAL
BLOOD BANK PRODUCT CODE: NORMAL
BPU ID: NORMAL
BUN SERPL-MCNC: 38 MG/DL (ref 6–20)
BUN SERPL-MCNC: 38 MG/DL (ref 6–20)
CALCIUM IONIZED: 1.22 MMOL/L (ref 1.12–1.32)
CALCIUM SERPL-MCNC: 7.8 MG/DL (ref 8.5–9.9)
CALCIUM SERPL-MCNC: 9 MG/DL (ref 8.5–9.9)
CANNABINOIDS UR QL SCN: NORMAL
CHLORIDE SERPL-SCNC: 107 MEQ/L (ref 95–107)
CHLORIDE SERPL-SCNC: 99 MEQ/L (ref 95–107)
CHP ED QC CHECK: YES
CLARITY UR: CLEAR
CO2 SERPL-SCNC: 21 MEQ/L (ref 20–31)
CO2 SERPL-SCNC: 23 MEQ/L (ref 20–31)
COCAINE UR QL SCN: NORMAL
COLOR UR: YELLOW
CREAT SERPL-MCNC: 0.59 MG/DL (ref 0.7–1.2)
CREAT SERPL-MCNC: 0.82 MG/DL (ref 0.7–1.2)
DESCRIPTION BLOOD BANK: NORMAL
DRUG SCREEN COMMENT UR-IMP: NORMAL
EKG ATRIAL RATE: 121 BPM
EKG P AXIS: 60 DEGREES
EKG P-R INTERVAL: 146 MS
EKG Q-T INTERVAL: 326 MS
EKG QRS DURATION: 80 MS
EKG QTC CALCULATION (BAZETT): 462 MS
EKG R AXIS: 65 DEGREES
EKG T AXIS: 65 DEGREES
EKG VENTRICULAR RATE: 121 BPM
EOSINOPHIL # BLD: 0 K/UL (ref 0–0.7)
EOSINOPHIL NFR BLD: 0 %
EOSINOPHIL NFR BLD: 0 %
EOSINOPHIL NFR BLD: 0.1 %
ERYTHROCYTE [DISTWIDTH] IN BLOOD BY AUTOMATED COUNT: 12.7 % (ref 11.5–14.5)
ERYTHROCYTE [DISTWIDTH] IN BLOOD BY AUTOMATED COUNT: 13 % (ref 11.5–14.5)
ERYTHROCYTE [DISTWIDTH] IN BLOOD BY AUTOMATED COUNT: 13.1 % (ref 11.5–14.5)
ETHANOL PERCENT: NORMAL G/DL
ETHANOL PERCENT: NORMAL G/DL
ETHANOLAMINE SERPL-MCNC: <10 MG/DL (ref 0–0.08)
ETHANOLAMINE SERPL-MCNC: <10 MG/DL (ref 0–0.08)
FENTANYL SCREEN, URINE: NORMAL
GLOBULIN SER CALC-MCNC: 1.5 G/DL (ref 2.3–3.5)
GLOBULIN SER CALC-MCNC: 2.5 G/DL (ref 2.3–3.5)
GLUCOSE BLD-MCNC: 255 MG/DL (ref 70–99)
GLUCOSE BLD-MCNC: 266 MG/DL (ref 70–99)
GLUCOSE BLD-MCNC: 286 MG/DL (ref 70–99)
GLUCOSE BLD-MCNC: 289 MG/DL (ref 70–99)
GLUCOSE BLD-MCNC: 295 MG/DL
GLUCOSE BLD-MCNC: 295 MG/DL (ref 70–99)
GLUCOSE BLD-MCNC: 322 MG/DL (ref 70–99)
GLUCOSE SERPL-MCNC: 264 MG/DL (ref 70–99)
GLUCOSE SERPL-MCNC: 330 MG/DL (ref 70–99)
GLUCOSE UR STRIP-MCNC: >=1000 MG/DL
HCO3 ARTERIAL: 19.2 MMOL/L (ref 21–29)
HCT VFR BLD AUTO: 23 % (ref 41–53)
HCT VFR BLD AUTO: 24.8 % (ref 42–52)
HCT VFR BLD AUTO: 27.5 % (ref 42–52)
HCT VFR BLD AUTO: 27.8 % (ref 42–52)
HCT VFR BLD AUTO: 29.4 % (ref 42–52)
HCT VFR BLD AUTO: 37.9 % (ref 42–52)
HGB BLD CALC-MCNC: 7.7 GM/DL (ref 13.5–17.5)
HGB BLD-MCNC: 10.3 G/DL (ref 14–18)
HGB BLD-MCNC: 13.2 G/DL (ref 14–18)
HGB BLD-MCNC: 9 G/DL (ref 14–18)
HGB BLD-MCNC: 9.4 G/DL (ref 14–18)
HGB BLD-MCNC: 9.5 G/DL (ref 14–18)
HGB UR QL STRIP: NEGATIVE
INR PPP: 2.5
INR PPP: 2.6
INR PPP: 3.3
KETONES UR STRIP-MCNC: >=80 MG/DL
LACTATE BLDV-SCNC: 2.6 MMOL/L (ref 0.5–2.2)
LACTATE BLDV-SCNC: 4.2 MMOL/L (ref 0.5–2.2)
LACTATE: 3.36 MMOL/L (ref 0.4–2)
LEUKOCYTE ESTERASE UR QL STRIP: NEGATIVE
LIPASE SERPL-CCNC: 27 U/L (ref 12–95)
LYMPHOCYTES # BLD: 1.7 K/UL (ref 1–4.8)
LYMPHOCYTES # BLD: 1.8 K/UL (ref 1–4.8)
LYMPHOCYTES # BLD: 2.8 K/UL (ref 1–4.8)
LYMPHOCYTES NFR BLD: 15.9 %
LYMPHOCYTES NFR BLD: 19.2 %
LYMPHOCYTES NFR BLD: 27.4 %
MAGNESIUM SERPL-MCNC: 2.5 MG/DL (ref 1.7–2.4)
MCH RBC QN AUTO: 32.1 PG (ref 27–31.3)
MCH RBC QN AUTO: 32.7 PG (ref 27–31.3)
MCH RBC QN AUTO: 32.8 PG (ref 27–31.3)
MCHC RBC AUTO-ENTMCNC: 33.8 % (ref 33–37)
MCHC RBC AUTO-ENTMCNC: 34.8 % (ref 33–37)
MCHC RBC AUTO-ENTMCNC: 35 % (ref 33–37)
MCV RBC AUTO: 93.6 FL (ref 79–92.2)
MCV RBC AUTO: 93.8 FL (ref 79–92.2)
MCV RBC AUTO: 94.9 FL (ref 79–92.2)
METHADONE UR QL SCN: NORMAL
MONOCYTES # BLD: 0.6 K/UL (ref 0.2–0.8)
MONOCYTES # BLD: 0.7 K/UL (ref 0.2–0.8)
MONOCYTES # BLD: 0.8 K/UL (ref 0.2–0.8)
MONOCYTES NFR BLD: 6.1 %
MONOCYTES NFR BLD: 6.9 %
MONOCYTES NFR BLD: 7.8 %
NEUTROPHILS # BLD: 6.5 K/UL (ref 1.4–6.5)
NEUTROPHILS # BLD: 6.6 K/UL (ref 1.4–6.5)
NEUTROPHILS # BLD: 9 K/UL (ref 1.4–6.5)
NEUTS SEG NFR BLD: 64.2 %
NEUTS SEG NFR BLD: 73.4 %
NEUTS SEG NFR BLD: 77.4 %
NITRITE UR QL STRIP: NEGATIVE
O2 SAT, ARTERIAL: 95 % (ref 93–100)
OPIATES UR QL SCN: NORMAL
OXYCODONE UR QL SCN: NORMAL
PCO2 ARTERIAL: 33 MM HG (ref 35–45)
PCP UR QL SCN: NORMAL
PERFORMED ON: ABNORMAL
PH ARTERIAL: 7.38 (ref 7.35–7.45)
PH UR STRIP: 6 [PH] (ref 5–9)
PLATELET # BLD AUTO: 208 K/UL (ref 130–400)
PLATELET # BLD AUTO: 245 K/UL (ref 130–400)
PLATELET # BLD AUTO: 309 K/UL (ref 130–400)
PO2 ARTERIAL: 74 MM HG (ref 75–108)
POC CHLORIDE: 106 MEQ/L (ref 99–110)
POC CREATININE: 0.7 MG/DL (ref 0.8–1.3)
POC FIO2: 21
POC SAMPLE TYPE: ABNORMAL
POTASSIUM SERPL-SCNC: 3.8 MEQ/L (ref 3.5–5.1)
POTASSIUM SERPL-SCNC: 4.5 MEQ/L (ref 3.4–4.9)
POTASSIUM SERPL-SCNC: 4.8 MEQ/L (ref 3.4–4.9)
PROPOXYPH UR QL SCN: NORMAL
PROT SERPL-MCNC: 5.1 G/DL (ref 6.3–8)
PROT SERPL-MCNC: 6.9 G/DL (ref 6.3–8)
PROT UR STRIP-MCNC: NEGATIVE MG/DL
PROTHROMBIN TIME: 26.7 SEC (ref 12.3–14.9)
PROTHROMBIN TIME: 27.7 SEC (ref 12.3–14.9)
PROTHROMBIN TIME: 33 SEC (ref 12.3–14.9)
RBC # BLD AUTO: 2.93 M/UL (ref 4.7–6.1)
RBC # BLD AUTO: 3.14 M/UL (ref 4.7–6.1)
RBC # BLD AUTO: 4.04 M/UL (ref 4.7–6.1)
REASON FOR REJECTION: NORMAL
REJECTED TEST: NORMAL
SODIUM BLD-SCNC: 140 MEQ/L (ref 136–145)
SODIUM SERPL-SCNC: 137 MEQ/L (ref 135–144)
SODIUM SERPL-SCNC: 137 MEQ/L (ref 135–144)
SP GR UR STRIP: 1.05 (ref 1–1.03)
TCO2 ARTERIAL: 20 MMOL/L (ref 21–32)
TROPONIN, HIGH SENSITIVITY: 14 NG/L (ref 0–19)
TROPONIN, HIGH SENSITIVITY: 17 NG/L (ref 0–19)
URINE REFLEX TO CULTURE: ABNORMAL
UROBILINOGEN UR STRIP-ACNC: 0.2 E.U./DL
WBC # BLD AUTO: 10.1 K/UL (ref 4.8–10.8)
WBC # BLD AUTO: 11.6 K/UL (ref 4.8–10.8)
WBC # BLD AUTO: 9 K/UL (ref 4.8–10.8)

## 2024-12-06 PROCEDURE — 6360000002 HC RX W HCPCS: Performed by: ANESTHESIOLOGIST ASSISTANT

## 2024-12-06 PROCEDURE — 6370000000 HC RX 637 (ALT 250 FOR IP): Performed by: NURSE PRACTITIONER

## 2024-12-06 PROCEDURE — 82330 ASSAY OF CALCIUM: CPT

## 2024-12-06 PROCEDURE — P9059 PLASMA, FRZ BETWEEN 8-24HOUR: HCPCS

## 2024-12-06 PROCEDURE — 86923 COMPATIBILITY TEST ELECTRIC: CPT

## 2024-12-06 PROCEDURE — 3609017100 HC EGD: Performed by: INTERNAL MEDICINE

## 2024-12-06 PROCEDURE — 6370000000 HC RX 637 (ALT 250 FOR IP): Performed by: INTERNAL MEDICINE

## 2024-12-06 PROCEDURE — 80307 DRUG TEST PRSMV CHEM ANLYZR: CPT

## 2024-12-06 PROCEDURE — P9016 RBC LEUKOCYTES REDUCED: HCPCS

## 2024-12-06 PROCEDURE — 6360000002 HC RX W HCPCS: Performed by: INTERNAL MEDICINE

## 2024-12-06 PROCEDURE — 96375 TX/PRO/DX INJ NEW DRUG ADDON: CPT

## 2024-12-06 PROCEDURE — 2580000003 HC RX 258

## 2024-12-06 PROCEDURE — 71045 X-RAY EXAM CHEST 1 VIEW: CPT

## 2024-12-06 PROCEDURE — 36430 TRANSFUSION BLD/BLD COMPNT: CPT

## 2024-12-06 PROCEDURE — 3700000000 HC ANESTHESIA ATTENDED CARE: Performed by: INTERNAL MEDICINE

## 2024-12-06 PROCEDURE — 84132 ASSAY OF SERUM POTASSIUM: CPT

## 2024-12-06 PROCEDURE — 83735 ASSAY OF MAGNESIUM: CPT

## 2024-12-06 PROCEDURE — 85025 COMPLETE CBC W/AUTO DIFF WBC: CPT

## 2024-12-06 PROCEDURE — 86900 BLOOD TYPING SEROLOGIC ABO: CPT

## 2024-12-06 PROCEDURE — 0W3P8ZZ CONTROL BLEEDING IN GASTROINTESTINAL TRACT, VIA NATURAL OR ARTIFICIAL OPENING ENDOSCOPIC: ICD-10-PCS | Performed by: STUDENT IN AN ORGANIZED HEALTH CARE EDUCATION/TRAINING PROGRAM

## 2024-12-06 PROCEDURE — 43255 EGD CONTROL BLEEDING ANY: CPT | Performed by: INTERNAL MEDICINE

## 2024-12-06 PROCEDURE — 2580000003 HC RX 258: Performed by: INTERNAL MEDICINE

## 2024-12-06 PROCEDURE — 2000000000 HC ICU R&B

## 2024-12-06 PROCEDURE — 99291 CRITICAL CARE FIRST HOUR: CPT | Performed by: INTERNAL MEDICINE

## 2024-12-06 PROCEDURE — 30233N1 TRANSFUSION OF NONAUTOLOGOUS RED BLOOD CELLS INTO PERIPHERAL VEIN, PERCUTANEOUS APPROACH: ICD-10-PCS | Performed by: INTERNAL MEDICINE

## 2024-12-06 PROCEDURE — 85610 PROTHROMBIN TIME: CPT

## 2024-12-06 PROCEDURE — 86901 BLOOD TYPING SEROLOGIC RH(D): CPT

## 2024-12-06 PROCEDURE — 84484 ASSAY OF TROPONIN QUANT: CPT

## 2024-12-06 PROCEDURE — 36600 WITHDRAWAL OF ARTERIAL BLOOD: CPT

## 2024-12-06 PROCEDURE — 82435 ASSAY OF BLOOD CHLORIDE: CPT

## 2024-12-06 PROCEDURE — 93005 ELECTROCARDIOGRAM TRACING: CPT

## 2024-12-06 PROCEDURE — 6360000004 HC RX CONTRAST MEDICATION

## 2024-12-06 PROCEDURE — 2580000003 HC RX 258: Performed by: NURSE PRACTITIONER

## 2024-12-06 PROCEDURE — 6360000002 HC RX W HCPCS: Performed by: NURSE PRACTITIONER

## 2024-12-06 PROCEDURE — 80053 COMPREHEN METABOLIC PANEL: CPT

## 2024-12-06 PROCEDURE — 36415 COLL VENOUS BLD VENIPUNCTURE: CPT

## 2024-12-06 PROCEDURE — 96361 HYDRATE IV INFUSION ADD-ON: CPT

## 2024-12-06 PROCEDURE — 96374 THER/PROPH/DIAG INJ IV PUSH: CPT

## 2024-12-06 PROCEDURE — 82077 ASSAY SPEC XCP UR&BREATH IA: CPT

## 2024-12-06 PROCEDURE — P9017 PLASMA 1 DONOR FRZ W/IN 8 HR: HCPCS

## 2024-12-06 PROCEDURE — 71275 CT ANGIOGRAPHY CHEST: CPT

## 2024-12-06 PROCEDURE — 99223 1ST HOSP IP/OBS HIGH 75: CPT | Performed by: NURSE PRACTITIONER

## 2024-12-06 PROCEDURE — 83605 ASSAY OF LACTIC ACID: CPT

## 2024-12-06 PROCEDURE — 83690 ASSAY OF LIPASE: CPT

## 2024-12-06 PROCEDURE — 85018 HEMOGLOBIN: CPT

## 2024-12-06 PROCEDURE — 82565 ASSAY OF CREATININE: CPT

## 2024-12-06 PROCEDURE — 82948 REAGENT STRIP/BLOOD GLUCOSE: CPT

## 2024-12-06 PROCEDURE — 82803 BLOOD GASES ANY COMBINATION: CPT

## 2024-12-06 PROCEDURE — 81003 URINALYSIS AUTO W/O SCOPE: CPT

## 2024-12-06 PROCEDURE — 99285 EMERGENCY DEPT VISIT HI MDM: CPT

## 2024-12-06 PROCEDURE — 86850 RBC ANTIBODY SCREEN: CPT

## 2024-12-06 PROCEDURE — 2709999900 HC NON-CHARGEABLE SUPPLY: Performed by: INTERNAL MEDICINE

## 2024-12-06 PROCEDURE — 85730 THROMBOPLASTIN TIME PARTIAL: CPT

## 2024-12-06 PROCEDURE — 3700000001 HC ADD 15 MINUTES (ANESTHESIA): Performed by: INTERNAL MEDICINE

## 2024-12-06 PROCEDURE — 30233K1 TRANSFUSION OF NONAUTOLOGOUS FROZEN PLASMA INTO PERIPHERAL VEIN, PERCUTANEOUS APPROACH: ICD-10-PCS | Performed by: INTERNAL MEDICINE

## 2024-12-06 PROCEDURE — 30233L1 TRANSFUSION OF NONAUTOLOGOUS FRESH PLASMA INTO PERIPHERAL VEIN, PERCUTANEOUS APPROACH: ICD-10-PCS | Performed by: INTERNAL MEDICINE

## 2024-12-06 PROCEDURE — 85014 HEMATOCRIT: CPT

## 2024-12-06 PROCEDURE — 84295 ASSAY OF SERUM SODIUM: CPT

## 2024-12-06 PROCEDURE — 6360000002 HC RX W HCPCS

## 2024-12-06 PROCEDURE — 93971 EXTREMITY STUDY: CPT

## 2024-12-06 DEVICE — INSTINCT PLUS ENDOSCOPIC CLIPPING DEVICE
Type: IMPLANTABLE DEVICE | Site: DUODENUM | Status: FUNCTIONAL
Brand: INSTINCT

## 2024-12-06 RX ORDER — SODIUM CHLORIDE, SODIUM LACTATE, POTASSIUM CHLORIDE, AND CALCIUM CHLORIDE .6; .31; .03; .02 G/100ML; G/100ML; G/100ML; G/100ML
500 INJECTION, SOLUTION INTRAVENOUS ONCE
Status: DISCONTINUED | OUTPATIENT
Start: 2024-12-06 | End: 2024-12-10 | Stop reason: HOSPADM

## 2024-12-06 RX ORDER — SODIUM CHLORIDE 9 MG/ML
INJECTION, SOLUTION INTRAVENOUS PRN
Status: DISCONTINUED | OUTPATIENT
Start: 2024-12-06 | End: 2024-12-10 | Stop reason: HOSPADM

## 2024-12-06 RX ORDER — INSULIN GLARGINE 100 [IU]/ML
10 INJECTION, SOLUTION SUBCUTANEOUS 2 TIMES DAILY
Status: DISCONTINUED | OUTPATIENT
Start: 2024-12-06 | End: 2024-12-08

## 2024-12-06 RX ORDER — 0.9 % SODIUM CHLORIDE 0.9 %
500 INTRAVENOUS SOLUTION INTRAVENOUS ONCE
Status: COMPLETED | OUTPATIENT
Start: 2024-12-06 | End: 2024-12-06

## 2024-12-06 RX ORDER — ONDANSETRON 2 MG/ML
4 INJECTION INTRAMUSCULAR; INTRAVENOUS AS NEEDED
Status: DISCONTINUED | OUTPATIENT
Start: 2024-12-06 | End: 2024-12-06

## 2024-12-06 RX ORDER — SODIUM CHLORIDE 0.9 % (FLUSH) 0.9 %
5-40 SYRINGE (ML) INJECTION PRN
Status: DISCONTINUED | OUTPATIENT
Start: 2024-12-06 | End: 2024-12-10 | Stop reason: HOSPADM

## 2024-12-06 RX ORDER — INSULIN LISPRO 100 [IU]/ML
0-16 INJECTION, SOLUTION INTRAVENOUS; SUBCUTANEOUS
Status: DISCONTINUED | OUTPATIENT
Start: 2024-12-06 | End: 2024-12-10 | Stop reason: HOSPADM

## 2024-12-06 RX ORDER — FENTANYL CITRATE 0.05 MG/ML
50 INJECTION, SOLUTION INTRAMUSCULAR; INTRAVENOUS ONCE
Status: COMPLETED | OUTPATIENT
Start: 2024-12-06 | End: 2024-12-06

## 2024-12-06 RX ORDER — PROPOFOL 10 MG/ML
INJECTION, EMULSION INTRAVENOUS
Status: DISCONTINUED | OUTPATIENT
Start: 2024-12-06 | End: 2024-12-06 | Stop reason: SDUPTHER

## 2024-12-06 RX ORDER — MORPHINE SULFATE 2 MG/ML
2 INJECTION, SOLUTION INTRAMUSCULAR; INTRAVENOUS ONCE
Status: COMPLETED | OUTPATIENT
Start: 2024-12-06 | End: 2024-12-06

## 2024-12-06 RX ORDER — 0.9 % SODIUM CHLORIDE 0.9 %
1000 INTRAVENOUS SOLUTION INTRAVENOUS ONCE
Status: COMPLETED | OUTPATIENT
Start: 2024-12-06 | End: 2024-12-06

## 2024-12-06 RX ORDER — ONDANSETRON 2 MG/ML
4 INJECTION INTRAMUSCULAR; INTRAVENOUS EVERY 6 HOURS PRN
Status: DISCONTINUED | OUTPATIENT
Start: 2024-12-06 | End: 2024-12-10 | Stop reason: HOSPADM

## 2024-12-06 RX ORDER — SODIUM CHLORIDE 0.9 % (FLUSH) 0.9 %
5-40 SYRINGE (ML) INJECTION PRN
Status: DISCONTINUED | OUTPATIENT
Start: 2024-12-06 | End: 2024-12-06 | Stop reason: HOSPADM

## 2024-12-06 RX ORDER — ACETAMINOPHEN 325 MG/1
650 TABLET ORAL EVERY 6 HOURS PRN
Status: DISCONTINUED | OUTPATIENT
Start: 2024-12-06 | End: 2024-12-10 | Stop reason: HOSPADM

## 2024-12-06 RX ORDER — GLUCAGON 1 MG/ML
1 KIT INJECTION PRN
Status: DISCONTINUED | OUTPATIENT
Start: 2024-12-06 | End: 2024-12-10 | Stop reason: HOSPADM

## 2024-12-06 RX ORDER — MORPHINE SULFATE 2 MG/ML
2 INJECTION, SOLUTION INTRAMUSCULAR; INTRAVENOUS EVERY 4 HOURS PRN
Status: DISCONTINUED | OUTPATIENT
Start: 2024-12-06 | End: 2024-12-10 | Stop reason: HOSPADM

## 2024-12-06 RX ORDER — SODIUM CHLORIDE, SODIUM LACTATE, POTASSIUM CHLORIDE, CALCIUM CHLORIDE 600; 310; 30; 20 MG/100ML; MG/100ML; MG/100ML; MG/100ML
INJECTION, SOLUTION INTRAVENOUS CONTINUOUS
Status: DISCONTINUED | OUTPATIENT
Start: 2024-12-06 | End: 2024-12-09

## 2024-12-06 RX ORDER — ONDANSETRON 2 MG/ML
4 INJECTION INTRAMUSCULAR; INTRAVENOUS ONCE
Status: COMPLETED | OUTPATIENT
Start: 2024-12-06 | End: 2024-12-06

## 2024-12-06 RX ORDER — DEXTROSE MONOHYDRATE 100 MG/ML
INJECTION, SOLUTION INTRAVENOUS CONTINUOUS PRN
Status: DISCONTINUED | OUTPATIENT
Start: 2024-12-06 | End: 2024-12-10 | Stop reason: HOSPADM

## 2024-12-06 RX ORDER — SODIUM CHLORIDE 0.9 % (FLUSH) 0.9 %
5-40 SYRINGE (ML) INJECTION EVERY 12 HOURS SCHEDULED
Status: DISCONTINUED | OUTPATIENT
Start: 2024-12-06 | End: 2024-12-10 | Stop reason: HOSPADM

## 2024-12-06 RX ORDER — SODIUM CHLORIDE 0.9 % (FLUSH) 0.9 %
5-40 SYRINGE (ML) INJECTION EVERY 12 HOURS SCHEDULED
Status: DISCONTINUED | OUTPATIENT
Start: 2024-12-06 | End: 2024-12-06 | Stop reason: HOSPADM

## 2024-12-06 RX ORDER — ACETAMINOPHEN 650 MG/1
650 SUPPOSITORY RECTAL EVERY 6 HOURS PRN
Status: DISCONTINUED | OUTPATIENT
Start: 2024-12-06 | End: 2024-12-10 | Stop reason: HOSPADM

## 2024-12-06 RX ORDER — ONDANSETRON 4 MG/1
4 TABLET, ORALLY DISINTEGRATING ORAL EVERY 8 HOURS PRN
Status: DISCONTINUED | OUTPATIENT
Start: 2024-12-06 | End: 2024-12-10 | Stop reason: HOSPADM

## 2024-12-06 RX ORDER — SODIUM CHLORIDE 9 MG/ML
INJECTION, SOLUTION INTRAVENOUS
Status: COMPLETED
Start: 2024-12-06 | End: 2024-12-06

## 2024-12-06 RX ORDER — SODIUM CHLORIDE 9 MG/ML
25 INJECTION, SOLUTION INTRAVENOUS PRN
Status: DISCONTINUED | OUTPATIENT
Start: 2024-12-06 | End: 2024-12-06 | Stop reason: HOSPADM

## 2024-12-06 RX ORDER — IOPAMIDOL 755 MG/ML
100 INJECTION, SOLUTION INTRAVASCULAR
Status: COMPLETED | OUTPATIENT
Start: 2024-12-06 | End: 2024-12-06

## 2024-12-06 RX ORDER — INSULIN LISPRO 100 [IU]/ML
0-4 INJECTION, SOLUTION INTRAVENOUS; SUBCUTANEOUS EVERY 4 HOURS
Status: DISCONTINUED | OUTPATIENT
Start: 2024-12-06 | End: 2024-12-06

## 2024-12-06 RX ADMIN — MORPHINE SULFATE 2 MG: 2 INJECTION, SOLUTION INTRAMUSCULAR; INTRAVENOUS at 18:14

## 2024-12-06 RX ADMIN — PANTOPRAZOLE SODIUM 8 MG/HR: 40 INJECTION, POWDER, FOR SOLUTION INTRAVENOUS at 16:21

## 2024-12-06 RX ADMIN — SODIUM CHLORIDE, PRESERVATIVE FREE 10 ML: 5 INJECTION INTRAVENOUS at 21:20

## 2024-12-06 RX ADMIN — MORPHINE SULFATE 2 MG: 2 INJECTION, SOLUTION INTRAMUSCULAR; INTRAVENOUS at 11:27

## 2024-12-06 RX ADMIN — INSULIN GLARGINE 10 UNITS: 100 INJECTION, SOLUTION SUBCUTANEOUS at 21:16

## 2024-12-06 RX ADMIN — PROPOFOL 20 MG: 10 INJECTION, EMULSION INTRAVENOUS at 16:04

## 2024-12-06 RX ADMIN — PANTOPRAZOLE SODIUM 8 MG/HR: 40 INJECTION, POWDER, FOR SOLUTION INTRAVENOUS at 08:42

## 2024-12-06 RX ADMIN — ONDANSETRON 4 MG: 2 INJECTION, SOLUTION INTRAMUSCULAR; INTRAVENOUS at 02:47

## 2024-12-06 RX ADMIN — SODIUM CHLORIDE 500 ML: 9 INJECTION, SOLUTION INTRAVENOUS at 01:50

## 2024-12-06 RX ADMIN — PROPOFOL 30 MG: 10 INJECTION, EMULSION INTRAVENOUS at 15:53

## 2024-12-06 RX ADMIN — PROPOFOL 30 MG: 10 INJECTION, EMULSION INTRAVENOUS at 15:59

## 2024-12-06 RX ADMIN — MORPHINE SULFATE 2 MG: 2 INJECTION, SOLUTION INTRAMUSCULAR; INTRAVENOUS at 22:33

## 2024-12-06 RX ADMIN — INSULIN LISPRO 2 UNITS: 100 INJECTION, SOLUTION INTRAVENOUS; SUBCUTANEOUS at 08:35

## 2024-12-06 RX ADMIN — SODIUM CHLORIDE, POTASSIUM CHLORIDE, SODIUM LACTATE AND CALCIUM CHLORIDE: 600; 310; 30; 20 INJECTION, SOLUTION INTRAVENOUS at 06:22

## 2024-12-06 RX ADMIN — SODIUM CHLORIDE, POTASSIUM CHLORIDE, SODIUM LACTATE AND CALCIUM CHLORIDE: 600; 310; 30; 20 INJECTION, SOLUTION INTRAVENOUS at 20:38

## 2024-12-06 RX ADMIN — INSULIN LISPRO 8 UNITS: 100 INJECTION, SOLUTION INTRAVENOUS; SUBCUTANEOUS at 16:29

## 2024-12-06 RX ADMIN — IOPAMIDOL 100 ML: 755 INJECTION, SOLUTION INTRAVENOUS at 03:13

## 2024-12-06 RX ADMIN — SODIUM CHLORIDE 500 ML: 9 INJECTION, SOLUTION INTRAVENOUS at 04:07

## 2024-12-06 RX ADMIN — SODIUM CHLORIDE, SODIUM LACTATE, POTASSIUM CHLORIDE, AND CALCIUM CHLORIDE 500 ML: .6; .31; .03; .02 INJECTION, SOLUTION INTRAVENOUS at 12:17

## 2024-12-06 RX ADMIN — FENTANYL CITRATE 50 MCG: 0.05 INJECTION, SOLUTION INTRAMUSCULAR; INTRAVENOUS at 04:19

## 2024-12-06 RX ADMIN — SODIUM CHLORIDE 250 ML: 9 INJECTION, SOLUTION INTRAVENOUS at 14:34

## 2024-12-06 RX ADMIN — PROPOFOL 20 MG: 10 INJECTION, EMULSION INTRAVENOUS at 16:07

## 2024-12-06 RX ADMIN — PHENYLEPHRINE HYDROCHLORIDE 100 MCG: 10 INJECTION INTRAVENOUS at 16:10

## 2024-12-06 RX ADMIN — PROPOFOL 20 MG: 10 INJECTION, EMULSION INTRAVENOUS at 16:02

## 2024-12-06 RX ADMIN — INSULIN LISPRO 8 UNITS: 100 INJECTION, SOLUTION INTRAVENOUS; SUBCUTANEOUS at 21:17

## 2024-12-06 RX ADMIN — PROPOFOL 60 MG: 10 INJECTION, EMULSION INTRAVENOUS at 15:54

## 2024-12-06 RX ADMIN — PHENYLEPHRINE HYDROCHLORIDE 200 MCG: 10 INJECTION INTRAVENOUS at 15:55

## 2024-12-06 RX ADMIN — PROPOFOL 20 MG: 10 INJECTION, EMULSION INTRAVENOUS at 15:57

## 2024-12-06 RX ADMIN — SODIUM CHLORIDE, POTASSIUM CHLORIDE, SODIUM LACTATE AND CALCIUM CHLORIDE: 600; 310; 30; 20 INJECTION, SOLUTION INTRAVENOUS at 14:33

## 2024-12-06 RX ADMIN — SODIUM CHLORIDE 80 MG: 9 INJECTION, SOLUTION INTRAMUSCULAR; INTRAVENOUS; SUBCUTANEOUS at 02:47

## 2024-12-06 RX ADMIN — PROPOFOL 30 MG: 10 INJECTION, EMULSION INTRAVENOUS at 16:09

## 2024-12-06 RX ADMIN — INSULIN LISPRO 8 UNITS: 100 INJECTION, SOLUTION INTRAVENOUS; SUBCUTANEOUS at 11:51

## 2024-12-06 RX ADMIN — SODIUM CHLORIDE, POTASSIUM CHLORIDE, SODIUM LACTATE AND CALCIUM CHLORIDE: 600; 310; 30; 20 INJECTION, SOLUTION INTRAVENOUS at 11:55

## 2024-12-06 RX ADMIN — PROPOFOL 20 MG: 10 INJECTION, EMULSION INTRAVENOUS at 16:00

## 2024-12-06 RX ADMIN — PROPOFOL 50 MG: 10 INJECTION, EMULSION INTRAVENOUS at 15:51

## 2024-12-06 RX ADMIN — SODIUM CHLORIDE 1000 ML: 9 INJECTION, SOLUTION INTRAVENOUS at 02:47

## 2024-12-06 RX ADMIN — INSULIN GLARGINE 10 UNITS: 100 INJECTION, SOLUTION SUBCUTANEOUS at 08:35

## 2024-12-06 ASSESSMENT — PAIN DESCRIPTION - LOCATION
LOCATION: BACK

## 2024-12-06 ASSESSMENT — PAIN SCALES - GENERAL
PAINLEVEL_OUTOF10: 4
PAINLEVEL_OUTOF10: 8
PAINLEVEL_OUTOF10: 7
PAINLEVEL_OUTOF10: 10
PAINLEVEL_OUTOF10: 7

## 2024-12-06 ASSESSMENT — LIFESTYLE VARIABLES
HOW OFTEN DO YOU HAVE A DRINK CONTAINING ALCOHOL: NEVER
HOW MANY STANDARD DRINKS CONTAINING ALCOHOL DO YOU HAVE ON A TYPICAL DAY: PATIENT DOES NOT DRINK

## 2024-12-06 NOTE — CONSENT
Informed Consent for Blood Component Transfusion Note    I have discussed with the patient the rationale for blood component transfusion; its benefits in treating or preventing fatigue, organ damage, or death; and its risk which includes mild transfusion reactions, rare risk of blood borne infection, or more serious but rare reactions. I have discussed the alternatives to transfusion, including the risk and consequences of not receiving transfusion. The patient had an opportunity to ask questions and had agreed to proceed with transfusion of blood components.    Electronically signed by TARA Bender CNP on 12/6/24 at 8:59 AM EST

## 2024-12-06 NOTE — CARE COORDINATION
Case Management Assessment  Initial Evaluation    Date/Time of Evaluation: 12/6/2024 10:15 AM  Assessment Completed by: JULIUS Martinez    If patient is discharged prior to next notation, then this note serves as note for discharge by case management.    Patient Name: Juan Allen                   YOB: 1975  Diagnosis: Melena [K92.1]  Upper GI bleed [K92.2]  Hypotension, unspecified hypotension type [I95.9]                   Date / Time: 12/6/2024  1:21 AM    Patient Admission Status: Inpatient   Readmission Risk (Low < 19, Mod (19-27), High > 27): Readmission Risk Score: 12.4    Current PCP: Jimenez Porter MD  PCP verified by CM? Yes    Chart Reviewed: Yes      History Provided by: Patient  Patient Orientation: Alert and Oriented    Patient Cognition: Alert    Hospitalization in the last 30 days (Readmission):  No    If yes, Readmission Assessment in CM Navigator will be completed.    Advance Directives:      Code Status: Full Code   Patient's Primary Decision Maker is: Legal Next of Kin    Primary Decision Maker: Tena Allen - Spouse - [phone]    Discharge Planning:    Patient lives with: Spouse/Significant Other Type of Home: House  Primary Care Giver: Self  Patient Support Systems include: Spouse/Significant Other   Current Financial resources: Medicare  Current community resources: None  Current services prior to admission: None            Current DME:              Type of Home Care services:  None    ADLS  Prior functional level: Independent in ADLs/IADLs  Current functional level: Independent in ADLs/IADLs    PT AM-PAC:   /24  OT AM-PAC:   /24    Family can provide assistance at DC: Yes  Would you like Case Management to discuss the discharge plan with any other family members/significant others, and if so, who? Yes (SPOUSE)  Plans to Return to Present Housing: Yes  Other Identified Issues/Barriers to RETURNING to current housing: MEDICAL CLEARANCE  Potential Assistance needed at

## 2024-12-06 NOTE — ACP (ADVANCE CARE PLANNING)
Advance Care Planning   Healthcare Decision Maker:    Primary Decision Maker: Tena Allen - [facility] - [phone]    Click here to complete Healthcare Decision Makers including selection of the Healthcare Decision Maker Relationship (ie \"Primary\").  Today we documented Decision Maker(s) consistent with Legal Next of Kin hierarchy.       Electronically signed by JULIUS Martinez on 12/6/2024 at 10:14 AM

## 2024-12-06 NOTE — ED TRIAGE NOTES
Patient states this morning he woke up with abd pain and tonight black stools Patient is on coumadin.

## 2024-12-06 NOTE — CONSENT
Informed Consent for Blood Component Transfusion Note    I have discussed with the patient the rationale for blood component transfusion; its benefits in treating or preventing fatigue, organ damage, or death; and its risk which includes mild transfusion reactions, rare risk of blood borne infection, or more serious but rare reactions. I have discussed the alternatives to transfusion, including the risk and consequences of not receiving transfusion. The patient had an opportunity to ask questions and had agreed to proceed with transfusion of blood components.    Electronically signed by Makenna Villafuerte DO on 12/6/24 at 6:21 AM EST

## 2024-12-06 NOTE — CONSENT
Informed Consent for Blood Component Transfusion Note    I have discussed with the patient the rationale for blood component transfusion; its benefits in treating or preventing fatigue, organ damage, or death; and its risk which includes mild transfusion reactions, rare risk of blood borne infection, or more serious but rare reactions. I have discussed the alternatives to transfusion, including the risk and consequences of not receiving transfusion. The patient had an opportunity to ask questions and had agreed to proceed with transfusion of blood components.    Electronically signed by TARA Bender CNP on 12/6/24 at 9:00 AM EST

## 2024-12-06 NOTE — H&P
quadrant. Peritoneum/Retroperitoneum: Negative. Bones/Soft Tissues: No acute osseous or body wall soft tissue abnormalities. CTA PELVIS: Aorta/Iliacs: Normal. Other: Negative. Bones/Soft Tissues: No acute osseous or body wall soft tissue abnormalities.     1. No evidence of thoracic or abdominal aortic aneurysm or dissection. 2. No source of active upper or lower GI bleeding is identified. 3. Minimal sigmoid diverticulosis.     XR CHEST PORTABLE    Result Date: 12/6/2024  EXAMINATION: ONE XRAY VIEW OF THE CHEST 12/6/2024 1:59 am COMPARISON: None. HISTORY: ORDERING SYSTEM PROVIDED HISTORY: sob TECHNOLOGIST PROVIDED HISTORY: Reason for exam:->sob What reading provider will be dictating this exam?->CRC FINDINGS: Cardiomediastinal silhouette: No cardiomegaly or obvious acute process is identified. Lungs/pleura: No acute pulmonary infiltrate, pleural effusion, or pneumothorax is identified. Other: No additional acute abnormality.     No acute cardiopulmonary process is identified by portable chest x-ray.       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

## 2024-12-06 NOTE — ANESTHESIA PRE PROCEDURE
celecoxib (CELEBREX) 100 MG capsule TAKE ONE CAPSULE BY MOUTH TWICE DAILY AS NEEDED 2/24/21   Sudeep Arriaga MD   brimonidine (ALPHAGAN) 0.2 % ophthalmic solution INSTILL ONE DROP INTO EACH EYE TWICE DAILY 10/20/19   Sudeep Arriaga MD   Insulin Pump Accessories MISC by Does not apply route    Sudeep Arriaga MD   ALPHAGAN P 0.15 % ophthalmic solution Place 1 drop into both eyes daily 10/22/14   Sudeep Arriaga MD   latanoprost (XALATAN) 0.005 % ophthalmic solution Place 1 drop into both eyes 4/28/13   Sudeep Arriaga MD       Current medications:    Current Facility-Administered Medications   Medication Dose Route Frequency Provider Last Rate Last Admin   • sodium chloride flush 0.9 % injection 5-40 mL  5-40 mL IntraVENous 2 times per day Makenna Villafuerte, DO       • sodium chloride flush 0.9 % injection 5-40 mL  5-40 mL IntraVENous PRN Makenna Villafuerte, DO       • 0.9 % sodium chloride infusion   IntraVENous PRN Makenna Villafuerte DO       • ondansetron (ZOFRAN-ODT) disintegrating tablet 4 mg  4 mg Oral Q8H PRN Makenna Villafuerte DO        Or   • ondansetron (ZOFRAN) injection 4 mg  4 mg IntraVENous Q6H PRN Makenna Villafuerte DO       • acetaminophen (TYLENOL) tablet 650 mg  650 mg Oral Q6H PRN Makenna Villafuerte, DO        Or   • acetaminophen (TYLENOL) suppository 650 mg  650 mg Rectal Q6H PRN Makenna Villafuerte, DO       • glucose chewable tablet 16 g  4 tablet Oral PRN Makenna Villafuerte, DO       • dextrose bolus 10% 125 mL  125 mL IntraVENous PRN Makenna Villafuerte DO        Or   • dextrose bolus 10% 250 mL  250 mL IntraVENous PRN Makenna Villafuerte, DO       • glucagon injection 1 mg  1 mg SubCUTAneous PRN Makenna Villafuerte, DO       • dextrose 10 % infusion   IntraVENous Continuous PRN Makenna Villafuerte DO       • insulin glargine (LANTUS) injection vial 10 Units  10 Units SubCUTAneous BID Makenna Villafuerte, DO   10 Units at 12/06/24 0835   • lactated ringers infusion   IntraVENous Continuous Makenna Villafuerte, DO

## 2024-12-06 NOTE — ED PROVIDER NOTES
[facility] ED  EMERGENCY DEPARTMENT ENCOUNTER      Pt Name: Juan Allen  MRN: [id number]  Birthdate 1975  Date of evaluation: 12/6/2024  Provider: Serge Bonilla PA-C  1:20 AM EST    CHIEF COMPLAINT       Chief Complaint   Patient presents with    Abdominal Pain     Patient states this morning he woke up with abd pain and tonight black stools.         HISTORY OF PRESENT ILLNESS   (Location/Symptom, Timing/Onset, Context/Setting, Quality, Duration, Modifying Factors, Severity)  Note limiting factors.   Juan Allen is a 49 y.o. male with past medical history of type 1 diabetes and hypercholesterolemia who presents to the emergency department with generalized abdominal pain and black stools.  Patient states that he has been having abdominal pain for the past couple of days but it worsened today.  Patient states that he has been having diarrhea all day and noticed his dark stools.  Patient states that he has been extremely lethargic and dyspneic on exertion.  Patient denies chest pain, nausea, vomiting, back pain, pain with urination, or headache.  Patient states that he does not typically have issues having bowel movements.  Patient does not think he has gotten a colonoscopy.  Patient has been taking his diabetes medications and blood thinners.  Patient denies taking iron supplements.     The history is provided by the patient, medical records and the EMS personnel.       Nursing Notes were reviewed.    REVIEW OF SYSTEMS    (2-9 systems for level 4, 10 or more for level 5)     Review of Systems   Constitutional:  Positive for fatigue.   Gastrointestinal:  Positive for abdominal pain.        Melena   Neurological:  Positive for light-headedness.       Except as noted above the remainder of the review of systems was reviewed and negative.       PAST MEDICAL HISTORY     Past Medical History:   Diagnosis Date    Chest pain of uncertain etiology 9/27/2017    Dizziness 9/28/2017    DVT of leg

## 2024-12-06 NOTE — ANESTHESIA POSTPROCEDURE EVALUATION
Department of Anesthesiology  Postprocedure Note    Patient: Juan Allen  MRN: [id number]  YOB: 1975  Date of evaluation: 12/6/2024    Procedure Summary       Date: 12/06/24 Room / Location: [facility] OR 01 / [facility]    Anesthesia Start: 1548 Anesthesia Stop:     Procedure: ESOPHAGOGASTRODUODENOSCOPY Diagnosis:       Melena      (Melena [K92.1])    Surgeons: Natanael Martines MD Responsible Provider: Dilshad Agarwal MD    Anesthesia Type: Not recorded ASA Status: Not recorded            Anesthesia Type: No value filed.    Shara Phase I:      Shara Phase II:      Anesthesia Post Evaluation    Patient location during evaluation: bedside  Patient participation: complete - patient participated  Level of consciousness: awake and awake and alert  Pain score: 0  Airway patency: patent  Nausea & Vomiting: no nausea and no vomiting  Cardiovascular status: blood pressure returned to baseline and hemodynamically stable  Respiratory status: acceptable  Hydration status: euvolemic  Pain management: adequate        No notable events documented.

## 2024-12-07 LAB
ALBUMIN SERPL-MCNC: 3.5 G/DL (ref 3.5–4.6)
ALP SERPL-CCNC: 44 U/L (ref 35–104)
ALT SERPL-CCNC: 13 U/L (ref 0–41)
ANION GAP SERPL CALCULATED.3IONS-SCNC: 12 MEQ/L (ref 9–15)
APTT PPP: 31 SEC (ref 24.4–36.8)
AST SERPL-CCNC: 16 U/L (ref 0–40)
BASOPHILS # BLD: 0 K/UL (ref 0–0.2)
BASOPHILS NFR BLD: 0.1 %
BILIRUB SERPL-MCNC: 0.4 MG/DL (ref 0.2–0.7)
BUN SERPL-MCNC: 15 MG/DL (ref 6–20)
CALCIUM SERPL-MCNC: 8.2 MG/DL (ref 8.5–9.9)
CHLORIDE SERPL-SCNC: 105 MEQ/L (ref 95–107)
CO2 SERPL-SCNC: 22 MEQ/L (ref 20–31)
CREAT SERPL-MCNC: 0.55 MG/DL (ref 0.7–1.2)
EOSINOPHIL # BLD: 0.2 K/UL (ref 0–0.7)
EOSINOPHIL NFR BLD: 1.4 %
ERYTHROCYTE [DISTWIDTH] IN BLOOD BY AUTOMATED COUNT: 14.1 % (ref 11.5–14.5)
GLOBULIN SER CALC-MCNC: 2 G/DL (ref 2.3–3.5)
GLUCOSE BLD-MCNC: 245 MG/DL (ref 70–99)
GLUCOSE BLD-MCNC: 259 MG/DL (ref 70–99)
GLUCOSE BLD-MCNC: 298 MG/DL (ref 70–99)
GLUCOSE BLD-MCNC: 325 MG/DL (ref 70–99)
GLUCOSE SERPL-MCNC: 245 MG/DL (ref 70–99)
HCT VFR BLD AUTO: 22.6 % (ref 42–52)
HCT VFR BLD AUTO: 23.5 % (ref 42–52)
HCT VFR BLD AUTO: 24.1 % (ref 42–52)
HCT VFR BLD AUTO: 24.9 % (ref 42–52)
HGB BLD-MCNC: 8 G/DL (ref 14–18)
HGB BLD-MCNC: 8.3 G/DL (ref 14–18)
HGB BLD-MCNC: 8.7 G/DL (ref 14–18)
HGB BLD-MCNC: 9.1 G/DL (ref 14–18)
INR PPP: 2.2
IRON % SATURATION: 29 % (ref 20–55)
IRON: 77 UG/DL (ref 61–157)
LYMPHOCYTES # BLD: 1.9 K/UL (ref 1–4.8)
LYMPHOCYTES NFR BLD: 18.4 %
MCH RBC QN AUTO: 32.7 PG (ref 27–31.3)
MCHC RBC AUTO-ENTMCNC: 36.5 % (ref 33–37)
MCV RBC AUTO: 89.6 FL (ref 79–92.2)
MONOCYTES # BLD: 0.6 K/UL (ref 0.2–0.8)
MONOCYTES NFR BLD: 5.3 %
NEUTROPHILS # BLD: 7.8 K/UL (ref 1.4–6.5)
NEUTS SEG NFR BLD: 74.3 %
PERFORMED ON: ABNORMAL
PLATELET # BLD AUTO: 202 K/UL (ref 130–400)
POTASSIUM SERPL-SCNC: 4.1 MEQ/L (ref 3.4–4.9)
PROT SERPL-MCNC: 5.5 G/DL (ref 6.3–8)
PROTHROMBIN TIME: 24.1 SEC (ref 12.3–14.9)
RBC # BLD AUTO: 2.78 M/UL (ref 4.7–6.1)
SODIUM SERPL-SCNC: 139 MEQ/L (ref 135–144)
TOTAL IRON BINDING CAPACITY: 269 UG/DL (ref 250–450)
UNSATURATED IRON BINDING CAPACITY: 192 UG/DL (ref 112–347)
WBC # BLD AUTO: 10.5 K/UL (ref 4.8–10.8)

## 2024-12-07 PROCEDURE — 2580000003 HC RX 258: Performed by: INTERNAL MEDICINE

## 2024-12-07 PROCEDURE — 6360000002 HC RX W HCPCS: Performed by: INTERNAL MEDICINE

## 2024-12-07 PROCEDURE — 85730 THROMBOPLASTIN TIME PARTIAL: CPT

## 2024-12-07 PROCEDURE — 99233 SBSQ HOSP IP/OBS HIGH 50: CPT | Performed by: INTERNAL MEDICINE

## 2024-12-07 PROCEDURE — 6370000000 HC RX 637 (ALT 250 FOR IP): Performed by: INTERNAL MEDICINE

## 2024-12-07 PROCEDURE — 83550 IRON BINDING TEST: CPT

## 2024-12-07 PROCEDURE — 1210000000 HC MED SURG R&B

## 2024-12-07 PROCEDURE — 6370000000 HC RX 637 (ALT 250 FOR IP): Performed by: NURSE PRACTITIONER

## 2024-12-07 PROCEDURE — 93005 ELECTROCARDIOGRAM TRACING: CPT | Performed by: INTERNAL MEDICINE

## 2024-12-07 PROCEDURE — 83540 ASSAY OF IRON: CPT

## 2024-12-07 PROCEDURE — 2580000003 HC RX 258: Performed by: NURSE PRACTITIONER

## 2024-12-07 PROCEDURE — 36415 COLL VENOUS BLD VENIPUNCTURE: CPT

## 2024-12-07 PROCEDURE — 2700000000 HC OXYGEN THERAPY PER DAY

## 2024-12-07 PROCEDURE — 85610 PROTHROMBIN TIME: CPT

## 2024-12-07 PROCEDURE — 6360000002 HC RX W HCPCS: Performed by: NURSE PRACTITIONER

## 2024-12-07 PROCEDURE — 85025 COMPLETE CBC W/AUTO DIFF WBC: CPT

## 2024-12-07 PROCEDURE — 80053 COMPREHEN METABOLIC PANEL: CPT

## 2024-12-07 PROCEDURE — 85018 HEMOGLOBIN: CPT

## 2024-12-07 PROCEDURE — 85014 HEMATOCRIT: CPT

## 2024-12-07 PROCEDURE — 99232 SBSQ HOSP IP/OBS MODERATE 35: CPT | Performed by: SPECIALIST

## 2024-12-07 RX ORDER — BRIMONIDINE TARTRATE 2 MG/ML
1 SOLUTION/ DROPS OPHTHALMIC 2 TIMES DAILY
Status: DISCONTINUED | OUTPATIENT
Start: 2024-12-07 | End: 2024-12-10 | Stop reason: HOSPADM

## 2024-12-07 RX ORDER — PANTOPRAZOLE SODIUM 40 MG/1
40 TABLET, DELAYED RELEASE ORAL
Status: DISCONTINUED | OUTPATIENT
Start: 2024-12-08 | End: 2024-12-10 | Stop reason: HOSPADM

## 2024-12-07 RX ORDER — DORZOLAMIDE HCL 20 MG/ML
1 SOLUTION/ DROPS OPHTHALMIC 3 TIMES DAILY
Status: DISCONTINUED | OUTPATIENT
Start: 2024-12-07 | End: 2024-12-10 | Stop reason: HOSPADM

## 2024-12-07 RX ORDER — SODIUM CHLORIDE, SODIUM LACTATE, POTASSIUM CHLORIDE, AND CALCIUM CHLORIDE .6; .31; .03; .02 G/100ML; G/100ML; G/100ML; G/100ML
1000 INJECTION, SOLUTION INTRAVENOUS ONCE
Status: COMPLETED | OUTPATIENT
Start: 2024-12-07 | End: 2024-12-07

## 2024-12-07 RX ORDER — INSULIN LISPRO 100 [IU]/ML
3 INJECTION, SOLUTION INTRAVENOUS; SUBCUTANEOUS
Status: DISCONTINUED | OUTPATIENT
Start: 2024-12-07 | End: 2024-12-08

## 2024-12-07 RX ORDER — GABAPENTIN 300 MG/1
600 CAPSULE ORAL 3 TIMES DAILY
Status: DISCONTINUED | OUTPATIENT
Start: 2024-12-07 | End: 2024-12-10 | Stop reason: HOSPADM

## 2024-12-07 RX ORDER — LATANOPROST 50 UG/ML
1 SOLUTION/ DROPS OPHTHALMIC DAILY
Status: DISCONTINUED | OUTPATIENT
Start: 2024-12-07 | End: 2024-12-10 | Stop reason: HOSPADM

## 2024-12-07 RX ORDER — ATORVASTATIN CALCIUM 20 MG/1
20 TABLET, FILM COATED ORAL DAILY
Status: DISCONTINUED | OUTPATIENT
Start: 2024-12-07 | End: 2024-12-10 | Stop reason: HOSPADM

## 2024-12-07 RX ORDER — LISINOPRIL 20 MG/1
20 TABLET ORAL DAILY
Status: DISCONTINUED | OUTPATIENT
Start: 2024-12-07 | End: 2024-12-10 | Stop reason: HOSPADM

## 2024-12-07 RX ORDER — TIMOLOL MALEATE 5 MG/ML
1 SOLUTION/ DROPS OPHTHALMIC 2 TIMES DAILY
Status: DISCONTINUED | OUTPATIENT
Start: 2024-12-07 | End: 2024-12-10 | Stop reason: HOSPADM

## 2024-12-07 RX ADMIN — MORPHINE SULFATE 2 MG: 2 INJECTION, SOLUTION INTRAMUSCULAR; INTRAVENOUS at 21:13

## 2024-12-07 RX ADMIN — INSULIN LISPRO 3 UNITS: 100 INJECTION, SOLUTION INTRAVENOUS; SUBCUTANEOUS at 17:23

## 2024-12-07 RX ADMIN — SODIUM CHLORIDE, POTASSIUM CHLORIDE, SODIUM LACTATE AND CALCIUM CHLORIDE: 600; 310; 30; 20 INJECTION, SOLUTION INTRAVENOUS at 16:47

## 2024-12-07 RX ADMIN — MORPHINE SULFATE 2 MG: 2 INJECTION, SOLUTION INTRAMUSCULAR; INTRAVENOUS at 06:59

## 2024-12-07 RX ADMIN — INSULIN LISPRO 8 UNITS: 100 INJECTION, SOLUTION INTRAVENOUS; SUBCUTANEOUS at 21:13

## 2024-12-07 RX ADMIN — INSULIN LISPRO 8 UNITS: 100 INJECTION, SOLUTION INTRAVENOUS; SUBCUTANEOUS at 08:28

## 2024-12-07 RX ADMIN — SODIUM CHLORIDE, PRESERVATIVE FREE 10 ML: 5 INJECTION INTRAVENOUS at 21:18

## 2024-12-07 RX ADMIN — INSULIN GLARGINE 10 UNITS: 100 INJECTION, SOLUTION SUBCUTANEOUS at 21:14

## 2024-12-07 RX ADMIN — ACETAMINOPHEN 650 MG: 325 TABLET ORAL at 09:49

## 2024-12-07 RX ADMIN — INSULIN LISPRO 12 UNITS: 100 INJECTION, SOLUTION INTRAVENOUS; SUBCUTANEOUS at 17:23

## 2024-12-07 RX ADMIN — TIMOLOL MALEATE 1 DROP: 5 SOLUTION OPHTHALMIC at 21:15

## 2024-12-07 RX ADMIN — SODIUM CHLORIDE, POTASSIUM CHLORIDE, SODIUM LACTATE AND CALCIUM CHLORIDE 1000 ML: 600; 310; 30; 20 INJECTION, SOLUTION INTRAVENOUS at 13:37

## 2024-12-07 RX ADMIN — INSULIN LISPRO 3 UNITS: 100 INJECTION, SOLUTION INTRAVENOUS; SUBCUTANEOUS at 11:24

## 2024-12-07 RX ADMIN — MORPHINE SULFATE 2 MG: 2 INJECTION, SOLUTION INTRAMUSCULAR; INTRAVENOUS at 02:34

## 2024-12-07 RX ADMIN — MORPHINE SULFATE 2 MG: 2 INJECTION, SOLUTION INTRAMUSCULAR; INTRAVENOUS at 11:09

## 2024-12-07 RX ADMIN — GABAPENTIN 600 MG: 300 CAPSULE ORAL at 13:37

## 2024-12-07 RX ADMIN — GABAPENTIN 600 MG: 300 CAPSULE ORAL at 21:14

## 2024-12-07 RX ADMIN — DORZOLAMIDE HYDROCHLORIDE 1 DROP: 20 SOLUTION OPHTHALMIC at 21:15

## 2024-12-07 RX ADMIN — INSULIN GLARGINE 10 UNITS: 100 INJECTION, SOLUTION SUBCUTANEOUS at 08:28

## 2024-12-07 RX ADMIN — INSULIN LISPRO 4 UNITS: 100 INJECTION, SOLUTION INTRAVENOUS; SUBCUTANEOUS at 11:24

## 2024-12-07 RX ADMIN — ACETAMINOPHEN 650 MG: 325 TABLET ORAL at 17:26

## 2024-12-07 RX ADMIN — SODIUM CHLORIDE, POTASSIUM CHLORIDE, SODIUM LACTATE AND CALCIUM CHLORIDE: 600; 310; 30; 20 INJECTION, SOLUTION INTRAVENOUS at 02:56

## 2024-12-07 RX ADMIN — BRIMONIDINE TARTRATE 1 DROP: 2 SOLUTION OPHTHALMIC at 21:15

## 2024-12-07 RX ADMIN — DORZOLAMIDE HYDROCHLORIDE 1 DROP: 20 SOLUTION OPHTHALMIC at 13:40

## 2024-12-07 RX ADMIN — LISINOPRIL 20 MG: 20 TABLET ORAL at 09:48

## 2024-12-07 RX ADMIN — PANTOPRAZOLE SODIUM 8 MG/HR: 40 INJECTION, POWDER, FOR SOLUTION INTRAVENOUS at 04:21

## 2024-12-07 RX ADMIN — SODIUM CHLORIDE, POTASSIUM CHLORIDE, SODIUM LACTATE AND CALCIUM CHLORIDE: 600; 310; 30; 20 INJECTION, SOLUTION INTRAVENOUS at 23:19

## 2024-12-07 RX ADMIN — SODIUM CHLORIDE, POTASSIUM CHLORIDE, SODIUM LACTATE AND CALCIUM CHLORIDE: 600; 310; 30; 20 INJECTION, SOLUTION INTRAVENOUS at 09:00

## 2024-12-07 ASSESSMENT — PAIN SCALES - GENERAL
PAINLEVEL_OUTOF10: 7
PAINLEVEL_OUTOF10: 8
PAINLEVEL_OUTOF10: 0
PAINLEVEL_OUTOF10: 8
PAINLEVEL_OUTOF10: 4
PAINLEVEL_OUTOF10: 7
PAINLEVEL_OUTOF10: 7
PAINLEVEL_OUTOF10: 4
PAINLEVEL_OUTOF10: 3
PAINLEVEL_OUTOF10: 6

## 2024-12-07 ASSESSMENT — PAIN DESCRIPTION - DESCRIPTORS
DESCRIPTORS: ACHING

## 2024-12-07 ASSESSMENT — PAIN DESCRIPTION - ORIENTATION
ORIENTATION: RIGHT;LEFT
ORIENTATION: LEFT

## 2024-12-07 ASSESSMENT — PAIN DESCRIPTION - LOCATION
LOCATION: BACK;LEG
LOCATION: LEG
LOCATION: BACK
LOCATION: LEG
LOCATION: BACK
LOCATION: HEAD

## 2024-12-07 ASSESSMENT — PAIN - FUNCTIONAL ASSESSMENT: PAIN_FUNCTIONAL_ASSESSMENT: ACTIVITIES ARE NOT PREVENTED

## 2024-12-07 ASSESSMENT — PAIN DESCRIPTION - PAIN TYPE
TYPE: CHRONIC PAIN
TYPE: CHRONIC PAIN

## 2024-12-07 NOTE — PLAN OF CARE
Problem: Chronic Conditions and Co-morbidities  Goal: Patient's chronic conditions and co-morbidity symptoms are monitored and maintained or improved  Outcome: Progressing  Flowsheets  Taken 12/7/2024 0430 by Karla Weber RN  Care Plan - Patient's Chronic Conditions and Co-Morbidity Symptoms are Monitored and Maintained or Improved: Monitor and assess patient's chronic conditions and comorbid symptoms for stability, deterioration, or improvement  Taken 12/6/2024 2000 by Eulalio Murphy RN  Care Plan - Patient's Chronic Conditions and Co-Morbidity Symptoms are Monitored and Maintained or Improved:   Monitor and assess patient's chronic conditions and comorbid symptoms for stability, deterioration, or improvement   Collaborate with multidisciplinary team to address chronic and comorbid conditions and prevent exacerbation or deterioration     Problem: Discharge Planning  Goal: Discharge to home or other facility with appropriate resources  Outcome: Progressing  Flowsheets  Taken 12/7/2024 0430 by Karla Weber RN  Discharge to home or other facility with appropriate resources: Identify barriers to discharge with patient and caregiver  Taken 12/6/2024 2000 by Eulalio Murphy RN  Discharge to home or other facility with appropriate resources: Identify barriers to discharge with patient and caregiver     Problem: ABCDS Injury Assessment  Goal: Absence of physical injury  Outcome: Progressing     Problem: Safety - Adult  Goal: Free from fall injury  Outcome: Progressing     Problem: Pain  Goal: Verbalizes/displays adequate comfort level or baseline comfort level  Outcome: Progressing  Flowsheets (Taken 12/7/2024 0400)  Verbalizes/displays adequate comfort level or baseline comfort level:   Encourage patient to monitor pain and request assistance   Assess pain using appropriate pain scale   Administer analgesics based on type and severity of pain and evaluate response

## 2024-12-08 ENCOUNTER — APPOINTMENT (OUTPATIENT)
Dept: ULTRASOUND IMAGING | Age: 49
DRG: 377 | End: 2024-12-08
Payer: MEDICARE

## 2024-12-08 LAB
ALBUMIN SERPL-MCNC: 3.5 G/DL (ref 3.5–4.6)
ALP SERPL-CCNC: 42 U/L (ref 35–104)
ALT SERPL-CCNC: 15 U/L (ref 0–41)
ANION GAP SERPL CALCULATED.3IONS-SCNC: 7 MEQ/L (ref 9–15)
AST SERPL-CCNC: 24 U/L (ref 0–40)
BASOPHILS # BLD: 0 K/UL (ref 0–0.2)
BASOPHILS NFR BLD: 0.2 %
BILIRUB SERPL-MCNC: 0.3 MG/DL (ref 0.2–0.7)
BUN SERPL-MCNC: 7 MG/DL (ref 6–20)
CALCIUM SERPL-MCNC: 8.3 MG/DL (ref 8.5–9.9)
CHLORIDE SERPL-SCNC: 106 MEQ/L (ref 95–107)
CO2 SERPL-SCNC: 27 MEQ/L (ref 20–31)
CREAT SERPL-MCNC: 0.5 MG/DL (ref 0.7–1.2)
EOSINOPHIL # BLD: 0.4 K/UL (ref 0–0.7)
EOSINOPHIL NFR BLD: 6.2 %
ERYTHROCYTE [DISTWIDTH] IN BLOOD BY AUTOMATED COUNT: 14.1 % (ref 11.5–14.5)
GLOBULIN SER CALC-MCNC: 1.9 G/DL (ref 2.3–3.5)
GLUCOSE BLD-MCNC: 226 MG/DL (ref 70–99)
GLUCOSE BLD-MCNC: 297 MG/DL (ref 70–99)
GLUCOSE BLD-MCNC: 305 MG/DL (ref 70–99)
GLUCOSE BLD-MCNC: 350 MG/DL (ref 70–99)
GLUCOSE SERPL-MCNC: 208 MG/DL (ref 70–99)
HCT VFR BLD AUTO: 22.1 % (ref 42–52)
HCT VFR BLD AUTO: 22.8 % (ref 42–52)
HCT VFR BLD AUTO: 24.5 % (ref 42–52)
HGB BLD-MCNC: 7.9 G/DL (ref 14–18)
HGB BLD-MCNC: 8.2 G/DL (ref 14–18)
HGB BLD-MCNC: 8.9 G/DL (ref 14–18)
INR PPP: 1.9
LYMPHOCYTES # BLD: 1.3 K/UL (ref 1–4.8)
LYMPHOCYTES NFR BLD: 22.8 %
MCH RBC QN AUTO: 32.5 PG (ref 27–31.3)
MCHC RBC AUTO-ENTMCNC: 35.7 % (ref 33–37)
MCV RBC AUTO: 90.9 FL (ref 79–92.2)
MONOCYTES # BLD: 0.4 K/UL (ref 0.2–0.8)
MONOCYTES NFR BLD: 6.9 %
NEUTROPHILS # BLD: 3.6 K/UL (ref 1.4–6.5)
NEUTS SEG NFR BLD: 63.4 %
PERFORMED ON: ABNORMAL
PLATELET # BLD AUTO: 157 K/UL (ref 130–400)
POTASSIUM SERPL-SCNC: 3.6 MEQ/L (ref 3.4–4.9)
PROT SERPL-MCNC: 5.4 G/DL (ref 6.3–8)
PROTHROMBIN TIME: 21.4 SEC (ref 12.3–14.9)
RBC # BLD AUTO: 2.43 M/UL (ref 4.7–6.1)
SODIUM SERPL-SCNC: 140 MEQ/L (ref 135–144)
WBC # BLD AUTO: 5.7 K/UL (ref 4.8–10.8)

## 2024-12-08 PROCEDURE — 99233 SBSQ HOSP IP/OBS HIGH 50: CPT | Performed by: INTERNAL MEDICINE

## 2024-12-08 PROCEDURE — 36415 COLL VENOUS BLD VENIPUNCTURE: CPT

## 2024-12-08 PROCEDURE — 6370000000 HC RX 637 (ALT 250 FOR IP): Performed by: INTERNAL MEDICINE

## 2024-12-08 PROCEDURE — 6360000002 HC RX W HCPCS: Performed by: INTERNAL MEDICINE

## 2024-12-08 PROCEDURE — 85018 HEMOGLOBIN: CPT

## 2024-12-08 PROCEDURE — 85025 COMPLETE CBC W/AUTO DIFF WBC: CPT

## 2024-12-08 PROCEDURE — 85610 PROTHROMBIN TIME: CPT

## 2024-12-08 PROCEDURE — 85014 HEMATOCRIT: CPT

## 2024-12-08 PROCEDURE — 80053 COMPREHEN METABOLIC PANEL: CPT

## 2024-12-08 PROCEDURE — 6370000000 HC RX 637 (ALT 250 FOR IP): Performed by: SPECIALIST

## 2024-12-08 PROCEDURE — 2580000003 HC RX 258: Performed by: INTERNAL MEDICINE

## 2024-12-08 PROCEDURE — 93970 EXTREMITY STUDY: CPT

## 2024-12-08 PROCEDURE — 1210000000 HC MED SURG R&B

## 2024-12-08 PROCEDURE — 6370000000 HC RX 637 (ALT 250 FOR IP): Performed by: NURSE PRACTITIONER

## 2024-12-08 RX ORDER — SODIUM CHLORIDE, SODIUM LACTATE, POTASSIUM CHLORIDE, AND CALCIUM CHLORIDE .6; .31; .03; .02 G/100ML; G/100ML; G/100ML; G/100ML
1000 INJECTION, SOLUTION INTRAVENOUS ONCE
Status: COMPLETED | OUTPATIENT
Start: 2024-12-08 | End: 2024-12-08

## 2024-12-08 RX ORDER — INSULIN LISPRO 100 [IU]/ML
6 INJECTION, SOLUTION INTRAVENOUS; SUBCUTANEOUS
Status: DISCONTINUED | OUTPATIENT
Start: 2024-12-08 | End: 2024-12-10 | Stop reason: HOSPADM

## 2024-12-08 RX ORDER — INSULIN GLARGINE 100 [IU]/ML
20 INJECTION, SOLUTION SUBCUTANEOUS NIGHTLY
Status: DISCONTINUED | OUTPATIENT
Start: 2024-12-09 | End: 2024-12-10 | Stop reason: HOSPADM

## 2024-12-08 RX ADMIN — INSULIN LISPRO 3 UNITS: 100 INJECTION, SOLUTION INTRAVENOUS; SUBCUTANEOUS at 09:16

## 2024-12-08 RX ADMIN — TIMOLOL MALEATE 1 DROP: 5 SOLUTION OPHTHALMIC at 09:21

## 2024-12-08 RX ADMIN — INSULIN LISPRO 4 UNITS: 100 INJECTION, SOLUTION INTRAVENOUS; SUBCUTANEOUS at 11:27

## 2024-12-08 RX ADMIN — INSULIN LISPRO 6 UNITS: 100 INJECTION, SOLUTION INTRAVENOUS; SUBCUTANEOUS at 11:27

## 2024-12-08 RX ADMIN — TIMOLOL MALEATE 1 DROP: 5 SOLUTION OPHTHALMIC at 20:06

## 2024-12-08 RX ADMIN — DORZOLAMIDE HYDROCHLORIDE 1 DROP: 20 SOLUTION OPHTHALMIC at 09:21

## 2024-12-08 RX ADMIN — SODIUM CHLORIDE, PRESERVATIVE FREE 10 ML: 5 INJECTION INTRAVENOUS at 20:33

## 2024-12-08 RX ADMIN — SODIUM CHLORIDE, POTASSIUM CHLORIDE, SODIUM LACTATE AND CALCIUM CHLORIDE 1000 ML: 600; 310; 30; 20 INJECTION, SOLUTION INTRAVENOUS at 11:29

## 2024-12-08 RX ADMIN — BRIMONIDINE TARTRATE 1 DROP: 2 SOLUTION OPHTHALMIC at 09:21

## 2024-12-08 RX ADMIN — PANTOPRAZOLE SODIUM 40 MG: 40 TABLET, DELAYED RELEASE ORAL at 06:27

## 2024-12-08 RX ADMIN — DORZOLAMIDE HYDROCHLORIDE 1 DROP: 20 SOLUTION OPHTHALMIC at 13:54

## 2024-12-08 RX ADMIN — MORPHINE SULFATE 2 MG: 2 INJECTION, SOLUTION INTRAMUSCULAR; INTRAVENOUS at 20:25

## 2024-12-08 RX ADMIN — INSULIN LISPRO 6 UNITS: 100 INJECTION, SOLUTION INTRAVENOUS; SUBCUTANEOUS at 16:50

## 2024-12-08 RX ADMIN — SODIUM CHLORIDE, PRESERVATIVE FREE 10 ML: 5 INJECTION INTRAVENOUS at 09:22

## 2024-12-08 RX ADMIN — GABAPENTIN 600 MG: 300 CAPSULE ORAL at 09:20

## 2024-12-08 RX ADMIN — MORPHINE SULFATE 2 MG: 2 INJECTION, SOLUTION INTRAMUSCULAR; INTRAVENOUS at 13:53

## 2024-12-08 RX ADMIN — INSULIN GLARGINE 10 UNITS: 100 INJECTION, SOLUTION SUBCUTANEOUS at 09:16

## 2024-12-08 RX ADMIN — BRIMONIDINE TARTRATE 1 DROP: 2 SOLUTION OPHTHALMIC at 20:06

## 2024-12-08 RX ADMIN — INSULIN LISPRO 8 UNITS: 100 INJECTION, SOLUTION INTRAVENOUS; SUBCUTANEOUS at 16:50

## 2024-12-08 RX ADMIN — DORZOLAMIDE HYDROCHLORIDE 1 DROP: 20 SOLUTION OPHTHALMIC at 20:06

## 2024-12-08 RX ADMIN — ATORVASTATIN CALCIUM 20 MG: 20 TABLET, FILM COATED ORAL at 09:20

## 2024-12-08 RX ADMIN — GABAPENTIN 600 MG: 300 CAPSULE ORAL at 20:27

## 2024-12-08 RX ADMIN — MORPHINE SULFATE 2 MG: 2 INJECTION, SOLUTION INTRAMUSCULAR; INTRAVENOUS at 02:49

## 2024-12-08 RX ADMIN — GABAPENTIN 600 MG: 300 CAPSULE ORAL at 13:54

## 2024-12-08 RX ADMIN — INSULIN LISPRO 16 UNITS: 100 INJECTION, SOLUTION INTRAVENOUS; SUBCUTANEOUS at 09:15

## 2024-12-08 RX ADMIN — MORPHINE SULFATE 2 MG: 2 INJECTION, SOLUTION INTRAMUSCULAR; INTRAVENOUS at 09:21

## 2024-12-08 RX ADMIN — SODIUM CHLORIDE, POTASSIUM CHLORIDE, SODIUM LACTATE AND CALCIUM CHLORIDE: 600; 310; 30; 20 INJECTION, SOLUTION INTRAVENOUS at 20:29

## 2024-12-08 RX ADMIN — SODIUM CHLORIDE, POTASSIUM CHLORIDE, SODIUM LACTATE AND CALCIUM CHLORIDE: 600; 310; 30; 20 INJECTION, SOLUTION INTRAVENOUS at 06:27

## 2024-12-08 RX ADMIN — INSULIN LISPRO 12 UNITS: 100 INJECTION, SOLUTION INTRAVENOUS; SUBCUTANEOUS at 21:46

## 2024-12-08 RX ADMIN — LISINOPRIL 20 MG: 20 TABLET ORAL at 09:20

## 2024-12-08 ASSESSMENT — PAIN SCALES - GENERAL
PAINLEVEL_OUTOF10: 3
PAINLEVEL_OUTOF10: 5
PAINLEVEL_OUTOF10: 8
PAINLEVEL_OUTOF10: 7
PAINLEVEL_OUTOF10: 5
PAINLEVEL_OUTOF10: 8
PAINLEVEL_OUTOF10: 4
PAINLEVEL_OUTOF10: 4
PAINLEVEL_OUTOF10: 7

## 2024-12-08 ASSESSMENT — PAIN DESCRIPTION - ORIENTATION
ORIENTATION: LOWER
ORIENTATION: RIGHT;LEFT
ORIENTATION: LEFT

## 2024-12-08 ASSESSMENT — PAIN DESCRIPTION - DESCRIPTORS
DESCRIPTORS: ACHING

## 2024-12-08 ASSESSMENT — PAIN DESCRIPTION - LOCATION
LOCATION: LEG
LOCATION: BACK;ABDOMEN
LOCATION: LEG

## 2024-12-08 ASSESSMENT — PAIN - FUNCTIONAL ASSESSMENT: PAIN_FUNCTIONAL_ASSESSMENT: ACTIVITIES ARE NOT PREVENTED

## 2024-12-08 NOTE — PLAN OF CARE
Problem: Chronic Conditions and Co-morbidities  Goal: Patient's chronic conditions and co-morbidity symptoms are monitored and maintained or improved  12/8/2024 0937 by Kathryn Carmen RN  Outcome: Progressing  12/7/2024 2345 by Ayla Flores RN  Outcome: Progressing     Problem: Discharge Planning  Goal: Discharge to home or other facility with appropriate resources  12/8/2024 0937 by Kathryn Carmen RN  Outcome: Progressing  12/7/2024 2345 by Ayla Flores RN  Outcome: Progressing     Problem: ABCDS Injury Assessment  Goal: Absence of physical injury  12/8/2024 0937 by Kathryn Carmen RN  Outcome: Progressing  12/7/2024 2345 by Ayla Flores RN  Outcome: Progressing     Problem: Safety - Adult  Goal: Free from fall injury  12/8/2024 0937 by Kathryn Carmen RN  Outcome: Progressing  12/7/2024 2345 by Ayla Flores RN  Outcome: Progressing     Problem: Pain  Goal: Verbalizes/displays adequate comfort level or baseline comfort level  12/8/2024 0937 by Kathryn Carmen RN  Outcome: Progressing  12/7/2024 2345 by Ayla Flores RN  Outcome: Progressing

## 2024-12-09 ENCOUNTER — APPOINTMENT (OUTPATIENT)
Age: 49
DRG: 377 | End: 2024-12-09
Attending: INTERNAL MEDICINE
Payer: MEDICARE

## 2024-12-09 PROBLEM — D68.59 HYPERCOAGULABLE STATE (HCC): Status: ACTIVE | Noted: 2024-12-06

## 2024-12-09 LAB
ALBUMIN SERPL-MCNC: 4.2 G/DL (ref 3.5–4.6)
ALP SERPL-CCNC: 48 U/L (ref 35–104)
ALT SERPL-CCNC: 20 U/L (ref 0–41)
ANION GAP SERPL CALCULATED.3IONS-SCNC: 10 MEQ/L (ref 9–15)
ANION GAP SERPL CALCULATED.3IONS-SCNC: 9 MEQ/L (ref 9–15)
AST SERPL-CCNC: 21 U/L (ref 0–40)
BASOPHILS # BLD: 0 K/UL (ref 0–0.2)
BASOPHILS NFR BLD: 0.2 %
BILIRUB SERPL-MCNC: 0.3 MG/DL (ref 0.2–0.7)
BUN SERPL-MCNC: 8 MG/DL (ref 6–20)
BUN SERPL-MCNC: 8 MG/DL (ref 6–20)
CALCIUM SERPL-MCNC: 8.5 MG/DL (ref 8.5–9.9)
CALCIUM SERPL-MCNC: 8.9 MG/DL (ref 8.5–9.9)
CHLORIDE SERPL-SCNC: 100 MEQ/L (ref 95–107)
CHLORIDE SERPL-SCNC: 99 MEQ/L (ref 95–107)
CO2 SERPL-SCNC: 28 MEQ/L (ref 20–31)
CO2 SERPL-SCNC: 28 MEQ/L (ref 20–31)
CREAT SERPL-MCNC: 0.5 MG/DL (ref 0.7–1.2)
CREAT SERPL-MCNC: 0.55 MG/DL (ref 0.7–1.2)
ECHO AO ROOT DIAM: 2.9 CM
ECHO AO ROOT INDEX: 1.46 CM/M2
ECHO AV AREA PEAK VELOCITY: 2.6 CM2
ECHO AV AREA VTI: 2.4 CM2
ECHO AV AREA/BSA PEAK VELOCITY: 1.3 CM2/M2
ECHO AV AREA/BSA VTI: 1.2 CM2/M2
ECHO AV MEAN GRADIENT: 3 MMHG
ECHO AV MEAN VELOCITY: 0.8 M/S
ECHO AV PEAK GRADIENT: 6 MMHG
ECHO AV PEAK VELOCITY: 1.3 M/S
ECHO AV VELOCITY RATIO: 0.85
ECHO AV VTI: 25.2 CM
ECHO BSA: 1.99 M2
ECHO EST RA PRESSURE: 3 MMHG
ECHO LA DIAMETER INDEX: 1.41 CM/M2
ECHO LA DIAMETER: 2.8 CM
ECHO LA TO AORTIC ROOT RATIO: 0.97
ECHO LA VOL A-L A2C: 30 ML (ref 18–58)
ECHO LA VOL A-L A4C: 40 ML (ref 18–58)
ECHO LA VOL MOD A2C: 28 ML (ref 18–58)
ECHO LA VOL MOD A4C: 37 ML (ref 18–58)
ECHO LA VOLUME AREA LENGTH: 38 ML
ECHO LA VOLUME INDEX A-L A2C: 15 ML/M2 (ref 16–34)
ECHO LA VOLUME INDEX A-L A4C: 20 ML/M2 (ref 16–34)
ECHO LA VOLUME INDEX AREA LENGTH: 19 ML/M2 (ref 16–34)
ECHO LA VOLUME INDEX MOD A2C: 14 ML/M2 (ref 16–34)
ECHO LA VOLUME INDEX MOD A4C: 19 ML/M2 (ref 16–34)
ECHO LV E' LATERAL VELOCITY: 13.56 CM/S
ECHO LV E' SEPTAL VELOCITY: 10.39 CM/S
ECHO LV EDV A2C: 64 ML
ECHO LV EDV A4C: 84 ML
ECHO LV EDV BP: 73 ML (ref 67–155)
ECHO LV EDV INDEX A4C: 42 ML/M2
ECHO LV EDV INDEX BP: 37 ML/M2
ECHO LV EDV NDEX A2C: 32 ML/M2
ECHO LV EJECTION FRACTION A2C: 69 %
ECHO LV EJECTION FRACTION A4C: 58 %
ECHO LV EJECTION FRACTION BIPLANE: 62 % (ref 55–100)
ECHO LV ESV A2C: 20 ML
ECHO LV ESV A4C: 35 ML
ECHO LV ESV BP: 28 ML (ref 22–58)
ECHO LV ESV INDEX A2C: 10 ML/M2
ECHO LV ESV INDEX A4C: 18 ML/M2
ECHO LV ESV INDEX BP: 14 ML/M2
ECHO LV FRACTIONAL SHORTENING: 32 % (ref 28–44)
ECHO LV INTERNAL DIMENSION DIASTOLE INDEX: 1.87 CM/M2
ECHO LV INTERNAL DIMENSION DIASTOLIC: 3.7 CM (ref 4.2–5.9)
ECHO LV INTERNAL DIMENSION SYSTOLIC INDEX: 1.26 CM/M2
ECHO LV INTERNAL DIMENSION SYSTOLIC: 2.5 CM
ECHO LV IVSD: 0.8 CM (ref 0.6–1)
ECHO LV IVSS: 1.1 CM
ECHO LV MASS 2D: 104.6 G (ref 88–224)
ECHO LV MASS INDEX 2D: 52.8 G/M2 (ref 49–115)
ECHO LV POSTERIOR WALL DIASTOLIC: 1.1 CM (ref 0.6–1)
ECHO LV POSTERIOR WALL SYSTOLIC: 2 CM
ECHO LV RELATIVE WALL THICKNESS RATIO: 0.59
ECHO LVOT AREA: 3.1 CM2
ECHO LVOT AV VTI INDEX: 0.75
ECHO LVOT DIAM: 2 CM
ECHO LVOT MEAN GRADIENT: 2 MMHG
ECHO LVOT PEAK GRADIENT: 4 MMHG
ECHO LVOT PEAK VELOCITY: 1.1 M/S
ECHO LVOT STROKE VOLUME INDEX: 29.8 ML/M2
ECHO LVOT SV: 59 ML
ECHO LVOT VTI: 18.8 CM
ECHO PV MAX VELOCITY: 1.4 M/S
ECHO PV PEAK GRADIENT: 7 MMHG
ECHO RIGHT VENTRICULAR SYSTOLIC PRESSURE (RVSP): 9 MMHG
ECHO RV INTERNAL DIMENSION: 2.7 CM
ECHO RV TAPSE: 2.2 CM (ref 1.7–?)
ECHO TV REGURGITANT MAX VELOCITY: 1.19 M/S
ECHO TV REGURGITANT PEAK GRADIENT: 6 MMHG
EOSINOPHIL # BLD: 0.4 K/UL (ref 0–0.7)
EOSINOPHIL NFR BLD: 6.1 %
ERYTHROCYTE [DISTWIDTH] IN BLOOD BY AUTOMATED COUNT: 13.8 % (ref 11.5–14.5)
ERYTHROCYTE [DISTWIDTH] IN BLOOD BY AUTOMATED COUNT: 13.8 % (ref 11.5–14.5)
GLOBULIN SER CALC-MCNC: 2.1 G/DL (ref 2.3–3.5)
GLUCOSE BLD-MCNC: 286 MG/DL (ref 70–99)
GLUCOSE BLD-MCNC: 319 MG/DL (ref 70–99)
GLUCOSE BLD-MCNC: 319 MG/DL (ref 70–99)
GLUCOSE BLD-MCNC: 348 MG/DL (ref 70–99)
GLUCOSE SERPL-MCNC: 296 MG/DL (ref 70–99)
GLUCOSE SERPL-MCNC: 305 MG/DL (ref 70–99)
HCT VFR BLD AUTO: 23.6 % (ref 42–52)
HCT VFR BLD AUTO: 25.5 % (ref 42–52)
HCT VFR BLD AUTO: 26.4 % (ref 42–52)
HGB BLD-MCNC: 8.4 G/DL (ref 14–18)
HGB BLD-MCNC: 8.9 G/DL (ref 14–18)
HGB BLD-MCNC: 9.5 G/DL (ref 14–18)
INR PPP: 1.1
LYMPHOCYTES # BLD: 1.1 K/UL (ref 1–4.8)
LYMPHOCYTES NFR BLD: 17.4 %
MCH RBC QN AUTO: 32 PG (ref 27–31.3)
MCH RBC QN AUTO: 32.9 PG (ref 27–31.3)
MCHC RBC AUTO-ENTMCNC: 34.9 % (ref 33–37)
MCHC RBC AUTO-ENTMCNC: 36 % (ref 33–37)
MCV RBC AUTO: 91.3 FL (ref 79–92.2)
MCV RBC AUTO: 91.7 FL (ref 79–92.2)
MONOCYTES # BLD: 0.5 K/UL (ref 0.2–0.8)
MONOCYTES NFR BLD: 7.8 %
NEUTROPHILS # BLD: 4.3 K/UL (ref 1.4–6.5)
NEUTS SEG NFR BLD: 67.7 %
PERFORMED ON: ABNORMAL
PLATELET # BLD AUTO: 211 K/UL (ref 130–400)
PLATELET # BLD AUTO: 225 K/UL (ref 130–400)
POTASSIUM SERPL-SCNC: 3.8 MEQ/L (ref 3.4–4.9)
POTASSIUM SERPL-SCNC: 3.8 MEQ/L (ref 3.4–4.9)
PROT SERPL-MCNC: 6.3 G/DL (ref 6.3–8)
PROTHROMBIN TIME: 14.7 SEC (ref 12.3–14.9)
RBC # BLD AUTO: 2.78 M/UL (ref 4.7–6.1)
RBC # BLD AUTO: 2.89 M/UL (ref 4.7–6.1)
SODIUM SERPL-SCNC: 136 MEQ/L (ref 135–144)
SODIUM SERPL-SCNC: 138 MEQ/L (ref 135–144)
WBC # BLD AUTO: 5.8 K/UL (ref 4.8–10.8)
WBC # BLD AUTO: 6.4 K/UL (ref 4.8–10.8)

## 2024-12-09 PROCEDURE — 85027 COMPLETE CBC AUTOMATED: CPT

## 2024-12-09 PROCEDURE — 6370000000 HC RX 637 (ALT 250 FOR IP): Performed by: SPECIALIST

## 2024-12-09 PROCEDURE — 85025 COMPLETE CBC W/AUTO DIFF WBC: CPT

## 2024-12-09 PROCEDURE — 80053 COMPREHEN METABOLIC PANEL: CPT

## 2024-12-09 PROCEDURE — 7100000010 HC PHASE II RECOVERY - FIRST 15 MIN: Performed by: INTERNAL MEDICINE

## 2024-12-09 PROCEDURE — 37191 INS ENDOVAS VENA CAVA FILTR: CPT | Performed by: INTERNAL MEDICINE

## 2024-12-09 PROCEDURE — 85610 PROTHROMBIN TIME: CPT

## 2024-12-09 PROCEDURE — 99223 1ST HOSP IP/OBS HIGH 75: CPT | Performed by: INTERNAL MEDICINE

## 2024-12-09 PROCEDURE — 99152 MOD SED SAME PHYS/QHP 5/>YRS: CPT | Performed by: INTERNAL MEDICINE

## 2024-12-09 PROCEDURE — 2709999900 HC NON-CHARGEABLE SUPPLY: Performed by: INTERNAL MEDICINE

## 2024-12-09 PROCEDURE — 06H03DZ INSERTION OF INTRALUMINAL DEVICE INTO INFERIOR VENA CAVA, PERCUTANEOUS APPROACH: ICD-10-PCS | Performed by: INTERNAL MEDICINE

## 2024-12-09 PROCEDURE — 85014 HEMATOCRIT: CPT

## 2024-12-09 PROCEDURE — 36415 COLL VENOUS BLD VENIPUNCTURE: CPT

## 2024-12-09 PROCEDURE — 2580000003 HC RX 258: Performed by: INTERNAL MEDICINE

## 2024-12-09 PROCEDURE — 6370000000 HC RX 637 (ALT 250 FOR IP): Performed by: INTERNAL MEDICINE

## 2024-12-09 PROCEDURE — 7100000011 HC PHASE II RECOVERY - ADDTL 15 MIN: Performed by: INTERNAL MEDICINE

## 2024-12-09 PROCEDURE — 6360000004 HC RX CONTRAST MEDICATION: Performed by: INTERNAL MEDICINE

## 2024-12-09 PROCEDURE — 1210000000 HC MED SURG R&B

## 2024-12-09 PROCEDURE — 85018 HEMOGLOBIN: CPT

## 2024-12-09 PROCEDURE — C1894 INTRO/SHEATH, NON-LASER: HCPCS | Performed by: INTERNAL MEDICINE

## 2024-12-09 PROCEDURE — 93306 TTE W/DOPPLER COMPLETE: CPT

## 2024-12-09 PROCEDURE — 6370000000 HC RX 637 (ALT 250 FOR IP): Performed by: STUDENT IN AN ORGANIZED HEALTH CARE EDUCATION/TRAINING PROGRAM

## 2024-12-09 PROCEDURE — 6360000002 HC RX W HCPCS: Performed by: INTERNAL MEDICINE

## 2024-12-09 PROCEDURE — C1769 GUIDE WIRE: HCPCS | Performed by: INTERNAL MEDICINE

## 2024-12-09 PROCEDURE — 6370000000 HC RX 637 (ALT 250 FOR IP): Performed by: NURSE PRACTITIONER

## 2024-12-09 PROCEDURE — C1880 VENA CAVA FILTER: HCPCS | Performed by: INTERNAL MEDICINE

## 2024-12-09 PROCEDURE — 93306 TTE W/DOPPLER COMPLETE: CPT | Performed by: INTERNAL MEDICINE

## 2024-12-09 DEVICE — FILTER VASC L49MM DIA30MM INTRO 7FR L65CM CATH L79CM VENA: Type: IMPLANTABLE DEVICE | Status: FUNCTIONAL

## 2024-12-09 RX ORDER — FENTANYL CITRATE 50 UG/ML
INJECTION, SOLUTION INTRAMUSCULAR; INTRAVENOUS PRN
Status: DISCONTINUED | OUTPATIENT
Start: 2024-12-09 | End: 2024-12-09 | Stop reason: HOSPADM

## 2024-12-09 RX ORDER — IOPAMIDOL 612 MG/ML
INJECTION, SOLUTION INTRAVASCULAR PRN
Status: DISCONTINUED | OUTPATIENT
Start: 2024-12-09 | End: 2024-12-09 | Stop reason: HOSPADM

## 2024-12-09 RX ORDER — SODIUM CHLORIDE 0.9 % (FLUSH) 0.9 %
5-40 SYRINGE (ML) INJECTION PRN
Status: DISCONTINUED | OUTPATIENT
Start: 2024-12-09 | End: 2024-12-09 | Stop reason: HOSPADM

## 2024-12-09 RX ORDER — SODIUM CHLORIDE 9 MG/ML
INJECTION, SOLUTION INTRAVENOUS PRN
Status: DISCONTINUED | OUTPATIENT
Start: 2024-12-09 | End: 2024-12-09 | Stop reason: HOSPADM

## 2024-12-09 RX ORDER — MIDAZOLAM HYDROCHLORIDE 1 MG/ML
INJECTION, SOLUTION INTRAMUSCULAR; INTRAVENOUS PRN
Status: DISCONTINUED | OUTPATIENT
Start: 2024-12-09 | End: 2024-12-09 | Stop reason: HOSPADM

## 2024-12-09 RX ORDER — SODIUM CHLORIDE 0.9 % (FLUSH) 0.9 %
5-40 SYRINGE (ML) INJECTION EVERY 12 HOURS SCHEDULED
Status: DISCONTINUED | OUTPATIENT
Start: 2024-12-09 | End: 2024-12-09 | Stop reason: HOSPADM

## 2024-12-09 RX ORDER — TIZANIDINE 2 MG/1
4 TABLET ORAL NIGHTLY
Status: DISCONTINUED | OUTPATIENT
Start: 2024-12-09 | End: 2024-12-10 | Stop reason: HOSPADM

## 2024-12-09 RX ADMIN — MORPHINE SULFATE 2 MG: 2 INJECTION, SOLUTION INTRAMUSCULAR; INTRAVENOUS at 05:41

## 2024-12-09 RX ADMIN — INSULIN LISPRO 12 UNITS: 100 INJECTION, SOLUTION INTRAVENOUS; SUBCUTANEOUS at 07:53

## 2024-12-09 RX ADMIN — PANTOPRAZOLE SODIUM 40 MG: 40 TABLET, DELAYED RELEASE ORAL at 05:40

## 2024-12-09 RX ADMIN — INSULIN LISPRO 12 UNITS: 100 INJECTION, SOLUTION INTRAVENOUS; SUBCUTANEOUS at 20:41

## 2024-12-09 RX ADMIN — SODIUM CHLORIDE, POTASSIUM CHLORIDE, SODIUM LACTATE AND CALCIUM CHLORIDE: 600; 310; 30; 20 INJECTION, SOLUTION INTRAVENOUS at 11:54

## 2024-12-09 RX ADMIN — TIMOLOL MALEATE 1 DROP: 5 SOLUTION OPHTHALMIC at 20:43

## 2024-12-09 RX ADMIN — DORZOLAMIDE HYDROCHLORIDE 1 DROP: 20 SOLUTION OPHTHALMIC at 07:57

## 2024-12-09 RX ADMIN — MORPHINE SULFATE 2 MG: 2 INJECTION, SOLUTION INTRAMUSCULAR; INTRAVENOUS at 22:27

## 2024-12-09 RX ADMIN — SODIUM CHLORIDE, POTASSIUM CHLORIDE, SODIUM LACTATE AND CALCIUM CHLORIDE: 600; 310; 30; 20 INJECTION, SOLUTION INTRAVENOUS at 03:12

## 2024-12-09 RX ADMIN — TIZANIDINE 4 MG: 2 TABLET ORAL at 20:40

## 2024-12-09 RX ADMIN — TIMOLOL MALEATE 1 DROP: 5 SOLUTION OPHTHALMIC at 07:57

## 2024-12-09 RX ADMIN — INSULIN GLARGINE 20 UNITS: 100 INJECTION, SOLUTION SUBCUTANEOUS at 20:41

## 2024-12-09 RX ADMIN — MORPHINE SULFATE 2 MG: 2 INJECTION, SOLUTION INTRAMUSCULAR; INTRAVENOUS at 17:52

## 2024-12-09 RX ADMIN — GABAPENTIN 600 MG: 300 CAPSULE ORAL at 20:40

## 2024-12-09 RX ADMIN — DORZOLAMIDE HYDROCHLORIDE 1 DROP: 20 SOLUTION OPHTHALMIC at 14:39

## 2024-12-09 RX ADMIN — INSULIN LISPRO 6 UNITS: 100 INJECTION, SOLUTION INTRAVENOUS; SUBCUTANEOUS at 11:59

## 2024-12-09 RX ADMIN — SODIUM CHLORIDE, PRESERVATIVE FREE 10 ML: 5 INJECTION INTRAVENOUS at 20:42

## 2024-12-09 RX ADMIN — MORPHINE SULFATE 2 MG: 2 INJECTION, SOLUTION INTRAMUSCULAR; INTRAVENOUS at 11:57

## 2024-12-09 RX ADMIN — LISINOPRIL 20 MG: 20 TABLET ORAL at 07:53

## 2024-12-09 RX ADMIN — GABAPENTIN 600 MG: 300 CAPSULE ORAL at 14:38

## 2024-12-09 RX ADMIN — BRIMONIDINE TARTRATE 1 DROP: 2 SOLUTION OPHTHALMIC at 20:43

## 2024-12-09 RX ADMIN — DORZOLAMIDE HYDROCHLORIDE 1 DROP: 20 SOLUTION OPHTHALMIC at 20:43

## 2024-12-09 RX ADMIN — BRIMONIDINE TARTRATE 1 DROP: 2 SOLUTION OPHTHALMIC at 07:57

## 2024-12-09 RX ADMIN — INSULIN LISPRO 8 UNITS: 100 INJECTION, SOLUTION INTRAVENOUS; SUBCUTANEOUS at 11:59

## 2024-12-09 RX ADMIN — INSULIN LISPRO 6 UNITS: 100 INJECTION, SOLUTION INTRAVENOUS; SUBCUTANEOUS at 07:54

## 2024-12-09 RX ADMIN — MORPHINE SULFATE 2 MG: 2 INJECTION, SOLUTION INTRAMUSCULAR; INTRAVENOUS at 00:33

## 2024-12-09 RX ADMIN — ATORVASTATIN CALCIUM 20 MG: 20 TABLET, FILM COATED ORAL at 07:53

## 2024-12-09 RX ADMIN — GABAPENTIN 600 MG: 300 CAPSULE ORAL at 07:53

## 2024-12-09 ASSESSMENT — PAIN DESCRIPTION - LOCATION
LOCATION: LEG
LOCATION: GENERALIZED
LOCATION: LEG
LOCATION: GENERALIZED
LOCATION: LEG;BACK

## 2024-12-09 ASSESSMENT — PAIN SCALES - GENERAL
PAINLEVEL_OUTOF10: 8
PAINLEVEL_OUTOF10: 7
PAINLEVEL_OUTOF10: 0
PAINLEVEL_OUTOF10: 3
PAINLEVEL_OUTOF10: 3
PAINLEVEL_OUTOF10: 4
PAINLEVEL_OUTOF10: 9
PAINLEVEL_OUTOF10: 7
PAINLEVEL_OUTOF10: 7
PAINLEVEL_OUTOF10: 4
PAINLEVEL_OUTOF10: 5
PAINLEVEL_OUTOF10: 7

## 2024-12-09 ASSESSMENT — PAIN - FUNCTIONAL ASSESSMENT
PAIN_FUNCTIONAL_ASSESSMENT: ACTIVITIES ARE NOT PREVENTED
PAIN_FUNCTIONAL_ASSESSMENT: ACTIVITIES ARE NOT PREVENTED

## 2024-12-09 ASSESSMENT — PAIN DESCRIPTION - DESCRIPTORS
DESCRIPTORS: ACHING;DISCOMFORT
DESCRIPTORS: ACHING;DISCOMFORT;GNAWING

## 2024-12-09 ASSESSMENT — PAIN SCALES - WONG BAKER: WONGBAKER_NUMERICALRESPONSE: NO HURT

## 2024-12-09 NOTE — INTERVAL H&P NOTE
Update History & Physical    The patient's History and Physical of December 9, 24 was reviewed with the patient and I examined the patient. There was no change. The surgical site was confirmed by the patient and me.     Plan: The risks, benefits, expected outcome, and alternative to the recommended procedure have been discussed with the patient. Patient understands and wants to proceed with the procedure.     Electronically signed by Matthew Mary DO on 12/9/2024 at 4:21 PM

## 2024-12-09 NOTE — PLAN OF CARE
Problem: Chronic Conditions and Co-morbidities  Goal: Patient's chronic conditions and co-morbidity symptoms are monitored and maintained or improved  12/9/2024 1218 by Ruba Christian RN  Outcome: Progressing  12/9/2024 1217 by Ruba Christian RN  Outcome: Progressing  12/9/2024 0305 by Gomez, Grisel, RN  Outcome: Progressing     Problem: Discharge Planning  Goal: Discharge to home or other facility with appropriate resources  12/9/2024 1218 by Ruba Christian RN  Outcome: Progressing  12/9/2024 1217 by Ruba Christian RN  Outcome: Progressing  12/9/2024 0305 by Gomez, Grisel, RN  Outcome: Progressing     Problem: ABCDS Injury Assessment  Goal: Absence of physical injury  12/9/2024 1218 by Ruba Christian RN  Outcome: Progressing  12/9/2024 1217 by Ruba Christian RN  Outcome: Progressing  12/9/2024 0305 by Gomez, Grisel, RN  Outcome: Progressing     Problem: Safety - Adult  Goal: Free from fall injury  12/9/2024 1218 by Ruba Christian RN  Outcome: Progressing  12/9/2024 0305 by Gomez, Grisel, RN  Outcome: Progressing

## 2024-12-09 NOTE — PLAN OF CARE
Problem: Chronic Conditions and Co-morbidities  Goal: Patient's chronic conditions and co-morbidity symptoms are monitored and maintained or improved  12/9/2024 1217 by Ruba Christian RN  Outcome: Progressing  12/9/2024 0305 by Gomez, Grisel, RN  Outcome: Progressing     Problem: Discharge Planning  Goal: Discharge to home or other facility with appropriate resources  12/9/2024 1217 by Ruba Christian RN  Outcome: Progressing  12/9/2024 0305 by Gomez, Grisel, RN  Outcome: Progressing     Problem: ABCDS Injury Assessment  Goal: Absence of physical injury  12/9/2024 1217 by Ruba Christian RN  Outcome: Progressing  12/9/2024 0305 by Gomez, Grisel, RN  Outcome: Progressing     Problem: Safety - Adult  Goal: Free from fall injury  12/9/2024 0305 by Gomez, Grisel, RN  Outcome: Progressing     Problem: Pain  Goal: Verbalizes/displays adequate comfort level or baseline comfort level  12/9/2024 0305 by Gomez, Grisel, RN  Outcome: Progressing

## 2024-12-09 NOTE — ONCOLOGY
upper or lower GI bleeding is identified. 3. Minimal sigmoid diverticulosis.     XR CHEST PORTABLE    Result Date: 12/6/2024  EXAMINATION: ONE XRAY VIEW OF THE CHEST 12/6/2024 1:59 am COMPARISON: None. HISTORY: ORDERING SYSTEM PROVIDED HISTORY: sob TECHNOLOGIST PROVIDED HISTORY: Reason for exam:->sob What reading provider will be dictating this exam?->CRC FINDINGS: Cardiomediastinal silhouette: No cardiomegaly or obvious acute process is identified. Lungs/pleura: No acute pulmonary infiltrate, pleural effusion, or pneumothorax is identified. Other: No additional acute abnormality.     No acute cardiopulmonary process is identified by portable chest x-ray.     ASSESSMENT AND PLAN:  Patient with acute GI bleed secondary to duodenal ulceration status post endoscopic clip placement.  The patient was on therapeutic Warfarin for remote DVT, CVA, and strong family history of thrombosis by report.  Now clinically improved and bleeding stopped clinically.    Clearly needs to hold anti-coagulation for now.  I will consult cardiology for temporary IVC filter.  Consider starting Xarelto 10 mg \"maintenance dosing\" in 2-3 weeks (when ok with GI).  Same bleed risk as ASA 81mg daily but should perevnt new thrombosis.  I will repeat thrombophilia work-up in the office.  Follow in 2 weeks.  Thanks.    Electronically signed by David Marie DO on 12/9/2024 at 8:41 AM

## 2024-12-09 NOTE — BRIEF OP NOTE
Section of Cardiology  Adult Brief Procedure Note        Procedure(s):  IVC filter placement.     Pre-operative Diagnosis:  C.I to OAC    H&P Status: Completed and reviewed.     Post-operative Diagnosis:      Successful Cook Celect IVCF placement.     Findings:  See full report    Complications:  none    Primary Proceduralist:   Dr.Wes Mary DO      Full procedure note to follow

## 2024-12-09 NOTE — H&P (VIEW-ONLY)
[place], [place]    CARDIOLOGY CONSULTATION REPORT    DATE OF CONSULTATION  12/9/2024    CONSULTANT  Israel Rush DO     REQUESTING PHYSICIAN  Bhumika Calloway MD     PRIMARY CARDIOLOGIST  None     REASON FOR CONSULTATION   IVC filter    HISTORY OF PRESENT ILLNESS  Juan Allen is a 49 y.o. male with history of DVT and CVA on chronic warfarin therapy admitted with acute GI bleed.  He is status post duodenal clip.  His INR was 2.6 on admission.  He was seen by hematology who recommended IVC filter as he will need to be off anticoagulation for the next couple weeks.  Currently, the patient denies any chest pain or shortness of breath.  He denies any recent issues with DVT or PE as he has been generally compliant with anticoagulation.     Allergies  No Known Allergies    Medications   insulin glargine, 20 Units, Nightly  insulin lispro, 6 Units, TID WC  lisinopril, 20 mg, Daily  brimonidine, 1 drop, BID  dorzolamide, 1 drop, TID  gabapentin, 600 mg, TID  latanoprost, 1 drop, Daily  atorvastatin, 20 mg, Daily  timolol, 1 drop, BID  pantoprazole, 40 mg, QAM AC  sodium chloride flush, 5-40 mL, 2 times per day  insulin lispro, 0-16 Units, 4x Daily AC & HS  lactated ringers, 500 mL, Once        Past Medical History:   Diagnosis Date    Chest pain of uncertain etiology 9/27/2017    Dizziness 9/28/2017    DVT of leg (deep venous thrombosis) (HCC)     x2    Hyperlipidemia     Hypertension     Rheumatoid arthritis (HCC)     dr [facility].    Smoker     quit 2008    SOB (shortness of breath) 9/28/2017    Stroke (HCC)     bleed    Type 1 diabetes mellitus, uncontrolled       Past Surgical History:   Procedure Laterality Date    UPPER GASTROINTESTINAL ENDOSCOPY N/A 12/6/2024    ESOPHAGOGASTRODUODENOSCOPY performed by Natanael Martines MD at [facility]      Social History     Tobacco Use    Smoking status: Former     Current packs/day: 0.00     Types: Cigarettes     Quit date: 2007     Years since

## 2024-12-09 NOTE — CARE COORDINATION
MET W/PT TO ASSESS NEEDS AND DISCUSS DISCHARGE NEEDS. PT SLEEPY AND DID NOT RESPOND TO CM. PT IS FROM HOME W/SPOUSE, INDEPENDENT OF ADL'S AT BASELINE. ON RA. ON CLEAR LIQUID DIET. RECEIVING IV PPI. PER [facility], CARDIOLOGY CONSULT FOR POSSIBLE TEMPORARY IVC FILTER. WILL FOLLOW FOR ANTICOAG NEEDS UPON DISCHARGE.

## 2024-12-09 NOTE — CONSULTS
Pulmonary and Critical Care Medicine  Consult Note  Encounter Date: 2024 9:27 AM    Mr. Juan Allen is a 49 y.o. male  : 1975  Requesting Provider: David Maldonado MD    Reason for request: ICU manage            HISTORY OF PRESENT ILLNESS:    Patient is 49 y.o. presents with abdominal pain, l mid abdomen, feels like tenderness, does not radiate to the back.  He report black tarry stool, no nausea or vomiting, no chest pain, he has fatigue, no dizziness, no syncope, he was hypotensive in ED 95/61, he is on coumadin  no alcohol use, no NSAID use no prior history of peptic ulcer disease or GI bleed          Past Medical History:        Diagnosis Date    Chest pain of uncertain etiology 2017    Dizziness 2017    DVT of leg (deep venous thrombosis) (HCC)     x2    Hyperlipidemia     Hypertension     Rheumatoid arthritis (HCC)     [place] .    Smoker     quit     SOB (shortness of breath) 2017    Stroke ([facility])     bleed    Type 1 diabetes mellitus, uncontrolled        Past Surgical History:    History reviewed. No pertinent surgical history.    Social History:     reports that he quit smoking about 17 years ago. His smoking use included cigarettes. He has been exposed to tobacco smoke. He has never used smokeless tobacco. He reports that he does not drink alcohol and does not use drugs.    Family History:       Problem Relation Age of Onset    Clotting Disorder Mother         blood clots       Allergies:  Patient has no known allergies.        MEDICATIONS during current hospitalization:    Continuous Infusions:   sodium chloride      dextrose      lactated ringers 150 mL/hr at 24 0911    pantoprazole (PROTONIX) 80 mg in sodium chloride 0.9 % 100 mL infusion 8 mg/hr (24 0911)    sodium chloride         Scheduled Meds:   sodium chloride flush  5-40 mL IntraVENous 2 times per day    insulin lispro  0-4 Units SubCUTAneous Q4H    insulin glargine  10 Units 
[place], [place]    CARDIOLOGY CONSULTATION REPORT    DATE OF CONSULTATION  12/9/2024    CONSULTANT  Israel Rush DO     REQUESTING PHYSICIAN  Bhumika Calloway MD     PRIMARY CARDIOLOGIST  None     REASON FOR CONSULTATION   IVC filter    HISTORY OF PRESENT ILLNESS  Juan Allen is a 49 y.o. male with history of DVT and CVA on chronic warfarin therapy admitted with acute GI bleed.  He is status post duodenal clip.  His INR was 2.6 on admission.  He was seen by hematology who recommended IVC filter as he will need to be off anticoagulation for the next couple weeks.  Currently, the patient denies any chest pain or shortness of breath.  He denies any recent issues with DVT or PE as he has been generally compliant with anticoagulation.     Allergies  No Known Allergies    Medications   insulin glargine, 20 Units, Nightly  insulin lispro, 6 Units, TID WC  lisinopril, 20 mg, Daily  brimonidine, 1 drop, BID  dorzolamide, 1 drop, TID  gabapentin, 600 mg, TID  latanoprost, 1 drop, Daily  atorvastatin, 20 mg, Daily  timolol, 1 drop, BID  pantoprazole, 40 mg, QAM AC  sodium chloride flush, 5-40 mL, 2 times per day  insulin lispro, 0-16 Units, 4x Daily AC & HS  lactated ringers, 500 mL, Once        Past Medical History:   Diagnosis Date    Chest pain of uncertain etiology 9/27/2017    Dizziness 9/28/2017    DVT of leg (deep venous thrombosis) (HCC)     x2    Hyperlipidemia     Hypertension     Rheumatoid arthritis (HCC)     dr [facility].    Smoker     quit 2008    SOB (shortness of breath) 9/28/2017    Stroke (HCC)     bleed    Type 1 diabetes mellitus, uncontrolled       Past Surgical History:   Procedure Laterality Date    UPPER GASTROINTESTINAL ENDOSCOPY N/A 12/6/2024    ESOPHAGOGASTRODUODENOSCOPY performed by Natanael Martines MD at [facility]      Social History     Tobacco Use    Smoking status: Former     Current packs/day: 0.00     Types: Cigarettes     Quit date: 2007     Years since 
Labs     12/06/24 0132 12/06/24 0641    137   K 4.5 4.8   CL 99 107   CO2 23 21   BUN 38* 38*   CREATININE 0.82 0.59*     Recent Labs     12/06/24  0132 12/06/24 0641   AST 18 12   ALT 22 14   BILITOT 0.4 <0.2   ALKPHOS 49 34*     Recent Labs     12/06/24  0132   LIPASE 27     Recent Labs     12/06/24  0145   INR 3.3     CTA CHEST ABDOMEN PELVIS W WO CONTRAST    Result Date: 12/6/2024  EXAMINATION: CTA DISSECTION CHEST ABDOMEN PELVIS WITHOUT AND WITH CONTRAST 12/6/2024 3:07 am: TECHNIQUE: CTA of the chest, abdomen and pelvis was performed before and after the administration of intravenous contrast as per dissection protocol. Multiplanar reformatted images are provided for review.  MIP images are provided for review. Automated exposure control, iterative reconstruction, and/or weight based adjustment of the mA/kV was utilized to reduce the radiation dose to as low as reasonably achievable. COMPARISON: None. HISTORY: ORDERING SYSTEM PROVIDED HISTORY: Abdominal pain. FOBT positive. Low blood pressure TECHNOLOGIST PROVIDED HISTORY: Reason for exam:->Abdominal pain. FOBT positive. Low blood pressure Additional Contrast?->Radiologist Recommendation What reading provider will be dictating this exam?->CRC FINDINGS: CTA CHEST: Thoracic aorta: No evidence of thoracic aortic aneurysm or dissection.  No acute abnormality of the aorta. Mediastinum: No evidence of mediastinal lymphadenopathy.  The heart and pericardium demonstrate no acute abnormality. Lungs/Pleura: The lungs are without acute process.  No focal consolidation or pulmonary edema.  No evidence of pleural effusion or pneumothorax.  Mild right basilar atelectasis. Soft Tissues/Bones: No acute bone or soft tissue abnormality. CTA ABDOMEN: Abdominal aorta/Branches: Normal caliber and contour of the abdominal aorta. The celiac, SMA, single bilateral renal arteries and OLIMPIA are widely patent. No source of active upper or lower GI bleeding is identified. Organs:

## 2024-12-10 VITALS
DIASTOLIC BLOOD PRESSURE: 68 MMHG | BODY MASS INDEX: 26.66 KG/M2 | HEIGHT: 69 IN | HEART RATE: 84 BPM | TEMPERATURE: 97.3 F | RESPIRATION RATE: 17 BRPM | OXYGEN SATURATION: 100 % | SYSTOLIC BLOOD PRESSURE: 110 MMHG | WEIGHT: 180 LBS

## 2024-12-10 PROBLEM — I95.9 HYPOTENSION: Status: RESOLVED | Noted: 2024-12-06 | Resolved: 2024-12-10

## 2024-12-10 PROBLEM — K92.2 UPPER GI BLEED: Status: RESOLVED | Noted: 2024-12-06 | Resolved: 2024-12-10

## 2024-12-10 LAB
ALBUMIN SERPL-MCNC: 4.2 G/DL (ref 3.5–4.6)
ALP SERPL-CCNC: 51 U/L (ref 35–104)
ALT SERPL-CCNC: 21 U/L (ref 0–41)
ANION GAP SERPL CALCULATED.3IONS-SCNC: 9 MEQ/L (ref 9–15)
AST SERPL-CCNC: 18 U/L (ref 0–40)
BASOPHILS # BLD: 0 K/UL (ref 0–0.2)
BASOPHILS NFR BLD: 0.3 %
BILIRUB SERPL-MCNC: <0.2 MG/DL (ref 0.2–0.7)
BUN SERPL-MCNC: 13 MG/DL (ref 6–20)
CALCIUM SERPL-MCNC: 9.1 MG/DL (ref 8.5–9.9)
CHLORIDE SERPL-SCNC: 97 MEQ/L (ref 95–107)
CO2 SERPL-SCNC: 28 MEQ/L (ref 20–31)
CREAT SERPL-MCNC: 0.57 MG/DL (ref 0.7–1.2)
EOSINOPHIL # BLD: 0.3 K/UL (ref 0–0.7)
EOSINOPHIL NFR BLD: 5 %
ERYTHROCYTE [DISTWIDTH] IN BLOOD BY AUTOMATED COUNT: 14.4 % (ref 11.5–14.5)
GLOBULIN SER CALC-MCNC: 2.2 G/DL (ref 2.3–3.5)
GLUCOSE BLD-MCNC: 328 MG/DL (ref 70–99)
GLUCOSE BLD-MCNC: 367 MG/DL (ref 70–99)
GLUCOSE SERPL-MCNC: 352 MG/DL (ref 70–99)
HCT VFR BLD AUTO: 27.6 % (ref 42–52)
HGB BLD-MCNC: 9.3 G/DL (ref 14–18)
INR PPP: 1
LYMPHOCYTES # BLD: 1.1 K/UL (ref 1–4.8)
LYMPHOCYTES NFR BLD: 14 %
MCH RBC QN AUTO: 31.6 PG (ref 27–31.3)
MCHC RBC AUTO-ENTMCNC: 33.7 % (ref 33–37)
MCV RBC AUTO: 93.9 FL (ref 79–92.2)
MONOCYTES # BLD: 0.3 K/UL (ref 0.2–0.8)
MONOCYTES NFR BLD: 4.8 %
MYELOCYTES NFR BLD MANUAL: 1 %
NEUTROPHILS # BLD: 4.9 K/UL (ref 1.4–6.5)
NEUTS SEG NFR BLD: 73 %
NRBC BLD-RTO: 2 /100 WBC
PERFORMED ON: ABNORMAL
PERFORMED ON: ABNORMAL
PLATELET # BLD AUTO: 254 K/UL (ref 130–400)
PLATELET BLD QL SMEAR: ABNORMAL
POTASSIUM SERPL-SCNC: 4.3 MEQ/L (ref 3.4–4.9)
PROT SERPL-MCNC: 6.4 G/DL (ref 6.3–8)
PROTHROMBIN TIME: 13.5 SEC (ref 12.3–14.9)
RBC # BLD AUTO: 2.94 M/UL (ref 4.7–6.1)
SLIDE REVIEW: ABNORMAL
SMUDGE CELLS BLD QL SMEAR: 12.4
SODIUM SERPL-SCNC: 134 MEQ/L (ref 135–144)
VARIANT LYMPHS NFR BLD: 2 %
WBC # BLD AUTO: 6.6 K/UL (ref 4.8–10.8)

## 2024-12-10 PROCEDURE — 2700000000 HC OXYGEN THERAPY PER DAY

## 2024-12-10 PROCEDURE — 6370000000 HC RX 637 (ALT 250 FOR IP): Performed by: SPECIALIST

## 2024-12-10 PROCEDURE — 85610 PROTHROMBIN TIME: CPT

## 2024-12-10 PROCEDURE — 36415 COLL VENOUS BLD VENIPUNCTURE: CPT

## 2024-12-10 PROCEDURE — 80053 COMPREHEN METABOLIC PANEL: CPT

## 2024-12-10 PROCEDURE — 6370000000 HC RX 637 (ALT 250 FOR IP): Performed by: INTERNAL MEDICINE

## 2024-12-10 PROCEDURE — 85025 COMPLETE CBC W/AUTO DIFF WBC: CPT

## 2024-12-10 PROCEDURE — 2580000003 HC RX 258: Performed by: INTERNAL MEDICINE

## 2024-12-10 PROCEDURE — 6370000000 HC RX 637 (ALT 250 FOR IP): Performed by: NURSE PRACTITIONER

## 2024-12-10 PROCEDURE — 6360000002 HC RX W HCPCS: Performed by: INTERNAL MEDICINE

## 2024-12-10 PROCEDURE — 6370000000 HC RX 637 (ALT 250 FOR IP): Performed by: STUDENT IN AN ORGANIZED HEALTH CARE EDUCATION/TRAINING PROGRAM

## 2024-12-10 RX ORDER — INSULIN LISPRO 100 [IU]/ML
4 INJECTION, SOLUTION INTRAVENOUS; SUBCUTANEOUS ONCE
Status: COMPLETED | OUTPATIENT
Start: 2024-12-10 | End: 2024-12-10

## 2024-12-10 RX ORDER — PANTOPRAZOLE SODIUM 40 MG/1
40 TABLET, DELAYED RELEASE ORAL
Qty: 60 TABLET | Refills: 1 | Status: SHIPPED | OUTPATIENT
Start: 2024-12-10

## 2024-12-10 RX ADMIN — MORPHINE SULFATE 2 MG: 2 INJECTION, SOLUTION INTRAMUSCULAR; INTRAVENOUS at 03:33

## 2024-12-10 RX ADMIN — TIMOLOL MALEATE 1 DROP: 5 SOLUTION OPHTHALMIC at 08:33

## 2024-12-10 RX ADMIN — INSULIN LISPRO 16 UNITS: 100 INJECTION, SOLUTION INTRAVENOUS; SUBCUTANEOUS at 12:02

## 2024-12-10 RX ADMIN — INSULIN LISPRO 4 UNITS: 100 INJECTION, SOLUTION INTRAVENOUS; SUBCUTANEOUS at 12:02

## 2024-12-10 RX ADMIN — SODIUM CHLORIDE, PRESERVATIVE FREE 10 ML: 5 INJECTION INTRAVENOUS at 08:34

## 2024-12-10 RX ADMIN — INSULIN LISPRO 12 UNITS: 100 INJECTION, SOLUTION INTRAVENOUS; SUBCUTANEOUS at 08:34

## 2024-12-10 RX ADMIN — LISINOPRIL 20 MG: 20 TABLET ORAL at 08:32

## 2024-12-10 RX ADMIN — MORPHINE SULFATE 2 MG: 2 INJECTION, SOLUTION INTRAMUSCULAR; INTRAVENOUS at 08:33

## 2024-12-10 RX ADMIN — DORZOLAMIDE HYDROCHLORIDE 1 DROP: 20 SOLUTION OPHTHALMIC at 12:01

## 2024-12-10 RX ADMIN — GABAPENTIN 600 MG: 300 CAPSULE ORAL at 13:17

## 2024-12-10 RX ADMIN — ATORVASTATIN CALCIUM 20 MG: 20 TABLET, FILM COATED ORAL at 08:32

## 2024-12-10 RX ADMIN — INSULIN LISPRO 6 UNITS: 100 INJECTION, SOLUTION INTRAVENOUS; SUBCUTANEOUS at 12:02

## 2024-12-10 RX ADMIN — BRIMONIDINE TARTRATE 1 DROP: 2 SOLUTION OPHTHALMIC at 08:33

## 2024-12-10 RX ADMIN — GABAPENTIN 600 MG: 300 CAPSULE ORAL at 08:32

## 2024-12-10 RX ADMIN — INSULIN LISPRO 6 UNITS: 100 INJECTION, SOLUTION INTRAVENOUS; SUBCUTANEOUS at 08:34

## 2024-12-10 RX ADMIN — DORZOLAMIDE HYDROCHLORIDE 1 DROP: 20 SOLUTION OPHTHALMIC at 08:33

## 2024-12-10 RX ADMIN — PANTOPRAZOLE SODIUM 40 MG: 40 TABLET, DELAYED RELEASE ORAL at 05:24

## 2024-12-10 ASSESSMENT — PAIN SCALES - GENERAL
PAINLEVEL_OUTOF10: 9
PAINLEVEL_OUTOF10: 3
PAINLEVEL_OUTOF10: 4
PAINLEVEL_OUTOF10: 7

## 2024-12-10 ASSESSMENT — PAIN - FUNCTIONAL ASSESSMENT: PAIN_FUNCTIONAL_ASSESSMENT: ACTIVITIES ARE NOT PREVENTED

## 2024-12-10 ASSESSMENT — PAIN DESCRIPTION - LOCATION: LOCATION: BACK

## 2024-12-10 ASSESSMENT — PAIN DESCRIPTION - DESCRIPTORS: DESCRIPTORS: ACHING;DISCOMFORT;GNAWING

## 2024-12-10 NOTE — DISCHARGE INSTRUCTIONS
Follow up with primary care physician in the next 7 days or sooner if needed. If you do not have a Primary care physician, please schedule an appointment with one. Please ask prior to discharge about a list of local providers.     Please return to ER or call 911 if you develop any significant signs or symptoms.    I may not have addressed all of your medical illnesses or the abnormal blood work or imaging therefore please ask your PCP to obtain [facility] record to follow up on all of the abnormal labs, imaging and findings that I have and have not addressed during your hospitalization.     Discharging you from the hospital does not mean that your medical care ends here and now. You may still need additional work up, investigation, monitoring, and treatment to be handled from this point on by outside providers including your PCP, Specialists and other healthcare providers.     For medication questions, contact your retail pharmacy and your PCP.    Your medical team at [facility] appreciates the opportunity to work with you to get well!    Bhumika Calloway MD  1:37 PM

## 2024-12-10 NOTE — PROGRESS NOTES
PHARMACY NOTE:   Interdisciplinary Rounds Completed     ICU Day #1  Pt diagnosis: GI bleed    Follow up/Changes:       High SSI protocol ordered  Monitor protonix drip duration pending EGD and GI recommendations   Home meds in need of review/reorder as appropriate: lisinopril, simvastatin, gabapentin, zanaflex, tramadol    Renal:      Recent Labs     24  0132 24  0641   CREATININE 0.82 0.59*      Estimated Creatinine Clearance: 150 mL/min (A) (based on SCr of 0.59 mg/dL (L)).     Additional Information/Core Measures:      DVT Prophylaxis/Anticoagulant Therapy: --  Recent Labs     24  0133 24  0520 24  0641   HGB 13.2* 9.4* 10.3*    208 245     Recent Labs     24  0145   INR 3.3     Stress Ulcer Prophylaxis:   [x] Pantoprazole drip  [] Famotidine  Steroid:   [] Solu-medrol  [] Solu-cortef  [] Prednisone  [] Decadron  Insulin Coverage (goal: 140-180):   Recent Labs     24  0132 24  0151 24  0641   GLUCOSE 330* 295 264*     Lantus: 10 units BID  SSI: High  Humalo units required in the past 24 hours  Pressors:   [] Levophed  [] Epinephrine  [] Vasopressin  [] Dannie-Synephrine  Sedation:   [] Precedex  [] Fentanyl  [] Propofol  Fluids:  LR  Drips:  --  Antimicrobial Therapy:  Recent Labs     24  0133 24  0520 24  0641   WBC 11.6* 9.0 10.1     No results for input(s): \"PROCAL\" in the last 72 hours.  ID on consult: No  Antimicrobial agents:   --  Cultures:  --  Bowel Regimen:   [] Miralax  [] Colace  [] Lactulose  Core measures assessed/met    Felicia Cabezas PharmD, BCPS  2024 11:39 AM  
  Physician Progress Note      PATIENT:               DAAJ GUTIERREZ  CSN #:                  [id number]  :                       1975  ADMIT DATE:       2024 1:21 AM  DISCH DATE:  RESPONDING  PROVIDER #:        Bhumika Calloway MD          QUERY TEXT:    Patient admitted with upper GIB and is on chronic anticoagulation, Coumadin.     If possible, please document in the progress notes and discharge summary if   you are evaluating and/or treating any of the following:    The medical record reflects the following:  Risk Factors: 49 yom, Coumadin therapy hx of CVA DVT,  hemorrhagic shock,   upper GIB,  Clinical Indicators: presents to ED abdominal pain, hypotensive  95/61,   tachycardia,  hemoccult positive,  PT/INR  33.0/3.3,  26.7/2.5, 27.7/2.6,   H/P:4  hours apart in the ED, hemoglobin fell significantly from 13.2 => 9.4.  EGD   24  found to have duodenal ulcer  hemostatic clips placed  Treatment: Protonix, GI consult, EGD, FFP,  transfuse PRBC x 1 unit,  hold   home Warfarin, IV PPI bid, labs    Thank you  Sidney Al BSN RN [facility]   M-F 6am-2pm  Options provided:  -- GIB associated with Coumadin present on admission  -- Bleeding unrelated to anticoagulation  -- Other - I will add my own diagnosis  -- Disagree - Not applicable / Not valid  -- Disagree - Clinically unable to determine / Unknown  -- Refer to Clinical Documentation Reviewer    PROVIDER RESPONSE TEXT:    This patient has GIB associated with Coumadin present on admission.    Query created by: Sidney Al on 2024 9:08 AM      Electronically signed by:  Bhumika Calloway MD 12/10/2024 8:44 AM          
0615: Pt transferred to ICU Bed 8, ABCs intact, A&Ox4, Pale skin, Sinus Tachycardia on cardiac monitor, Skin assessed with Kacy RN, mepilex applied to heels/sacrum. Dr. Villafuerte at bedside. LR IV infusion started at 150ml/hr.   
0930: Assumed care of pt at this time. Alert and oriented x4, calm and cooperative. No complaints per pt at this time-- currently ordered pain medication adequately helping discomfort per pt. Safety maintained. Call light within reach.    1100: Echo at bedside.     1600: Pt taken to get IVC filter placed at this time.     Electronically signed by Ruba Christian RN on 12/9/2024 at 12:19 PM    
1805: monitor roomed called. Pt's . Dr. Maldonado made aware.   
Admission and VS completed. Agree with pervious RN assessment. Meds given per MAR. Pt tolerating clear liquid diet. Bed locked and in low position. Call light within reach.     Electronically signed by Estrella Santiago RN on 12/7/2024 at 3:21 PM   
Applied tele pack #60. Verified with monitor room. .  Electronically signed by Dagmar Joseph RN on 12/7/2024 at 12:15 PM    
Assessment completed this shift. Alert and oriented x 4. Up with standby assist. Morphine and Tylenol given as per MAR for chronic bilateral leg/back pain.Lungs clear and diminished.Clear liquid diet and advance as tolerated per Dr Correa. Ok to transfer to floor per Sujatha Correa and Lucila. Awaiting bed assignment.  Electronically signed by Dagmar Joseph RN on 12/7/2024 at 10:30 AM    
Discharge instructions provided to patient and spouse. Verbalized understanding of follow up appointments, activity, wound care & that he can change dressing on ivc filter insertion site tomorrow and cover daily until puncture site is gone, medications and reasons to return to ED/call physician. All questions answered. Copy of discharge instructions provided. Assisted into wheelchair and taken to personal car. Denies further needs.       IVs removed without complication. Pt tolerated well. Catheters appears  intact, dressing applied. No drainage noted.         Verbalized understanding of not taking warfarin until after follow up with Dr. Marie.  
Hospitalist Progress Note      PCP: Jimenez Porter MD    Date of Admission: 12/6/2024    Chief Complaint:    Chief Complaint   Patient presents with    Abdominal Pain     Patient states this morning he woke up with abd pain and tonight black stools.       Subjective:  Patient denies fevers, chills, sweats, CP, SOB, diarrhea or burning micturition.  Has abdominal and upper chest burning.  12 point ROS negative other than mentioned above     Medications:  Reviewed    Infusion Medications    sodium chloride      dextrose      lactated ringers 999 mL/hr at 12/06/24 1215    pantoprazole (PROTONIX) 80 mg in sodium chloride 0.9 % 100 mL infusion 8 mg/hr (12/06/24 1215)    sodium chloride      sodium chloride       Scheduled Medications    sodium chloride flush  5-40 mL IntraVENous 2 times per day    insulin glargine  10 Units SubCUTAneous BID    sodium chloride flush  5-40 mL IntraVENous 2 times per day    insulin lispro  0-16 Units SubCUTAneous 4x Daily AC & HS    lactated ringers  500 mL IntraVENous Once     PRN Meds: sodium chloride flush, sodium chloride, ondansetron **OR** ondansetron, acetaminophen **OR** acetaminophen, glucose, dextrose bolus **OR** dextrose bolus, glucagon (rDNA), dextrose, sodium chloride, sodium chloride flush, sodium chloride      Intake/Output Summary (Last 24 hours) at 12/6/2024 1224  Last data filed at 12/6/2024 1215  Gross per 24 hour   Intake 3601.43 ml   Output 600 ml   Net 3001.43 ml       Exam:    BP (!) 80/52   Pulse (!) 117   Temp 97.4 °F (36.3 °C) (Oral)   Resp 18   Ht 1.753 m (5' 9\")   Wt 81.6 kg (180 lb)   SpO2 97%   BMI 26.58 kg/m²     General appearance: Mild distress.  HEENT:  Conjunctivae/corneas clear.  Neck: Trachea midline.  Respiratory:  Normal respiratory effort. Clear to auscultation  Cardiovascular: tachycardic  Abdomen: Soft, non-tender, non-distended with normal bowel sounds.  Musculoskeletal: RUE edema  Neuro: Non Focal.     Labs:   Recent Labs     
Hospitalist Progress Note      PCP: Jimenez Porter MD    Date of Admission: 12/6/2024    Chief Complaint:    Chief Complaint   Patient presents with    Abdominal Pain     Patient states this morning he woke up with abd pain and tonight black stools.       Subjective:  Patient denies fevers, chills, sweats, CP, SOB, diarrhea or burning micturition.  Has burning pain in his legs from his diabetes.  12 point ROS negative other than mentioned above     Medications:  Reviewed    Infusion Medications    sodium chloride      dextrose      lactated ringers 150 mL/hr at 12/07/24 0900    pantoprazole (PROTONIX) 80 mg in sodium chloride 0.9 % 100 mL infusion 8 mg/hr (12/07/24 0655)    sodium chloride      sodium chloride       Scheduled Medications    lisinopril  20 mg Oral Daily    insulin lispro  3 Units SubCUTAneous TID WC    sodium chloride flush  5-40 mL IntraVENous 2 times per day    insulin glargine  10 Units SubCUTAneous BID    insulin lispro  0-16 Units SubCUTAneous 4x Daily AC & HS    lactated ringers  500 mL IntraVENous Once     PRN Meds: sodium chloride flush, sodium chloride, ondansetron **OR** ondansetron, acetaminophen **OR** acetaminophen, glucose, dextrose bolus **OR** dextrose bolus, glucagon (rDNA), dextrose, sodium chloride, sodium chloride, morphine      Intake/Output Summary (Last 24 hours) at 12/7/2024 1118  Last data filed at 12/7/2024 0706  Gross per 24 hour   Intake 4168.08 ml   Output 1051 ml   Net 3117.08 ml       Exam:    BP (!) 186/154   Pulse (!) 125   Temp 98.3 °F (36.8 °C) (Oral)   Resp 14   Ht 1.753 m (5' 9\")   Wt 81.6 kg (180 lb)   SpO2 99%   BMI 26.58 kg/m²     General appearance: Mild distress.  HEENT:  Conjunctivae/corneas clear.  Neck: Trachea midline.  Respiratory:  Normal respiratory effort. Clear to auscultation  Cardiovascular: tachycardic  Abdomen: Soft, non-tender, non-distended with normal bowel sounds.  Musculoskeletal: RUE edema  Neuro: Non Focal.     Labs:   Recent Labs 
Patient alert and oriented x4 c/o abd pain. GI in to see patient ordered 2 units of plasma and patient to get EGD at 1400. Morphine given for pain. Patients wife states his hand looks pale- US of RUE ordered stat. NG tube inserted due to abd pain with giving morphine-dark brown came out of NG.    1530- plasma and PRBC given per GI  1600- patient getting EGD-NG removed by Dr Martines 3 clips have been placed VSS paitent had no complications     1800- patient c/o back pain- perfect served dr yadav for pain medication  
Patient assessment and vitals complete and per flowsheets. Patient up independently in room, sitting in chair. No signs of bleeding, hopeful for discharge today and tolerating diet. Normal Sinus on monitor at this time.  
Portable tele pack placed on patient.  Rt groin dressing remains clean, dry and intact.  No bleeding, swelling or hematoma present.  Report called to Ruba BRANDON on [address].   Transport requested.  Pt remains on bedrest.  Pt denies pain.  Positive pedal pulses present.    
Progress Note  Date:2024       Room:[place] [address]  Patient Name:Juan Allen     YOB: 1975     Age:49 y.o.        Subjective    Subjective feels okay no emesis or melena, hemoglobin is 8.7  Review of Systems  Objective         Vitals Last 24 Hours:  TEMPERATURE:  Temp  Av.8 °F (37.1 °C)  Min: 98.2 °F (36.8 °C)  Max: 99.8 °F (37.7 °C)  RESPIRATIONS RANGE: Resp  Av.2  Min: 8  Max: 25  PULSE OXIMETRY RANGE: SpO2  Av.6 %  Min: 93 %  Max: 100 %  PULSE RANGE: Pulse  Av.2  Min: 105  Max: 131  BLOOD PRESSURE RANGE: Systolic (24hrs), Av , Min:125 , Max:186   ; Diastolic (24hrs), Av, Min:51, Max:154    I/O (24Hr):    Intake/Output Summary (Last 24 hours) at 2024 1709  Last data filed at 2024 1436  Gross per 24 hour   Intake 2501.79 ml   Output 800 ml   Net 1701.79 ml     Objective  Labs/Imaging/Diagnostics    Labs:  CBC:  Recent Labs     24  0520 24  0641 24  1215 24  2255 24  0512 24  1214   WBC 9.0 10.1  --   --  10.5  --    RBC 2.93* 3.14*  --   --  2.78*  --    HGB 9.4* 10.3*   < > 9.0* 9.1* 8.7*   HCT 27.8* 29.4*   < > 24.8* 24.9* 24.1*   MCV 94.9* 93.6*  --   --  89.6  --    RDW 13.1 13.0  --   --  14.1  --     245  --   --  202  --     < > = values in this interval not displayed.     CHEMISTRIES:  Recent Labs     24  0132 24  0151 24  0641 24  1233 24  0512     --  137  --  139   K 4.5  --  4.8  --  4.1   CL 99  --  107  --  105   CO2 23  --  21  --  22   BUN 38*  --  38*  --  15   CREATININE 0.82  --  0.59* 0.7* 0.55*   GLUCOSE 330* 295 264*  --  245*   MG 2.5*  --   --   --   --      PT/INR:  Recent Labs     24  1215 24  2255 24  0512   PROTIME 26.7* 27.7* 24.1*   INR 2.5 2.6 2.2     APTT:  Recent Labs     24  0145 24  1215 24  0512   APTT 30.7 28.8 31.0     LIVER PROFILE:  Recent Labs     24  0132 24  0641 24  0512   AST 18 
Pt had two stool occurrences last night. Both were dark brown/black liquidly.   
Pt returned to pre/post after IVC filter insertion.  Rt groin dressed with a quick clot and tegaderm.  No bleeding swelling or hematoma present.  Bedside report received from Miguel BRANDON.     
Pt to [address] [facility] with transport on bedrest.  
Pt's right groin shaved.    
Pulmonary & Critical Care Medicine ICU Progress Note  Chief complaint : GI bleed    Subjunctive/24 hour events :   Patient seen and examined during multidisciplinary rounds with RN, charge nurse, RT, pharmacy, dietitian, and social service.   No issues overnight, no active bleeding, EGD done yesterday found to have superficial duodenal ulcer hemostatic clips placed.  No shortness of breath, no chest pain, did not require pressors, no fever, he is on room air saturation 100%        New information updated in the note today, rest of the examination did not change compared to yesterday.  Social History     Tobacco Use    Smoking status: Former     Current packs/day: 0.00     Types: Cigarettes     Quit date:      Years since quittin.9     Passive exposure: Past    Smokeless tobacco: Never   Substance Use Topics    Alcohol use: No         Problem Relation Age of Onset    Clotting Disorder Mother         blood clots       Recent Labs     24  1233   PHART 7.375   WWF1NEB 33*   PO2ART 74*       MV Settings:     / / /            IV:   sodium chloride      dextrose      lactated ringers 150 mL/hr at 24 0900    pantoprazole (PROTONIX) 80 mg in sodium chloride 0.9 % 100 mL infusion 8 mg/hr (24 0655)    sodium chloride      sodium chloride         Vitals:  BP (!) 157/72   Pulse (!) 111   Temp 99.8 °F (37.7 °C) (Axillary)   Resp 16   Ht 1.753 m (5' 9\")   Wt 81.6 kg (180 lb)   SpO2 100%   BMI 26.58 kg/m²    Tmax:        Intake/Output Summary (Last 24 hours) at 2024 0915  Last data filed at 2024 0706  Gross per 24 hour   Intake 4657.36 ml   Output 1351 ml   Net 3306.36 ml       EXAM:  General: alert, cooperative, no distress  Head: normocephalic, atraumatic  Eyes:No gross abnormalities.  ENT:  MMM no lesions  Neck:  supple and no masses  Chest : clear to auscultation bilaterally- no wheezes, rales or rhonchi, normal air movement, no respiratory distress  Heart:: Heart sounds are normal.  
Report called to Sanam Paul RN. Patient to transfer to  room 463.  Electronically signed by Dagmar Joseph RN on 12/7/2024 at 11:06 AM    
Spiritual Health History and Assessment/Progress Note  [facility]    Initial Encounter,  ,  ,      Name: Juan Allen MRN: [id number]    Age: 49 y.o.     Sex: male   Language: English   Restorationism: Other   Upper GI bleed     Date: 12/6/2024            Total Time Calculated: 15 min              Spiritual Assessment began in [facility] ICU        Referral/Consult From: Rounding   Encounter Overview/Reason: Initial Encounter  Service Provided For: Patient    Radha, Belief, Meaning:   Patient has beliefs or practices that help with coping during difficult times  Family/Friends No family/friends present      Importance and Influence:  Patient has spiritual/personal beliefs that influence decisions regarding their health  Family/Friends No family/friends present    Community:  Patient feels well-supported. Support system includes: Spouse/Partner, Children, and Extended family  Family/Friends No family/friends present    Assessment and Plan of Care:     Patient Interventions include: Affirmed coping skills/support systems  Family/Friends Interventions include: No family/friends present    Patient Plan of Care: Spiritual Care available upon further referral  Family/Friends Plan of Care: No family/friends present    Electronically signed by Chaplain Doe on 12/6/2024 at 10:52 AM   
respiratory effort. Clear to auscultation  Cardiovascular: tachycardic  Abdomen: Soft, non-tender, non-distended with normal bowel sounds.  Musculoskeletal: bilateral +1 edema  Neuro: Non Focal.     Labs:   Recent Labs     12/06/24  0641 12/06/24  1215 12/07/24  0512 12/07/24  1214 12/07/24  1743 12/07/24  2329 12/08/24  0539   WBC 10.1  --  10.5  --   --   --  5.7   HGB 10.3*   < > 9.1*   < > 8.3* 8.0* 7.9*   HCT 29.4*   < > 24.9*   < > 23.5* 22.6* 22.1*     --  202  --   --   --  157    < > = values in this interval not displayed.     Recent Labs     12/06/24  0641 12/06/24  1233 12/07/24  0512 12/08/24  0539     --  139 140   K 4.8  --  4.1 3.6     --  105 106   CO2 21  --  22 27   BUN 38*  --  15 7   CREATININE 0.59* 0.7* 0.55* 0.50*   CALCIUM 7.8*  --  8.2* 8.3*     Recent Labs     12/06/24  0641 12/07/24  0512 12/08/24  0539   AST 12 16 24   ALT 14 13 15   BILITOT <0.2 0.4 0.3   ALKPHOS 34* 44 42     Recent Labs     12/06/24  2255 12/07/24  0512 12/08/24  0539   INR 2.6 2.2 1.9     No results for input(s): \"CKTOTAL\", \"TROPONINI\" in the last 72 hours.    Urinalysis:      Lab Results   Component Value Date/Time    NITRU Negative 12/06/2024 04:44 AM    BLOODU Negative 12/06/2024 04:44 AM    GLUCOSEU >=1000 12/06/2024 04:44 AM       Radiology:  Vascular duplex upper extremity venous right   Final Result   No evidence of DVT.         XR CHEST ABDOMEN NG PLACEMENT   Final Result   Nasogastric tube tip overlies the region the stomach.         CTA CHEST ABDOMEN PELVIS W WO CONTRAST   Final Result   1. No evidence of thoracic or abdominal aortic aneurysm or dissection.   2. No source of active upper or lower GI bleeding is identified.   3. Minimal sigmoid diverticulosis.         XR CHEST PORTABLE   Final Result   No acute cardiopulmonary process is identified by portable chest x-ray.         Vascular duplex lower extremity venous bilateral    (Results Pending)     Assessment/Plan:    #Acute GIB in 
12/06/24  0444   COLORU Yellow   PHUR 6.0   CLARITYU CLEAR   LEUKOCYTESUR Negative   UROBILINOGEN 0.2   BILIRUBINUR Negative   BLOODU Negative   GLUCOSEU >=1000*      and [facility] records reviewed  Cultures:    Films:  CXR films reviewed by me and it showed no infiltrate      Assessment:  This is a critically ill patient      Acute blood loss anemia, secondary to upper GI bleed  Peptic ulcer disease, with bleeding ulcers, status post clipping  Coagulopathy secondary to Coumadin, currently on hold  History of DVT  Hyperglycemia  Hypotension, resolved     Recommendation  Coumadin on hold, he is on anticoagulation for history of DVT and stroke, no recorded history of hypercoagulable state, however per f patient he has strong family history at this time not sure if IVC filter is indicated  Follow-up bilateral lower extremity ultrasound if negative for DVT consider hematology consult   Resume anticoagulation when okay with GI  Continue PPI      DW Dr Maldonado   I spent 50 min with this patient, greater the 80% of this time was spent in counseling and/or coordinating of care.    I spent 50 min with this patient, greater the 80% of this time was spent in counseling and/or coordinating of care.        Electronically signed by Anne-Marie Gaytan MD,  [facility] ,on 12/8/2024 at 11:34 AM    
AMS

## 2024-12-10 NOTE — PLAN OF CARE
Problem: Chronic Conditions and Co-morbidities  Goal: Patient's chronic conditions and co-morbidity symptoms are monitored and maintained or improved  12/9/2024 2131 by Salvador Castorena RN  Outcome: Progressing  12/9/2024 1218 by Ruba Christian RN  Outcome: Progressing  12/9/2024 1217 by Ruba Christian RN  Outcome: Progressing     Problem: Discharge Planning  Goal: Discharge to home or other facility with appropriate resources  12/9/2024 2131 by Salvador Castorena RN  Outcome: Progressing  12/9/2024 1218 by Ruba Christian RN  Outcome: Progressing  12/9/2024 1217 by Ruba Christian RN  Outcome: Progressing     Problem: ABCDS Injury Assessment  Goal: Absence of physical injury  12/9/2024 2131 by Salvador Castorena RN  Outcome: Progressing  12/9/2024 1218 by Ruba Christian RN  Outcome: Progressing  12/9/2024 1217 by Ruba Christian RN  Outcome: Progressing     Problem: Safety - Adult  Goal: Free from fall injury  12/9/2024 2131 by Salvador Castorena RN  Outcome: Progressing  12/9/2024 1218 by Ruba Christian RN  Outcome: Progressing     Problem: Pain  Goal: Verbalizes/displays adequate comfort level or baseline comfort level  Outcome: Progressing

## 2024-12-10 NOTE — CARE COORDINATION
MET W/PT TO ASSESS NEEDS AND DISCUSS DISCHARGE PLAN WHICH IS HOME. PT IS AMBULATING IN ROOM INDEPENDENTLY. ON RA, DENIES HOME GOING NEEDS. PG 2. IMM REVIEWED, AND SIGNED.  COPY PROVIDED. ANTICOAGULATION PLAN WITH HEMOC AT FOLLOW UP VISIT.

## 2024-12-11 LAB
EKG ATRIAL RATE: 115 BPM
EKG P AXIS: 41 DEGREES
EKG P-R INTERVAL: 154 MS
EKG Q-T INTERVAL: 348 MS
EKG QRS DURATION: 90 MS
EKG QTC CALCULATION (BAZETT): 481 MS
EKG R AXIS: 65 DEGREES
EKG T AXIS: 14 DEGREES
EKG VENTRICULAR RATE: 115 BPM

## 2024-12-11 PROCEDURE — 93010 ELECTROCARDIOGRAM REPORT: CPT | Performed by: INTERNAL MEDICINE

## 2024-12-12 LAB — ECHO BSA: 1.99 M2

## 2024-12-13 SDOH — ECONOMIC STABILITY: INCOME INSECURITY: HOW HARD IS IT FOR YOU TO PAY FOR THE VERY BASICS LIKE FOOD, HOUSING, MEDICAL CARE, AND HEATING?: PATIENT DECLINED

## 2024-12-13 SDOH — ECONOMIC STABILITY: FOOD INSECURITY: WITHIN THE PAST 12 MONTHS, YOU WORRIED THAT YOUR FOOD WOULD RUN OUT BEFORE YOU GOT MONEY TO BUY MORE.: PATIENT DECLINED

## 2024-12-13 SDOH — ECONOMIC STABILITY: TRANSPORTATION INSECURITY
IN THE PAST 12 MONTHS, HAS LACK OF TRANSPORTATION KEPT YOU FROM MEETINGS, WORK, OR FROM GETTING THINGS NEEDED FOR DAILY LIVING?: PATIENT DECLINED

## 2024-12-13 SDOH — ECONOMIC STABILITY: FOOD INSECURITY: WITHIN THE PAST 12 MONTHS, THE FOOD YOU BOUGHT JUST DIDN'T LAST AND YOU DIDN'T HAVE MONEY TO GET MORE.: PATIENT DECLINED

## 2024-12-16 ENCOUNTER — OFFICE VISIT (OUTPATIENT)
Dept: PRIMARY CARE CLINIC | Age: 49
End: 2024-12-16
Payer: MEDICARE

## 2024-12-16 VITALS
OXYGEN SATURATION: 97 % | HEART RATE: 94 BPM | SYSTOLIC BLOOD PRESSURE: 134 MMHG | WEIGHT: 181 LBS | HEIGHT: 69 IN | BODY MASS INDEX: 26.81 KG/M2 | DIASTOLIC BLOOD PRESSURE: 76 MMHG

## 2024-12-16 DIAGNOSIS — L30.8 OTHER ECZEMA: ICD-10-CM

## 2024-12-16 DIAGNOSIS — K26.0 ACUTE DUODENAL ULCER WITH BLEEDING: ICD-10-CM

## 2024-12-16 DIAGNOSIS — L73.9 FOLLICULITIS: ICD-10-CM

## 2024-12-16 DIAGNOSIS — E10.3293 TYPE 1 DIABETES MELLITUS WITH MILD NONPROLIFERATIVE RETINOPATHY OF BOTH EYES WITHOUT MACULAR EDEMA (HCC): ICD-10-CM

## 2024-12-16 DIAGNOSIS — Z00.00 MEDICARE ANNUAL WELLNESS VISIT, SUBSEQUENT: Primary | ICD-10-CM

## 2024-12-16 PROCEDURE — 3051F HG A1C>EQUAL 7.0%<8.0%: CPT | Performed by: INTERNAL MEDICINE

## 2024-12-16 PROCEDURE — G0439 PPPS, SUBSEQ VISIT: HCPCS | Performed by: INTERNAL MEDICINE

## 2024-12-16 RX ORDER — MUPIROCIN 20 MG/G
OINTMENT TOPICAL
Qty: 22 G | Refills: 2 | Status: SHIPPED | OUTPATIENT
Start: 2024-12-16 | End: 2024-12-23

## 2024-12-16 RX ORDER — TRIAMCINOLONE ACETONIDE 0.25 MG/G
CREAM TOPICAL
Qty: 454 G | Refills: 5 | Status: SHIPPED | OUTPATIENT
Start: 2024-12-16

## 2024-12-16 ASSESSMENT — PATIENT HEALTH QUESTIONNAIRE - PHQ9
SUM OF ALL RESPONSES TO PHQ9 QUESTIONS 1 & 2: 0
SUM OF ALL RESPONSES TO PHQ QUESTIONS 1-9: 0
1. LITTLE INTEREST OR PLEASURE IN DOING THINGS: NOT AT ALL
2. FEELING DOWN, DEPRESSED OR HOPELESS: NOT AT ALL
SUM OF ALL RESPONSES TO PHQ QUESTIONS 1-9: 0

## 2024-12-16 ASSESSMENT — ENCOUNTER SYMPTOMS
CHOKING: 0
TROUBLE SWALLOWING: 0
VOICE CHANGE: 0
VOMITING: 0
SHORTNESS OF BREATH: 0
PHOTOPHOBIA: 0
NAUSEA: 0

## 2024-12-16 NOTE — PROGRESS NOTES
Juan Allen 49 y.o. male presents today with   Chief Complaint   Patient presents with    Follow-Up from Hospital     Admitted 12/6 GIB  D/C 12/10       Rash  This is a recurrent problem. The current episode started more than 1 year ago. The problem has been waxing and waning since onset. The affected locations include the left lower leg and right lower leg. The rash is characterized by dryness and itchiness. Pertinent negatives include no fatigue, fever, shortness of breath or vomiting.   Diabetes  He presents for his follow-up diabetic visit. He has type 2 diabetes mellitus. Pertinent negatives for hypoglycemia include no tremors. Pertinent negatives for diabetes include no chest pain and no fatigue.   GI bleed.      Past Medical History:   Diagnosis Date    Acute duodenal ulcer with bleeding 12/03/2024    3 ulcers    Chest pain of uncertain etiology 09/27/2017    Dizziness 09/28/2017    DVT of leg (deep venous thrombosis) (HCC)     x2    Hyperlipidemia     Hypertension     Retinopathy due to secondary diabetes mellitus (HCC)     Rheumatoid arthritis (HCC)     dr [facility].    Smoker     quit 2008    SOB (shortness of breath) 09/28/2017    Stroke (HCC)     bleed    Type 1 diabetes mellitus, uncontrolled      Patient Active Problem List    Diagnosis Date Noted    Melena 12/06/2024    Duodenal ulcer 12/06/2024    Hypercoagulable state (HCC) 12/06/2024    Dizziness 09/28/2017    SOB (shortness of breath) 09/28/2017    Chest pain of uncertain etiology 09/27/2017    Hypercholesteremia 10/10/2012    Diabetic retinopathy (HCC) 10/10/2012    Type 1 diabetes mellitus without complication (HCC) 11/01/2011     Past Surgical History:   Procedure Laterality Date    INVASIVE VASCULAR N/A 12/9/2024    Vena cava filter insertion - cath lab performed by Matthew Mary DO at [facility]    UPPER GASTROINTESTINAL ENDOSCOPY N/A 12/6/2024    ESOPHAGOGASTRODUODENOSCOPY performed by Natanael Martines MD at

## 2024-12-17 DIAGNOSIS — I87.1 IVC (INFERIOR VENA CAVA OBSTRUCTION): Primary | ICD-10-CM

## 2025-01-02 ENCOUNTER — OFFICE VISIT (OUTPATIENT)
Dept: GASTROENTEROLOGY | Age: 50
End: 2025-01-02
Payer: MEDICARE

## 2025-01-02 ENCOUNTER — OFFICE VISIT (OUTPATIENT)
Dept: FAMILY MEDICINE CLINIC | Age: 50
End: 2025-01-02
Payer: MEDICARE

## 2025-01-02 VITALS — HEIGHT: 69 IN | HEART RATE: 105 BPM | BODY MASS INDEX: 27.55 KG/M2 | OXYGEN SATURATION: 99 % | WEIGHT: 186 LBS

## 2025-01-02 VITALS
SYSTOLIC BLOOD PRESSURE: 128 MMHG | OXYGEN SATURATION: 97 % | BODY MASS INDEX: 27.55 KG/M2 | TEMPERATURE: 98.1 F | HEIGHT: 69 IN | HEART RATE: 97 BPM | DIASTOLIC BLOOD PRESSURE: 82 MMHG | WEIGHT: 186 LBS

## 2025-01-02 DIAGNOSIS — R05.1 ACUTE COUGH: Primary | ICD-10-CM

## 2025-01-02 DIAGNOSIS — K26.9 DUODENAL ULCER: ICD-10-CM

## 2025-01-02 DIAGNOSIS — R50.9 SUBJECTIVE FEVER: ICD-10-CM

## 2025-01-02 DIAGNOSIS — K26.9 DUODENAL ULCER: Primary | ICD-10-CM

## 2025-01-02 LAB
BASOPHILS # BLD: 0 K/UL (ref 0–0.2)
BASOPHILS NFR BLD: 0.2 %
EOSINOPHIL # BLD: 0.2 K/UL (ref 0–0.7)
EOSINOPHIL NFR BLD: 3.8 %
ERYTHROCYTE [DISTWIDTH] IN BLOOD BY AUTOMATED COUNT: 12.9 % (ref 11.5–14.5)
HCT VFR BLD AUTO: 38 % (ref 42–52)
HGB BLD-MCNC: 12.6 G/DL (ref 14–18)
INFLUENZA A ANTIBODY: NORMAL
INFLUENZA B ANTIBODY: NORMAL
LYMPHOCYTES # BLD: 0.5 K/UL (ref 1–4.8)
LYMPHOCYTES NFR BLD: 9.7 %
Lab: NORMAL
MCH RBC QN AUTO: 30.9 PG (ref 27–31.3)
MCHC RBC AUTO-ENTMCNC: 33.2 % (ref 33–37)
MCV RBC AUTO: 93.1 FL (ref 79–92.2)
MONOCYTES # BLD: 0.6 K/UL (ref 0.2–0.8)
MONOCYTES NFR BLD: 11.5 %
NEUTROPHILS # BLD: 3.7 K/UL (ref 1.4–6.5)
NEUTS SEG NFR BLD: 74.4 %
PERFORMING INSTRUMENT: NORMAL
PLATELET # BLD AUTO: 271 K/UL (ref 130–400)
QC PASS/FAIL: NORMAL
RBC # BLD AUTO: 4.08 M/UL (ref 4.7–6.1)
SARS-COV-2, POC: NORMAL
WBC # BLD AUTO: 5 K/UL (ref 4.8–10.8)

## 2025-01-02 PROCEDURE — 87426 SARSCOV CORONAVIRUS AG IA: CPT | Performed by: NURSE PRACTITIONER

## 2025-01-02 PROCEDURE — 99213 OFFICE O/P EST LOW 20 MIN: CPT | Performed by: NURSE PRACTITIONER

## 2025-01-02 PROCEDURE — 87804 INFLUENZA ASSAY W/OPTIC: CPT | Performed by: NURSE PRACTITIONER

## 2025-01-02 PROCEDURE — 99213 OFFICE O/P EST LOW 20 MIN: CPT | Performed by: SPECIALIST

## 2025-01-02 ASSESSMENT — ENCOUNTER SYMPTOMS
DIARRHEA: 0
NAUSEA: 0
SHORTNESS OF BREATH: 0
ABDOMINAL DISTENTION: 0
RESPIRATORY NEGATIVE: 1
ABDOMINAL PAIN: 0
CONSTIPATION: 0
ANAL BLEEDING: 0
WHEEZING: 0
GASTROINTESTINAL NEGATIVE: 1
DIARRHEA: 0
BLOOD IN STOOL: 0
VOMITING: 0
EYES NEGATIVE: 1
RHINORRHEA: 0
RECTAL PAIN: 0
COUGH: 1
TROUBLE SWALLOWING: 0
VOMITING: 0
NAUSEA: 0
SORE THROAT: 1

## 2025-01-02 NOTE — PROGRESS NOTES
Subjective:      Patient ID: Juan Allen is a 49 y.o. male who presents today for:  Chief Complaint   Patient presents with    Cough     States cough and congest and chest feels sore and hurts since yesterday and he's been taking tylenol        Cough  Associated symptoms include a sore throat. Pertinent negatives include no chills, ear pain, fever, headaches, myalgias, rash, rhinorrhea, shortness of breath or wheezing.   A lot of medical things latlely  Most of them related to his blood thinners   He felt a itch in his chest yesterday   Today got worse     Felt like a slight fever   He took Tylenol   Feels dry   Coughing a little bit   He trys not to cough but he's really holding back         Past Medical History:   Diagnosis Date    Acute duodenal ulcer with bleeding 2024    3 ulcers    Chest pain of uncertain etiology 2017    Dizziness 2017    DVT of leg (deep venous thrombosis) (HCC)     x2    Hyperlipidemia     Hypertension     Retinopathy due to secondary diabetes mellitus (HCC)     Rheumatoid arthritis (HCC)     Terell Marie ht.    Smoker     quit     SOB (shortness of breath) 2017    Stroke (HCC)     bleed    Type 1 diabetes mellitus, uncontrolled      Past Surgical History:   Procedure Laterality Date    INVASIVE VASCULAR N/A 2024    Vena cava filter insertion - cath lab performed by Matthew Mary DO at [facility] [place] CARDIAC CATH LAB    UPPER GASTROINTESTINAL ENDOSCOPY N/A 2024    ESOPHAGOGASTRODUODENOSCOPY performed by Natanael Martines MD at [facility] [place] [facility]STRO [facility]     Social History     Socioeconomic History    Marital status:      Spouse name: Not on file    Number of children: Not on file    Years of education: Not on file    Highest education level: Not on file   Occupational History    Not on file   Tobacco Use    Smoking status: Former     Current packs/day: 0.00     Types: Cigarettes     Quit date:      Years since quittin.0     Passive

## 2025-01-02 NOTE — PROGRESS NOTES
weeks.  Will check stool for H. pylori antigen when patient is done with PPI, patient to avoid NSAIDs.  Will check CBC, return after 6 months      No follow-ups on file.    Shreya Correa MD   StaffGastroenterologist  [facility]    Please note this report has been partially produced using speech recognitionsoftware  and may cause contain errors related to that system including grammar, punctuation and spelling as well as words andphrases that may seem inappropriate. If there are questions or concerns please feel free to contact me to clarify.

## 2025-01-04 DIAGNOSIS — E10.9 TYPE 1 DIABETES MELLITUS WITHOUT COMPLICATION (HCC): ICD-10-CM

## 2025-01-06 RX ORDER — SIMVASTATIN 20 MG
20 TABLET ORAL NIGHTLY
Qty: 90 TABLET | Refills: 1 | Status: SHIPPED | OUTPATIENT
Start: 2025-01-06

## 2025-01-27 ENCOUNTER — TELEPHONE (OUTPATIENT)
Dept: GASTROENTEROLOGY | Age: 50
End: 2025-01-27

## 2025-01-27 DIAGNOSIS — K26.9 DUODENAL ULCER: Primary | ICD-10-CM

## 2025-01-28 ENCOUNTER — PREP FOR PROCEDURE (OUTPATIENT)
Dept: GASTROENTEROLOGY | Age: 50
End: 2025-01-28

## 2025-01-28 RX ORDER — SODIUM CHLORIDE 9 MG/ML
INJECTION, SOLUTION INTRAVENOUS PRN
Status: CANCELLED | OUTPATIENT
Start: 2025-01-28

## 2025-01-28 RX ORDER — SODIUM CHLORIDE 9 MG/ML
INJECTION, SOLUTION INTRAVENOUS CONTINUOUS
Status: CANCELLED | OUTPATIENT
Start: 2025-01-28

## 2025-01-28 RX ORDER — PANTOPRAZOLE SODIUM 40 MG/1
40 TABLET, DELAYED RELEASE ORAL
Qty: 60 TABLET | Refills: 1 | Status: SHIPPED | OUTPATIENT
Start: 2025-01-28

## 2025-01-28 RX ORDER — SODIUM CHLORIDE 0.9 % (FLUSH) 0.9 %
5-40 SYRINGE (ML) INJECTION EVERY 12 HOURS SCHEDULED
Status: CANCELLED | OUTPATIENT
Start: 2025-01-28

## 2025-01-28 RX ORDER — SODIUM CHLORIDE 0.9 % (FLUSH) 0.9 %
5-40 SYRINGE (ML) INJECTION PRN
Status: CANCELLED | OUTPATIENT
Start: 2025-01-28

## 2025-01-28 NOTE — TELEPHONE ENCOUNTER
The patient is requesting a refill on the Protonix 40 mg, he said he was told to cut down to 1 pill daily for 3 mo.

## 2025-01-31 ENCOUNTER — HOSPITAL ENCOUNTER (OUTPATIENT)
Age: 50
Setting detail: OUTPATIENT SURGERY
Discharge: HOME | End: 2025-01-31
Attending: SPECIALIST | Admitting: SPECIALIST
Payer: MEDICARE

## 2025-01-31 ENCOUNTER — ANESTHESIA (OUTPATIENT)
Dept: ENDOSCOPY | Age: 50
End: 2025-01-31
Payer: MEDICARE

## 2025-01-31 ENCOUNTER — ANESTHESIA EVENT (OUTPATIENT)
Dept: ENDOSCOPY | Age: 50
End: 2025-01-31
Payer: MEDICARE

## 2025-01-31 VITALS
OXYGEN SATURATION: 92 % | SYSTOLIC BLOOD PRESSURE: 138 MMHG | TEMPERATURE: 97.2 F | WEIGHT: 181 LBS | HEART RATE: 100 BPM | BODY MASS INDEX: 26.81 KG/M2 | DIASTOLIC BLOOD PRESSURE: 76 MMHG | RESPIRATION RATE: 18 BRPM | HEIGHT: 69 IN

## 2025-01-31 LAB
GLUCOSE BLD-MCNC: 189 MG/DL (ref 70–99)
PERFORMED ON: ABNORMAL

## 2025-01-31 PROCEDURE — 3609017100 HC EGD: Performed by: SPECIALIST

## 2025-01-31 PROCEDURE — 3700000000 HC ANESTHESIA ATTENDED CARE: Performed by: SPECIALIST

## 2025-01-31 PROCEDURE — 6360000002 HC RX W HCPCS

## 2025-01-31 PROCEDURE — 2709999900 HC NON-CHARGEABLE SUPPLY: Performed by: SPECIALIST

## 2025-01-31 PROCEDURE — 2500000003 HC RX 250 WO HCPCS: Performed by: SPECIALIST

## 2025-01-31 PROCEDURE — 3700000001 HC ADD 15 MINUTES (ANESTHESIA): Performed by: SPECIALIST

## 2025-01-31 PROCEDURE — 6360000002 HC RX W HCPCS: Performed by: NURSE ANESTHETIST, CERTIFIED REGISTERED

## 2025-01-31 PROCEDURE — 7100000010 HC PHASE II RECOVERY - FIRST 15 MIN: Performed by: SPECIALIST

## 2025-01-31 PROCEDURE — 7100000011 HC PHASE II RECOVERY - ADDTL 15 MIN: Performed by: SPECIALIST

## 2025-01-31 RX ORDER — PROPOFOL 10 MG/ML
INJECTION, EMULSION INTRAVENOUS
Status: DISCONTINUED | OUTPATIENT
Start: 2025-01-31 | End: 2025-01-31 | Stop reason: SDUPTHER

## 2025-01-31 RX ORDER — SODIUM CHLORIDE 9 MG/ML
INJECTION, SOLUTION INTRAVENOUS PRN
Status: DISCONTINUED | OUTPATIENT
Start: 2025-01-31 | End: 2025-01-31 | Stop reason: HOSPADM

## 2025-01-31 RX ORDER — SODIUM CHLORIDE 0.9 % (FLUSH) 0.9 %
5-40 SYRINGE (ML) INJECTION PRN
Status: DISCONTINUED | OUTPATIENT
Start: 2025-01-31 | End: 2025-01-31 | Stop reason: HOSPADM

## 2025-01-31 RX ORDER — SODIUM CHLORIDE 9 MG/ML
INJECTION, SOLUTION INTRAVENOUS CONTINUOUS
Status: DISCONTINUED | OUTPATIENT
Start: 2025-01-31 | End: 2025-01-31 | Stop reason: HOSPADM

## 2025-01-31 RX ORDER — SODIUM CHLORIDE 0.9 % (FLUSH) 0.9 %
5-40 SYRINGE (ML) INJECTION EVERY 12 HOURS SCHEDULED
Status: DISCONTINUED | OUTPATIENT
Start: 2025-01-31 | End: 2025-01-31 | Stop reason: HOSPADM

## 2025-01-31 RX ORDER — LIDOCAINE HYDROCHLORIDE 20 MG/ML
INJECTION, SOLUTION INFILTRATION; PERINEURAL
Status: DISCONTINUED | OUTPATIENT
Start: 2025-01-31 | End: 2025-01-31 | Stop reason: SDUPTHER

## 2025-01-31 RX ADMIN — PROPOFOL 350 MG: 10 INJECTION, EMULSION INTRAVENOUS at 12:55

## 2025-01-31 RX ADMIN — LIDOCAINE HYDROCHLORIDE 60 MG: 20 INJECTION, SOLUTION INFILTRATION; PERINEURAL at 12:55

## 2025-01-31 RX ADMIN — Medication 10 ML: at 13:07

## 2025-01-31 ASSESSMENT — PAIN - FUNCTIONAL ASSESSMENT
PAIN_FUNCTIONAL_ASSESSMENT: 0-10
PAIN_FUNCTIONAL_ASSESSMENT: 0-10

## 2025-01-31 ASSESSMENT — ENCOUNTER SYMPTOMS: SHORTNESS OF BREATH: 1

## 2025-01-31 NOTE — ANESTHESIA POSTPROCEDURE EVALUATION
Department of Anesthesiology  Postprocedure Note    Patient: Juan Allen  MRN: [id number]  YOB: 1975  Date of evaluation: 1/31/2025    Procedure Summary       Date: 01/31/25 Room / Location: [facility] OR 01 / [facility]    Anesthesia Start: 1252 Anesthesia Stop: 1309    Procedure: ESOPHAGOGASTRODUODENOSCOPY Diagnosis:       Duodenal ulcer      (Duodenal ulcer [K26.9])    Surgeons: Shreya Correa MD Responsible Provider: Bhumika Ellington APRN - CRNA    Anesthesia Type: MAC ASA Status: 3            Anesthesia Type: No value filed.    Shara Phase I: Shara Score: 10    Shara Phase II:      Anesthesia Post Evaluation    Patient location during evaluation: PACU  Patient participation: complete - patient participated  Level of consciousness: sleepy but conscious  Pain score: 0  Airway patency: patent  Nausea & Vomiting: no nausea and no vomiting  Cardiovascular status: hemodynamically stable  Respiratory status: acceptable  Hydration status: euvolemic  Pain management: adequate        No notable events documented.

## 2025-01-31 NOTE — ANESTHESIA PRE PROCEDURE
Current Outpatient Medications   Medication Sig Dispense Refill    pantoprazole (PROTONIX) 40 MG tablet Take 1 tablet by mouth 2 times daily (before meals) 60 tablet 1    simvastatin (ZOCOR) 20 MG tablet Take 1 tablet by mouth nightly 90 tablet 1    triamcinolone (KENALOG) 0.025 % cream Apply Topically 454 g 5    tiZANidine (ZANAFLEX) 4 MG tablet TAKE ONE TABLET BY MOUTH EVERY 8 HOURS AS NEEDED. do not exceed 3 doses in 24 hours      lisinopril (PRINIVIL;ZESTRIL) 20 MG tablet Take 1 tablet by mouth daily 30 tablet 3    insulin lispro (HUMALOG,ADMELOG) 100 UNIT/ML SOLN injection vial INJECT SUBCUTANEOUSLY PER DAY VIA PUMP. MAX  UNITS PER DAY 60 mL 3    dorzolamide (TRUSOPT) 2 % ophthalmic solution INSTILL 1 DROPS INTO EACH EYE TWICE DAILY      timolol (TIMOPTIC) 0.5 % ophthalmic solution INSTILL ONE DROP INTO BOTH EYES TWICE DAILY      brimonidine (ALPHAGAN) 0.2 % ophthalmic solution INSTILL ONE DROP INTO EACH EYE TWICE DAILY  6    gabapentin (NEURONTIN) 600 MG tablet TAKE ONE TABLET BY MOUTH FOUR TIMES A DAY AS NEEDED  3    Insulin Pump Accessories MISC by Does not apply route      traMADol (ULTRAM) 50 MG tablet take one or two tabs three times a day with Tylenol         Allergies:  No Known Allergies    Problem List:    Patient Active Problem List   Diagnosis Code    Type 1 diabetes mellitus without complication ([facility]) E10.9    Hypercholesteremia E78.00    Diabetic retinopathy ([facility]) E11.319    Chest pain of uncertain etiology R07.9    Dizziness R42    SOB (shortness of breath) R06.02    Melena K92.1    Duodenal ulcer K26.9    Hypercoagulable state ([facility]) D68.59       Past Medical History:        Diagnosis Date    Acute duodenal ulcer with bleeding 12/03/2024    3 ulcers    Chest pain of uncertain etiology 09/27/2017    Dizziness 09/28/2017    DVT of leg (deep venous thrombosis) ([facility])     x2    Hyperlipidemia     Hypertension     Retinopathy due to secondary diabetes mellitus ([facility])     Rheumatoid arthritis

## 2025-01-31 NOTE — H&P
INTO EACH EYE TWICE DAILY 1/10/24  Yes Sudeep Arriaga MD   timolol (TIMOPTIC) 0.5 % ophthalmic solution INSTILL ONE DROP INTO BOTH EYES TWICE DAILY 1/15/24  Yes Sudeep Arriaga MD   brimonidine (ALPHAGAN) 0.2 % ophthalmic solution INSTILL ONE DROP INTO EACH EYE TWICE DAILY 10/20/19  Yes Sudeep Arriaga MD   gabapentin (NEURONTIN) 600 MG tablet TAKE ONE TABLET BY MOUTH FOUR TIMES A DAY AS NEEDED 11/17/19  Yes Sudeep Arriaga MD   Insulin Pump Accessories MISC by Does not apply route   Yes Sudeep Arriaga MD   traMADol (ULTRAM) 50 MG tablet take one or two tabs three times a day with Tylenol 6/20/03  Yes Sudeep Arriaga MD   insulin lispro (HUMALOG,ADMELOG) 100 UNIT/ML SOLN injection vial INJECT SUBCUTANEOUSLY PER DAY VIA PUMP. MAX  UNITS PER DAY 10/28/24   Carlos A Middleton MD       Allergies: No Known Allergies     History of allergic reaction to anesthesia:  No    Past Medical History:  Past Medical History:   Diagnosis Date    Acute duodenal ulcer with bleeding 12/03/2024    3 ulcers    Chest pain of uncertain etiology 09/27/2017    Dizziness 09/28/2017    DVT of leg (deep venous thrombosis) (HCC)     x2    Hyperlipidemia     Hypertension     Retinopathy due to secondary diabetes mellitus (HCC)     Rheumatoid arthritis (HCC)     [facility]    Smoker     quit 2008    SOB (shortness of breath) 09/28/2017    Stroke ([facility])     bleed    Type 1 diabetes mellitus, uncontrolled        Past Surgical History:  Past Surgical History:   Procedure Laterality Date    INVASIVE VASCULAR N/A 12/9/2024    Vena cava filter insertion - cath lab performed by Matthew Mary DO at [facility] [place] CARDIAC CATH LAB    UPPER GASTROINTESTINAL ENDOSCOPY N/A 12/6/2024    ESOPHAGOGASTRODUODENOSCOPY performed by Natanael Martines MD at [facility] [place] [facility]STRO [facility]       Social History:  Social History     Tobacco Use    Smoking status: Former     Current packs/day: 0.00     Types: Cigarettes     Quit date: 2007

## 2025-02-06 ENCOUNTER — OFFICE VISIT (OUTPATIENT)
Dept: CARDIOLOGY CLINIC | Age: 50
End: 2025-02-06
Payer: MEDICARE

## 2025-02-06 ENCOUNTER — TELEPHONE (OUTPATIENT)
Dept: CARDIOLOGY CLINIC | Age: 50
End: 2025-02-06

## 2025-02-06 VITALS
DIASTOLIC BLOOD PRESSURE: 70 MMHG | SYSTOLIC BLOOD PRESSURE: 130 MMHG | WEIGHT: 189 LBS | HEART RATE: 89 BPM | BODY MASS INDEX: 27.91 KG/M2

## 2025-02-06 DIAGNOSIS — R00.2 PALPITATIONS: ICD-10-CM

## 2025-02-06 DIAGNOSIS — E78.00 HYPERCHOLESTEREMIA: ICD-10-CM

## 2025-02-06 DIAGNOSIS — E78.00 HYPERCHOLESTEREMIA: Primary | ICD-10-CM

## 2025-02-06 LAB
ANION GAP SERPL CALCULATED.3IONS-SCNC: 11 MEQ/L (ref 9–15)
BUN SERPL-MCNC: 10 MG/DL (ref 6–20)
CALCIUM SERPL-MCNC: 9.4 MG/DL (ref 8.5–9.9)
CHLORIDE SERPL-SCNC: 103 MEQ/L (ref 95–107)
CO2 SERPL-SCNC: 28 MEQ/L (ref 20–31)
CREAT SERPL-MCNC: 0.55 MG/DL (ref 0.7–1.2)
ERYTHROCYTE [DISTWIDTH] IN BLOOD BY AUTOMATED COUNT: 13.3 % (ref 11.5–14.5)
GLUCOSE SERPL-MCNC: 183 MG/DL (ref 70–99)
HCT VFR BLD AUTO: 40.7 % (ref 42–52)
HGB BLD-MCNC: 13 G/DL (ref 14–18)
INR PPP: 1
MCH RBC QN AUTO: 28.1 PG (ref 27–31.3)
MCHC RBC AUTO-ENTMCNC: 31.9 % (ref 33–37)
MCV RBC AUTO: 87.9 FL (ref 79–92.2)
PLATELET # BLD AUTO: 225 K/UL (ref 130–400)
POTASSIUM SERPL-SCNC: 4.5 MEQ/L (ref 3.4–4.9)
PROTHROMBIN TIME: 13.9 SEC (ref 12.3–14.9)
RBC # BLD AUTO: 4.63 M/UL (ref 4.7–6.1)
SODIUM SERPL-SCNC: 142 MEQ/L (ref 135–144)
WBC # BLD AUTO: 4.4 K/UL (ref 4.8–10.8)

## 2025-02-06 PROCEDURE — 99204 OFFICE O/P NEW MOD 45 MIN: CPT | Performed by: INTERNAL MEDICINE

## 2025-02-06 RX ORDER — SODIUM CHLORIDE 9 MG/ML
INJECTION, SOLUTION INTRAVENOUS CONTINUOUS
OUTPATIENT
Start: 2025-02-06

## 2025-02-06 RX ORDER — DIPHENHYDRAMINE HCL 25 MG
50 TABLET ORAL ONCE
OUTPATIENT
Start: 2025-02-06 | End: 2025-02-06

## 2025-02-06 RX ORDER — ONDANSETRON 2 MG/ML
4 INJECTION INTRAMUSCULAR; INTRAVENOUS EVERY 6 HOURS PRN
OUTPATIENT
Start: 2025-02-06

## 2025-02-06 RX ORDER — NITROGLYCERIN 0.4 MG/1
0.4 TABLET SUBLINGUAL EVERY 5 MIN PRN
OUTPATIENT
Start: 2025-02-06

## 2025-02-06 RX ORDER — PREDNISONE 5 MG/1
50 TABLET ORAL ONCE
OUTPATIENT
Start: 2025-02-06 | End: 2025-02-06

## 2025-02-06 RX ORDER — HYDROCORTISONE SODIUM SUCCINATE 100 MG/2ML
200 INJECTION INTRAMUSCULAR; INTRAVENOUS ONCE
OUTPATIENT
Start: 2025-02-06 | End: 2025-02-06

## 2025-02-06 RX ORDER — RIVAROXABAN 10 MG/1
10 TABLET, FILM COATED ORAL
Qty: 90 TABLET | Refills: 3 | Status: SHIPPED | OUTPATIENT
Start: 2025-02-06 | End: 2025-02-07 | Stop reason: SDUPTHER

## 2025-02-06 ASSESSMENT — ENCOUNTER SYMPTOMS
GASTROINTESTINAL NEGATIVE: 1
NAUSEA: 0
VOMITING: 0
EYES NEGATIVE: 1
WHEEZING: 0
ABDOMINAL PAIN: 0
ALLERGIC/IMMUNOLOGIC NEGATIVE: 1
SHORTNESS OF BREATH: 0

## 2025-02-06 NOTE — PROGRESS NOTES
Chief Complaint   Patient presents with    Establish Cardiologist     IVC OBSTRUCTION       December 2024Juan Allen is a 49 y.o. male with history of DVT and CVA on chronic warfarin therapy admitted with acute GI bleed.  He is status post duodenal clip.  His INR was 2.6 on admission.  He was seen by hematology who recommended IVC filter as he will need to be off anticoagulation for the next couple weeks.  Currently, the patient denies any chest pain or shortness of breath.  He denies any recent issues with DVT or PE as he has been generally compliant with anticoagulation.       2-6-25: Patient presents for initial medical evaluation. Patient is followed on a regular basis by Jimenez Martines MD. patient with past medical tree of thromboembolic disease.      Status post recent hospitalization at [facility] for GI bleed in December 2024.  Patient had IVC filter placed.  Patient had repeat EGD recently with no obvious bleeding.  Was cleared to resume anticoagulation following with heme-onc and states that if EGD was negative okay to start Xarelto 10 mg daily for indefinitely.  Also hoping for IVC filter retrieval.  Status post echocardiogram on December 9, 2024 ejection fraction of 60 to 65%.  There was turbulence noted across the LVOT however no significant gradient.  C/o heart racing.           Patient Active Problem List   Diagnosis    Type 1 diabetes mellitus without complication (HCC)    Hypercholesteremia    Diabetic retinopathy (HCC)    Chest pain of uncertain etiology    Dizziness    SOB (shortness of breath)    Melena    Duodenal ulcer    Hypercoagulable state (HCC)       Past Surgical History:   Procedure Laterality Date    INVASIVE VASCULAR N/A 12/9/2024    Vena cava filter insertion - cath lab performed by Matthew Mary DO at [facility] [place] CARDIAC CATH LAB    UPPER GASTROINTESTINAL ENDOSCOPY N/A 12/6/2024    ESOPHAGOGASTRODUODENOSCOPY performed by Natanael Martines MD at [facility] [place] [facility]STR[facility]    UPPER

## 2025-02-06 NOTE — TELEPHONE ENCOUNTER
Pt called in and states that the xarelto is over $200 he cannot afford it. Pt needs something cheaper. Please advise

## 2025-02-07 ENCOUNTER — TELEPHONE (OUTPATIENT)
Dept: CARDIOLOGY CLINIC | Age: 50
End: 2025-02-07

## 2025-02-07 RX ORDER — RIVAROXABAN 10 MG/1
10 TABLET, FILM COATED ORAL
Qty: 90 TABLET | Refills: 3 | Status: SHIPPED | OUTPATIENT
Start: 2025-02-07

## 2025-02-07 NOTE — TELEPHONE ENCOUNTER
Called and spoke to patient. Patient applied for the discount program on the Xarelto website. Patient states that he can get his Xarelto filled for $10/month using the discount program on the Xarelto website. Patient will get the Xarelto filled from [facility] today. If he has any trouble, he will call back to the office. He would like the information on [facility] if he is still unable to get the Xarelto filled at [facility].

## 2025-02-07 NOTE — TELEPHONE ENCOUNTER
Requesting medication refill. Please approve or deny this request.    Rx requested:  Requested Prescriptions     Pending Prescriptions Disp Refills    rivaroxaban (XARELTO) 10 MG TABS tablet 90 tablet 3     Sig: Take 1 tablet by mouth daily (with breakfast)         Last Office Visit:   2/6/2025      Next Visit Date:  Future Appointments   Date Time Provider Department Center   3/6/2025  2:30 PM HolMatthew arias DO [place] [facility]   4/29/2025  4:15 PM Carlos A Middleton MD [place] [facility]   7/7/2025  4:00 PM Shreya Correa MD [place] [facility]

## 2025-02-12 ENCOUNTER — TELEPHONE (OUTPATIENT)
Dept: CARDIOLOGY CLINIC | Age: 50
End: 2025-02-12

## 2025-02-12 PROCEDURE — 99284 EMERGENCY DEPT VISIT MOD MDM: CPT

## 2025-02-12 NOTE — TELEPHONE ENCOUNTER
Right leg swollen, tightness, and pain.   Has DVT filter wants to know if he should go to ER. Please advise.  Says its ok to leave detailed message if he doesn't answer.

## 2025-02-13 ENCOUNTER — HOSPITAL ENCOUNTER (EMERGENCY)
Age: 50
Discharge: HOME | End: 2025-02-13
Payer: MEDICARE

## 2025-02-13 ENCOUNTER — APPOINTMENT (OUTPATIENT)
Dept: ULTRASOUND IMAGING | Age: 50
End: 2025-02-13
Payer: MEDICARE

## 2025-02-13 VITALS
WEIGHT: 189.8 LBS | SYSTOLIC BLOOD PRESSURE: 130 MMHG | TEMPERATURE: 98.4 F | HEART RATE: 107 BPM | RESPIRATION RATE: 14 BRPM | HEIGHT: 69 IN | BODY MASS INDEX: 28.11 KG/M2 | OXYGEN SATURATION: 96 % | DIASTOLIC BLOOD PRESSURE: 77 MMHG

## 2025-02-13 DIAGNOSIS — I82.411 ACUTE DEEP VEIN THROMBOSIS (DVT) OF FEMORAL VEIN OF RIGHT LOWER EXTREMITY (HCC): Primary | ICD-10-CM

## 2025-02-13 LAB
ALBUMIN SERPL-MCNC: 4.4 G/DL (ref 3.5–4.6)
ALP SERPL-CCNC: 66 U/L (ref 35–104)
ALT SERPL-CCNC: 11 U/L (ref 0–41)
ANION GAP SERPL CALCULATED.3IONS-SCNC: 10 MEQ/L (ref 9–15)
AST SERPL-CCNC: 14 U/L (ref 0–40)
BASOPHILS # BLD: 0 K/UL (ref 0–0.2)
BASOPHILS NFR BLD: 0.2 %
BILIRUB SERPL-MCNC: <0.2 MG/DL (ref 0.2–0.7)
BUN SERPL-MCNC: 16 MG/DL (ref 6–20)
CALCIUM SERPL-MCNC: 9.3 MG/DL (ref 8.5–9.9)
CHLORIDE SERPL-SCNC: 103 MEQ/L (ref 95–107)
CO2 SERPL-SCNC: 27 MEQ/L (ref 20–31)
CREAT SERPL-MCNC: 0.5 MG/DL (ref 0.7–1.2)
EKG ATRIAL RATE: 112 BPM
EKG P AXIS: 51 DEGREES
EKG P-R INTERVAL: 160 MS
EKG Q-T INTERVAL: 336 MS
EKG QRS DURATION: 84 MS
EKG QTC CALCULATION (BAZETT): 458 MS
EKG R AXIS: 64 DEGREES
EKG T AXIS: 21 DEGREES
EKG VENTRICULAR RATE: 112 BPM
EOSINOPHIL # BLD: 0.2 K/UL (ref 0–0.7)
EOSINOPHIL NFR BLD: 2.7 %
ERYTHROCYTE [DISTWIDTH] IN BLOOD BY AUTOMATED COUNT: 13.3 % (ref 11.5–14.5)
GLOBULIN SER CALC-MCNC: 2.7 G/DL (ref 2.3–3.5)
GLUCOSE SERPL-MCNC: 216 MG/DL (ref 70–99)
HCT VFR BLD AUTO: 41.1 % (ref 42–52)
HGB BLD-MCNC: 13.5 G/DL (ref 14–18)
LYMPHOCYTES # BLD: 1.5 K/UL (ref 1–4.8)
LYMPHOCYTES NFR BLD: 24.2 %
MAGNESIUM SERPL-MCNC: 2.2 MG/DL (ref 1.7–2.4)
MCH RBC QN AUTO: 29.2 PG (ref 27–31.3)
MCHC RBC AUTO-ENTMCNC: 32.8 % (ref 33–37)
MCV RBC AUTO: 88.8 FL (ref 79–92.2)
MONOCYTES # BLD: 0.8 K/UL (ref 0.2–0.8)
MONOCYTES NFR BLD: 13.2 %
NEUTROPHILS # BLD: 3.7 K/UL (ref 1.4–6.5)
NEUTS SEG NFR BLD: 59.5 %
PLATELET # BLD AUTO: 218 K/UL (ref 130–400)
POTASSIUM SERPL-SCNC: 4.5 MEQ/L (ref 3.4–4.9)
PROT SERPL-MCNC: 7.1 G/DL (ref 6.3–8)
RBC # BLD AUTO: 4.63 M/UL (ref 4.7–6.1)
SODIUM SERPL-SCNC: 140 MEQ/L (ref 135–144)
TSH REFLEX: 1.57 UIU/ML (ref 0.44–3.86)
WBC # BLD AUTO: 6.2 K/UL (ref 4.8–10.8)

## 2025-02-13 PROCEDURE — 93005 ELECTROCARDIOGRAM TRACING: CPT | Performed by: PERSONAL EMERGENCY RESPONSE ATTENDANT

## 2025-02-13 PROCEDURE — 84443 ASSAY THYROID STIM HORMONE: CPT

## 2025-02-13 PROCEDURE — 6370000000 HC RX 637 (ALT 250 FOR IP): Performed by: PERSONAL EMERGENCY RESPONSE ATTENDANT

## 2025-02-13 PROCEDURE — 2580000003 HC RX 258: Performed by: PERSONAL EMERGENCY RESPONSE ATTENDANT

## 2025-02-13 PROCEDURE — 36415 COLL VENOUS BLD VENIPUNCTURE: CPT

## 2025-02-13 PROCEDURE — 85025 COMPLETE CBC W/AUTO DIFF WBC: CPT

## 2025-02-13 PROCEDURE — 80053 COMPREHEN METABOLIC PANEL: CPT

## 2025-02-13 PROCEDURE — 93971 EXTREMITY STUDY: CPT

## 2025-02-13 PROCEDURE — 83735 ASSAY OF MAGNESIUM: CPT

## 2025-02-13 RX ORDER — 0.9 % SODIUM CHLORIDE 0.9 %
1000 INTRAVENOUS SOLUTION INTRAVENOUS ONCE
Status: COMPLETED | OUTPATIENT
Start: 2025-02-13 | End: 2025-02-13

## 2025-02-13 RX ADMIN — RIVAROXABAN 10 MG: 10 TABLET, FILM COATED ORAL at 02:47

## 2025-02-13 RX ADMIN — SODIUM CHLORIDE 1000 ML: 9 INJECTION, SOLUTION INTRAVENOUS at 02:23

## 2025-02-13 ASSESSMENT — ENCOUNTER SYMPTOMS
RHINORRHEA: 0
COUGH: 0
DIARRHEA: 0
BLOOD IN STOOL: 0
SHORTNESS OF BREATH: 0
VOMITING: 0
SORE THROAT: 0
COLOR CHANGE: 0
NAUSEA: 0
ABDOMINAL PAIN: 0

## 2025-02-13 NOTE — ED PROVIDER NOTES
EXAM         ED Triage Vitals [02/12/25 2127]   BP Systolic BP Percentile Diastolic BP Percentile Temp Temp src Pulse Respirations SpO2   (!) 184/109 -- -- 98.4 °F (36.9 °C) -- (!) 118 20 96 %      Height Weight - Scale         1.753 m (5' 9\") 86.1 kg (189 lb 12.8 oz)             Physical Exam  Constitutional:       Appearance: He is well-developed.   HENT:      Head: Normocephalic and atraumatic.   Eyes:      Conjunctiva/sclera: Conjunctivae normal.      Pupils: Pupils are equal, round, and reactive to light.   Neck:      Trachea: No tracheal deviation.   Cardiovascular:      Heart sounds: Normal heart sounds.      Comments: RLE with moderate swelling +2 pitting edema and TTP to proximal medial thigh. Cap refill < 2 seconds and strong DPP   Pulmonary:      Effort: Pulmonary effort is normal. No respiratory distress.      Breath sounds: Normal breath sounds. No stridor.   Abdominal:      General: Bowel sounds are normal. There is no distension.      Palpations: Abdomen is soft. There is no mass.      Tenderness: There is no abdominal tenderness. There is no guarding or rebound.   Musculoskeletal:         General: Normal range of motion.      Cervical back: Normal range of motion and neck supple.   Skin:     General: Skin is warm and dry.      Capillary Refill: Capillary refill takes less than 2 seconds.      Findings: No rash.   Neurological:      Mental Status: He is alert and oriented to person, place, and time.      Deep Tendon Reflexes: Reflexes are normal and symmetric.   Psychiatric:         Behavior: Behavior normal.         Thought Content: Thought content normal.         Judgment: Judgment normal.         DIAGNOSTIC RESULTS     EKG:All EKG's are interpreted by the Emergency Department Physician who either signs or Co-signs this chart in the absence of a cardiologist.    Personal interpretation:    Sinus tachycardia, rate 112, normal intervals, normal axis, no ST segment changes    RADIOLOGY:   Non-plain  film images such as CT, Ultrasound and MRI are read by theradiologist. Plain radiographic images are visualized and preliminarily interpreted by the emergency physician with the below findings:    Interpretation per theRadiologist below, if available at the time of this note:    Vascular duplex lower extremity venous right   Final Result   Right lower extremity DVT involving the common femoral vein through the   popliteal vein.      Critical results were called by Dr. Farhana Sadler MD to Agustina FERRARI on   2/13/2025 at 01:50.                 LABS:  Labs Reviewed   CBC WITH AUTO DIFFERENTIAL - Abnormal; Notable for the following components:       Result Value    RBC 4.63 (*)     Hemoglobin 13.5 (*)     Hematocrit 41.1 (*)     MCHC 32.8 (*)     All other components within normal limits   COMPREHENSIVE METABOLIC PANEL - Abnormal; Notable for the following components:    Glucose 216 (*)     Creatinine 0.50 (*)     All other components within normal limits   MAGNESIUM   TSH REFLEX TO FT4       All other labs were within normal range or not returned as of this dictation.    EMERGENCY DEPARTMENT COURSE and DIFFERENTIAL DIAGNOSIS/MDM:   Vitals:    Vitals:    02/12/25 2127 02/13/25 0300 02/13/25 0315 02/13/25 0318   BP: (!) 184/109 130/77     Pulse: (!) 118 (!) 110 (!) 108 (!) 107   Resp: 20 14     Temp: 98.4 °F (36.9 °C)      SpO2: 96% 96%     Weight: 86.1 kg (189 lb 12.8 oz)      Height: 1.753 m (5' 9\")            MDM    Patient presents with right lower extremity swelling    Radiologist interpreting:  Right lower extremity venous ultrasound shows DVT involving the common femoral vein through the popliteal vein    Glucose 216    Given 1 L IV fluid and started on Xarelto    Patient does not want to be admitted to the hospital.  He is [facility] but states that he will pay the more expensive monthly fee for Xarelto for 1 month so he can be discharged.  He is aware to monitor for any sites of bleeding and

## 2025-02-13 NOTE — ED TRIAGE NOTES
Patient has history of DVT has green filter but is not on blood thinners because he has a bleeding ulcer. Patients right leg from hip down  is swelling

## 2025-02-13 NOTE — ED PROVIDER NOTES
Basic Information   Time Seen: 9:48 PM   Primary Care Provider: Jimenez Porter MD     Chief Complaint   Patient presents with    Leg Pain     Right leg pain and swelling      HPI   Juan Allen is a 49 yrs male who presents with  leg pain and swelling.  Pt has hx dvt.  He has ivc filter in place.  Pt was taken of f his xarelto in December for GI bleed.  He was supposed to restart last week and is awaiting his mail away to arrive.  Sx times one week.  Pt sees dr holiday for dvt.  He had lab work one week ago.   Physical Exam     BP (!) 184/109 (02/12/25 2127)    Temp 98.4 °F (36.9 °C) (02/12/25 2127)    Pulse (!) 118 (02/12/25 2127)   Resp 20 (02/12/25 2127)    SpO2 96 % (02/12/25 2127)       General: Awake and Alert, no acute distress   CV: RRR, S1, S2   Resp: LCTAB, even and non labored   Other:leg edema/ + 1 palp at rt pulse.   Impression and Plan   Labs Reviewed - No data to display   leg pain  Vascular duplex lower extremity venous right    (Results Pending)      Final Impression  Leg pain   I have performed a medical screening exam on Juan Allen. Based on this patient's chief complaint/symptoms of   Chief Complaint   Patient presents with    Leg Pain     Right leg pain and swelling    and my focused exam, their care will be started and transitioned to provider when room is available      Broderick Quinn PA-C  02/12/25 [id number]

## 2025-02-24 DIAGNOSIS — E10.9 TYPE 1 DIABETES MELLITUS WITHOUT COMPLICATION (HCC): ICD-10-CM

## 2025-02-24 RX ORDER — DAPAGLIFLOZIN 10 MG/1
10 TABLET, FILM COATED ORAL EVERY MORNING
Qty: 90 TABLET | Refills: 3 | Status: SHIPPED | OUTPATIENT
Start: 2025-02-24 | End: 2025-02-24 | Stop reason: SDUPTHER

## 2025-02-24 RX ORDER — INSULIN LISPRO 100 [IU]/ML
INJECTION, SOLUTION INTRAVENOUS; SUBCUTANEOUS
Qty: 60 ML | Refills: 3 | Status: SHIPPED | OUTPATIENT
Start: 2025-02-24 | End: 2025-02-26 | Stop reason: SDUPTHER

## 2025-02-24 RX ORDER — DAPAGLIFLOZIN 10 MG/1
10 TABLET, FILM COATED ORAL EVERY MORNING
Qty: 100 TABLET | Refills: 3 | Status: SHIPPED | OUTPATIENT
Start: 2025-02-24

## 2025-02-26 DIAGNOSIS — R00.2 PALPITATIONS: ICD-10-CM

## 2025-02-26 DIAGNOSIS — E10.9 TYPE 1 DIABETES MELLITUS WITHOUT COMPLICATION (HCC): ICD-10-CM

## 2025-02-26 RX ORDER — INSULIN LISPRO 100 [IU]/ML
INJECTION, SOLUTION INTRAVENOUS; SUBCUTANEOUS
Qty: 60 ML | Refills: 3 | Status: SHIPPED | OUTPATIENT
Start: 2025-02-26

## 2025-03-06 ENCOUNTER — OFFICE VISIT (OUTPATIENT)
Dept: CARDIOLOGY CLINIC | Age: 50
End: 2025-03-06
Payer: MEDICARE

## 2025-03-06 VITALS
SYSTOLIC BLOOD PRESSURE: 136 MMHG | BODY MASS INDEX: 27.47 KG/M2 | WEIGHT: 186 LBS | DIASTOLIC BLOOD PRESSURE: 60 MMHG | OXYGEN SATURATION: 96 % | HEART RATE: 101 BPM

## 2025-03-06 DIAGNOSIS — I82.411 ACUTE DEEP VEIN THROMBOSIS (DVT) OF FEMORAL VEIN OF RIGHT LOWER EXTREMITY (HCC): Primary | ICD-10-CM

## 2025-03-06 DIAGNOSIS — I82.411 ACUTE DEEP VEIN THROMBOSIS (DVT) OF FEMORAL VEIN OF RIGHT LOWER EXTREMITY (HCC): ICD-10-CM

## 2025-03-06 DIAGNOSIS — R00.2 PALPITATIONS: ICD-10-CM

## 2025-03-06 DIAGNOSIS — E78.00 HYPERCHOLESTEREMIA: ICD-10-CM

## 2025-03-06 LAB
ANION GAP SERPL CALCULATED.3IONS-SCNC: 13 MEQ/L (ref 9–15)
BUN SERPL-MCNC: 14 MG/DL (ref 6–20)
CALCIUM SERPL-MCNC: 9.3 MG/DL (ref 8.5–9.9)
CHLORIDE SERPL-SCNC: 99 MEQ/L (ref 95–107)
CO2 SERPL-SCNC: 28 MEQ/L (ref 20–31)
CREAT SERPL-MCNC: 0.81 MG/DL (ref 0.7–1.2)
ERYTHROCYTE [DISTWIDTH] IN BLOOD BY AUTOMATED COUNT: 13.6 % (ref 11.5–14.5)
GLUCOSE SERPL-MCNC: 161 MG/DL (ref 70–99)
HCT VFR BLD AUTO: 46.8 % (ref 42–52)
HGB BLD-MCNC: 16 G/DL (ref 14–18)
INR PPP: 1.2
MCH RBC QN AUTO: 29.3 PG (ref 27–31.3)
MCHC RBC AUTO-ENTMCNC: 34.2 % (ref 33–37)
MCV RBC AUTO: 85.6 FL (ref 79–92.2)
PLATELET # BLD AUTO: 269 K/UL (ref 130–400)
POTASSIUM SERPL-SCNC: 4 MEQ/L (ref 3.4–4.9)
PROTHROMBIN TIME: 15.5 SEC (ref 12.3–14.9)
RBC # BLD AUTO: 5.47 M/UL (ref 4.7–6.1)
SODIUM SERPL-SCNC: 140 MEQ/L (ref 135–144)
WBC # BLD AUTO: 4.6 K/UL (ref 4.8–10.8)

## 2025-03-06 PROCEDURE — 99214 OFFICE O/P EST MOD 30 MIN: CPT | Performed by: INTERNAL MEDICINE

## 2025-03-06 RX ORDER — HYDROCORTISONE SODIUM SUCCINATE 100 MG/2ML
200 INJECTION INTRAMUSCULAR; INTRAVENOUS ONCE
OUTPATIENT
Start: 2025-03-06 | End: 2025-03-06

## 2025-03-06 RX ORDER — DIPHENHYDRAMINE HCL 25 MG
50 TABLET ORAL ONCE
OUTPATIENT
Start: 2025-03-06 | End: 2025-03-06

## 2025-03-06 RX ORDER — METOPROLOL TARTRATE 25 MG/1
25 TABLET, FILM COATED ORAL 2 TIMES DAILY
Qty: 180 TABLET | Refills: 3 | Status: SHIPPED | OUTPATIENT
Start: 2025-03-06

## 2025-03-06 RX ORDER — PREDNISONE 5 MG/1
50 TABLET ORAL ONCE
OUTPATIENT
Start: 2025-03-06 | End: 2025-03-06

## 2025-03-06 ASSESSMENT — ENCOUNTER SYMPTOMS
WHEEZING: 0
VOMITING: 0
GASTROINTESTINAL NEGATIVE: 1
EYES NEGATIVE: 1
SHORTNESS OF BREATH: 0
ABDOMINAL PAIN: 0
ALLERGIC/IMMUNOLOGIC NEGATIVE: 1
NAUSEA: 0

## 2025-03-06 NOTE — PROGRESS NOTES
Symptoms improving per patient. If still has extensive DVT then will need thrombectomy    Will add Lopressor 25 mg twice daily    Consider cardiac MRI to rule out LVOT obstruction    No mountain dew/caffeine.

## 2025-03-06 NOTE — H&P (VIEW-ONLY)
pain of uncertain etiology    Dizziness    SOB (shortness of breath)    Melena    Duodenal ulcer    Hypercoagulable state       Past Surgical History:   Procedure Laterality Date    INVASIVE VASCULAR N/A 2024    Vena cava filter insertion - cath lab [place] [facility] [place]    UPPER GASTROINTESTINAL ENDOSCOPY N/A 2024    ESOPHAGOGASTRODUODENOSCOPY performed by Natanael Martines MD at [facility]    UPPER GASTROINTESTINAL ENDOSCOPY N/A 2025    ESOPHAGOGASTRODUODENOSCOPY performed by Shreya Correa MD at [facility]       Social History     Socioeconomic History    Marital status:    Tobacco Use    Smoking status: Former     Current packs/day: 0.00     Average packs/day: 0.5 packs/day for 10.0 years (5.0 ttl pk-yrs)     Types: Cigarettes     Quit date:      Years since quittin.1     Passive exposure: Past    Smokeless tobacco: Never   Vaping Use    Vaping status: Never Used   Substance and Sexual Activity    Alcohol use: No    Drug use: No    Sexual activity: Yes     Partners: Female     Social Determinants of Health     Financial Resource Strain: Patient Declined (2024)    Overall Financial Resource Strain (CARDIA)     Difficulty of Paying Living Expenses: Patient declined   Food Insecurity: Patient Declined (2024)    Hunger Vital Sign     Worried About Running Out of Food in the Last Year: Patient declined     Ran Out of Food in the Last Year: Patient declined   Transportation Needs: Unknown (2024)    PRAPARE - Transportation     Lack of Transportation (Medical): No     Lack of Transportation (Non-Medical): Patient declined   Physical Activity: Sufficiently Active (2024)    Exercise Vital Sign     Days of Exercise per Week: 6 days     Minutes of Exercise per Session: 150+ min   Housing Stability: Unknown (2024)    Housing Stability Vital Sign     Unable to Pay for Housing in the Last Year: No     Number of

## 2025-03-06 NOTE — H&P (VIEW-ONLY)
Chief Complaint   Patient presents with    Results     ZIO MONITOR REPORT       December 2024Juan Allen is a 49 y.o. male with history of DVT and CVA on chronic warfarin therapy admitted with acute GI bleed.  He is status post duodenal clip.  His INR was 2.6 on admission.  He was seen by hematology who recommended IVC filter as he will need to be off anticoagulation for the next couple weeks.  Currently, the patient denies any chest pain or shortness of breath.  He denies any recent issues with DVT or PE as he has been generally compliant with anticoagulation.       2-6-25: Patient presents for initial medical evaluation. Patient is followed on a regular basis by Jimenez Martines MD. patient with past medical tree of thromboembolic disease.      Status post recent hospitalization at [facility] for GI bleed in December 2024.  Patient had IVC filter placed.  Patient had repeat EGD recently with no obvious bleeding.  Was cleared to resume anticoagulation following with heme-onc and states that if EGD was negative okay to start Xarelto 10 mg daily for indefinitely.  Also hoping for IVC filter retrieval.  Status post echocardiogram on December 9, 2024 ejection fraction of 60 to 65%.  There was turbulence noted across the LVOT however no significant gradient.  C/o heart racing.     3-6-25: Status post right extremity venous duplex ultrasound examination with positive right lower [place]ster DVT involving the right common femoral vein to the popliteal vein.  Patient with history of IVC filter placement.  Recent echo ejection fraction 60 to 65%.  Was turbulence noted across LVOT with no significant gradient  Patient does experience palpitations at times.  Status post 6-day event monitor with episode of sinus tachycardia heart rate 175 bpm.  Hx of GIB.           Patient Active Problem List   Diagnosis    Type 1 diabetes mellitus without complication (HCC)    Hypercholesteremia    Diabetic retinopathy (HCC)    Chest  Mental Status: He is alert and oriented to person, place, and time.      Cranial Nerves: No cranial nerve deficit.   Psychiatric:         Behavior: Behavior normal.         Thought Content: Thought content normal.         Judgment: Judgment normal.          Pertinent Labs:  CBC: No results for input(s): \"WBC\", \"HGB\", \"PLT\" in the last 72 hours.  BMP:No results for input(s): \"NA\", \"K\", \"CL\", \"CO2\", \"BUN\", \"CREATININE\", \"GLUCOSE\", \"LABGLOM\" in the last 72 hours.  INR: No results for input(s): \"INR\" in the last 72 hours.  BNP: No results for input(s): \"BNP\" in the last 72 hours.   TSH:   Lab Results   Component Value Date    TSH 1.570 02/13/2025      Cardiac Injury Profile: No results for input(s): \"CKTOTAL\", \"CKMB\", \"CKMBINDEX\", \"TROPONINI\" in the last 72 hours.   Lipid Profile:   Lab Results   Component Value Date/Time    TRIG 199 04/02/2022 10:06 AM    HDL 42 04/02/2022 10:06 AM    CHOL 167 04/02/2022 10:06 AM      Hemoglobin A1C: No components found for: \"HGBA1C\"         Orders Placed This Encounter   Procedures    Basic Metabolic Panel    CBC    Protime-INR    Vascular duplex lower extremity venous right       ASSESSMENT:     Diagnosis Orders   1. Acute deep vein thrombosis (DVT) of femoral vein of right lower extremity (HCC)  Basic Metabolic Panel    CBC    Protime-INR    Vascular duplex lower extremity venous right      2. Hypercholesteremia        3. Palpitations            3. IVCF present.     PLAN:       As always, aggressive risk factor modification is strongly recommended. We should adhere to the JNC VIII guidelines for HTN management and the NCEP ATP III guidelines for LDL-C management.    Cardiac diet is always recommended with low fat, cholesterol, calories and sodium.    Continue medications at current doses.     Follow-up with heme-onc    Increase xarelto to 20mg daily.     Plan for IVC filter retrieval now that patient is tolerating DOAC. No bleeding issues.     Plan for right LE venous duplex US.  Symptoms improving per patient. If still has extensive DVT then will need thrombectomy    Will add Lopressor 25 mg twice daily    Consider cardiac MRI to rule out LVOT obstruction    No mountain dew/caffeine.

## 2025-03-10 ENCOUNTER — TELEPHONE (OUTPATIENT)
Dept: CARDIOLOGY CLINIC | Age: 50
End: 2025-03-10

## 2025-03-10 DIAGNOSIS — Z86.718 PERSONAL HISTORY OF DVT (DEEP VEIN THROMBOSIS): Primary | ICD-10-CM

## 2025-03-10 PROBLEM — I82.409 DEEP VEIN THROMBOSIS (DVT) (HCC): Status: ACTIVE | Noted: 2025-03-10

## 2025-03-10 NOTE — TELEPHONE ENCOUNTER
LMOM PROCEDURE 03/14/25 2:30p arrive 12:30p NPO 8:30a hold XARELTO  03/12/25 - 03/14/25. Must have a .

## 2025-03-11 ENCOUNTER — TELEPHONE (OUTPATIENT)
Dept: CARDIOLOGY CLINIC | Age: 50
End: 2025-03-11

## 2025-03-11 NOTE — TELEPHONE ENCOUNTER
RECEIVED CALL FROM PATIENT. PATIENT WONDERING IF HE SHOULD HAVE HIS ULTRASOUND OF HIS LEGS DONE BEFORE HIS IVC FILTER REMOVAL.     CALLED ULTRASOUND, SPOKE TO GORDON. GORDON CHANGING PATIENT'S APPT TO 12PM SO HE CAN HAVE HIS ULTRASOUND DONE PRIOR TO HIS IVC FILTER REMOVAL.     SPOKE TO PATIENT. PATIENT STATES THAT HE WILL BE ABLE TO COME TO HIS ULTRASOUND APPT AT 12PM AND THEN HE WILL BE ABLE TO GO RIGHT TO HIS CATH LAB PROCEDURE.     NO ADDITIONAL QUESTIONS OR CONCERNS.

## 2025-03-13 NOTE — PROGRESS NOTES
Pt was called and is aware of procedure sherly afternoon. May have light breakfast, arrive at 1230PM, must have diver.

## 2025-03-14 ENCOUNTER — TELEPHONE (OUTPATIENT)
Dept: CARDIOLOGY CLINIC | Age: 50
End: 2025-03-14

## 2025-03-14 ENCOUNTER — APPOINTMENT (OUTPATIENT)
Dept: ULTRASOUND IMAGING | Age: 50
End: 2025-03-14
Attending: INTERNAL MEDICINE
Payer: MEDICARE

## 2025-03-14 ENCOUNTER — HOSPITAL ENCOUNTER (OUTPATIENT)
Dept: ULTRASOUND IMAGING | Age: 50
Discharge: HOME | End: 2025-03-16
Attending: INTERNAL MEDICINE
Payer: MEDICARE

## 2025-03-14 ENCOUNTER — HOSPITAL ENCOUNTER (OUTPATIENT)
Age: 50
Setting detail: OUTPATIENT SURGERY
Discharge: HOME | End: 2025-03-14
Attending: INTERNAL MEDICINE | Admitting: INTERNAL MEDICINE
Payer: MEDICARE

## 2025-03-14 VITALS
OXYGEN SATURATION: 93 % | DIASTOLIC BLOOD PRESSURE: 88 MMHG | HEART RATE: 81 BPM | RESPIRATION RATE: 13 BRPM | SYSTOLIC BLOOD PRESSURE: 128 MMHG | HEIGHT: 69 IN | WEIGHT: 185 LBS | BODY MASS INDEX: 27.4 KG/M2 | TEMPERATURE: 97.9 F

## 2025-03-14 DIAGNOSIS — I82.431 ACUTE DEEP VEIN THROMBOSIS (DVT) OF POPLITEAL VEIN OF RIGHT LOWER EXTREMITY (HCC): Primary | ICD-10-CM

## 2025-03-14 DIAGNOSIS — I82.409 DEEP VEIN THROMBOSIS (DVT) (HCC): ICD-10-CM

## 2025-03-14 DIAGNOSIS — I82.411 ACUTE DEEP VEIN THROMBOSIS (DVT) OF FEMORAL VEIN OF RIGHT LOWER EXTREMITY: ICD-10-CM

## 2025-03-14 LAB — ECHO BSA: 2.02 M2

## 2025-03-14 PROCEDURE — C1894 INTRO/SHEATH, NON-LASER: HCPCS | Performed by: INTERNAL MEDICINE

## 2025-03-14 PROCEDURE — 6360000002 HC RX W HCPCS: Performed by: INTERNAL MEDICINE

## 2025-03-14 PROCEDURE — 37193 REM ENDOVAS VENA CAVA FILTER: CPT | Performed by: INTERNAL MEDICINE

## 2025-03-14 PROCEDURE — 7100000010 HC PHASE II RECOVERY - FIRST 15 MIN: Performed by: INTERNAL MEDICINE

## 2025-03-14 PROCEDURE — 99152 MOD SED SAME PHYS/QHP 5/>YRS: CPT | Performed by: INTERNAL MEDICINE

## 2025-03-14 PROCEDURE — 99153 MOD SED SAME PHYS/QHP EA: CPT | Performed by: INTERNAL MEDICINE

## 2025-03-14 PROCEDURE — C1773 RET DEV, INSERTABLE: HCPCS | Performed by: INTERNAL MEDICINE

## 2025-03-14 PROCEDURE — 7100000011 HC PHASE II RECOVERY - ADDTL 15 MIN: Performed by: INTERNAL MEDICINE

## 2025-03-14 PROCEDURE — C1769 GUIDE WIRE: HCPCS | Performed by: INTERNAL MEDICINE

## 2025-03-14 PROCEDURE — 2709999900 HC NON-CHARGEABLE SUPPLY: Performed by: INTERNAL MEDICINE

## 2025-03-14 PROCEDURE — 93971 EXTREMITY STUDY: CPT

## 2025-03-14 RX ORDER — LIDOCAINE HYDROCHLORIDE 20 MG/ML
INJECTION, SOLUTION EPIDURAL; INFILTRATION; INTRACAUDAL; PERINEURAL PRN
Status: DISCONTINUED | OUTPATIENT
Start: 2025-03-14 | End: 2025-03-14 | Stop reason: HOSPADM

## 2025-03-14 RX ORDER — PREDNISONE 50 MG/1
50 TABLET ORAL ONCE
Status: DISCONTINUED | OUTPATIENT
Start: 2025-03-14 | End: 2025-03-14 | Stop reason: SDUPTHER

## 2025-03-14 RX ORDER — HEPARIN SODIUM 1000 [USP'U]/ML
INJECTION, SOLUTION INTRAVENOUS; SUBCUTANEOUS PRN
Status: DISCONTINUED | OUTPATIENT
Start: 2025-03-14 | End: 2025-03-14 | Stop reason: HOSPADM

## 2025-03-14 RX ORDER — SODIUM CHLORIDE 9 MG/ML
INJECTION, SOLUTION INTRAVENOUS CONTINUOUS
Status: DISCONTINUED | OUTPATIENT
Start: 2025-03-14 | End: 2025-03-14 | Stop reason: HOSPADM

## 2025-03-14 RX ORDER — FENTANYL CITRATE 50 UG/ML
INJECTION, SOLUTION INTRAMUSCULAR; INTRAVENOUS PRN
Status: DISCONTINUED | OUTPATIENT
Start: 2025-03-14 | End: 2025-03-14 | Stop reason: HOSPADM

## 2025-03-14 RX ORDER — ONDANSETRON 2 MG/ML
4 INJECTION INTRAMUSCULAR; INTRAVENOUS EVERY 6 HOURS PRN
Status: DISCONTINUED | OUTPATIENT
Start: 2025-03-14 | End: 2025-03-14 | Stop reason: HOSPADM

## 2025-03-14 RX ORDER — PREDNISONE 50 MG/1
50 TABLET ORAL ONCE
Status: DISCONTINUED | OUTPATIENT
Start: 2025-03-14 | End: 2025-03-14 | Stop reason: HOSPADM

## 2025-03-14 RX ORDER — HYDROCORTISONE SODIUM SUCCINATE 100 MG/2ML
200 INJECTION INTRAMUSCULAR; INTRAVENOUS ONCE
Status: DISCONTINUED | OUTPATIENT
Start: 2025-03-14 | End: 2025-03-14 | Stop reason: HOSPADM

## 2025-03-14 RX ORDER — NITROGLYCERIN 0.4 MG/1
0.4 TABLET SUBLINGUAL EVERY 5 MIN PRN
Status: DISCONTINUED | OUTPATIENT
Start: 2025-03-14 | End: 2025-03-14 | Stop reason: HOSPADM

## 2025-03-14 RX ORDER — DIPHENHYDRAMINE HCL 25 MG
50 TABLET ORAL ONCE
Status: DISCONTINUED | OUTPATIENT
Start: 2025-03-14 | End: 2025-03-14 | Stop reason: HOSPADM

## 2025-03-14 RX ORDER — HYDROCORTISONE SODIUM SUCCINATE 100 MG/2ML
200 INJECTION INTRAMUSCULAR; INTRAVENOUS ONCE
Status: DISCONTINUED | OUTPATIENT
Start: 2025-03-14 | End: 2025-03-14 | Stop reason: SDUPTHER

## 2025-03-14 RX ORDER — DIPHENHYDRAMINE HCL 25 MG
50 TABLET ORAL ONCE
Status: DISCONTINUED | OUTPATIENT
Start: 2025-03-14 | End: 2025-03-14 | Stop reason: SDUPTHER

## 2025-03-14 RX ORDER — MIDAZOLAM HYDROCHLORIDE 1 MG/ML
INJECTION, SOLUTION INTRAMUSCULAR; INTRAVENOUS PRN
Status: DISCONTINUED | OUTPATIENT
Start: 2025-03-14 | End: 2025-03-14 | Stop reason: HOSPADM

## 2025-03-14 NOTE — DISCHARGE INSTRUCTIONS
DVT Thrombectomy scheduled for Wednesday 3/19/2025.   You will receive a phone call with instructions for procedure.   Resume Xarelto this evening. Hold Xarelto on the day of the thrombectomy procedure.

## 2025-03-14 NOTE — TELEPHONE ENCOUNTER
----- Message from Dr. Matthew Mary DO sent at 3/14/2025  2:58 PM EDT -----  Please arrange for right lower extremity DVT thrombectomy on Monday or Wednesday 17th of the 19th via the right popliteal vein with ultrasound present.  Patient not to hold his oral anticoagulation the day after the procedure

## 2025-03-14 NOTE — INTERVAL H&P NOTE
Update History & Physical    The patient's History and Physical of March 6, 25 was reviewed with the patient and I examined the patient. There was no change. The surgical site was confirmed by the patient and me.     Plan: The risks, benefits, expected outcome, and alternative to the recommended procedure have been discussed with the patient. Patient understands and wants to proceed with the procedure.     Electronically signed by Matthew Mary DO on 3/14/2025 at 1:16 PM

## 2025-03-14 NOTE — PROGRESS NOTES
Arrived to pre/post cath from home, alert and oriented. Vital signs obtained. Consent for procedure signed. Prepping patient for procedure. Patient's wife is in waiting room.

## 2025-03-14 NOTE — PROGRESS NOTES
Returned from cath lab to pre/post cath, alert and oriented. R IJ remains stable, no bleeding or hematoma. Dressing remains clean, dry, intact. Vital signs stable. Patient is resting comfortably.     1610: Bedrest complete. R IJ remains stable, no bleeding or hematoma. Patient up getting dressed for discharge to home. IV removed. Discharge instructions reviewed with patient, verbalized understanding. Transported to outpatient surgery exit. Patient discharged to care of family. Patient's wife arrived to drive patient home.

## 2025-03-14 NOTE — PROGRESS NOTES
Sedation Pre- Procedure Evaluation    Patient name: Juan Allen  YOB: 1975  MRN: [id number]   Allergies:   Patient has no known allergies.        Type Of Sedation  []local anesthesia  [x]moderate (conscious sedation)  []deep/analgesia      RN Focused History    Yes        No  N/A  []   [x]  Previous problem with airway anesthesia, sedation, or family history of the same    [x]   []  History Of tobacco, alcohol, or substance abuse     []   [x]  Problems associated with stridor, snoring, or sleep apnea    []   [] [x] Pediatric patient, history of premature birth or ventilator support (Moderate Sedation Only)     [x]   [] [] Moderate Sedation: Clear liquids within 6 hours of sedation and NPO within 2 hours    []   [] [x] Deep Sedation:Clear liquids within 6 hours of sedation and NPO within 2 hours      NPO since: 3/14/2025 at 0730       Vitals, Cardiac Rhythm, Pain:   Vitals  Temp: 97.9 °F (36.6 °C)  Temp Source: Oral  Pulse: 79  Heart Rate Source: Monitor  Respirations: 15  BP: (!) 141/80  MAP (Calculated): 100  BP Location: Left upper arm  Patient Position: Semi fowlers  Cardiac Rhythm: Sinus rhythm  Pain Assessment  Pain Assessment: None - Denies Pain      EKG:  EKG Interpretation: normal EKG, normal sinus rhythm, normal sinus rhythm, unchanged from previous tracings.      Shara Scale: Activity: Able to move four extremities voluntarily or on command  Respiration: Able to breathe and cough freely  Circulation: Blood pressure within 20% of preanesthesia level  Consciousness: Fully awake  Oxygen: SAO2 > 92% on room air   Shara Score: 10     Activity:  2 - Able to move 4 extremities voluntarily on command  Respiration:  2 - Able to breathe deeply and cough freely  Circulation:  2 - BP+/- 20mmHg of normal  Consciousness:  2 - Fully awake  Oxygen Saturation (color):  2 - Able to maintain oxygen saturation >92% on room air      Prior to Admission medications    Medication Sig Start Date End Date

## 2025-03-14 NOTE — BRIEF OP NOTE
Section of Cardiology  Adult Brief Procedure Note        Procedure(s): IVC filter retrieval    Pre-operative Diagnosis: IVC filter present    H&P Status: Completed and reviewed.     Post-operative Diagnosis:      Findings:  See full report    Complications:  none    Primary Proceduralist:   Dr. Matthew Mary DO    Assessment successful IVC filter retrieval.  Continue with oral anticoagulation.  Right lower extremity DVT thrombectomy next week  Full procedure note to follow

## 2025-03-18 DIAGNOSIS — I10 PRIMARY HYPERTENSION: ICD-10-CM

## 2025-03-18 LAB — ECHO BSA: 2.02 M2

## 2025-03-18 RX ORDER — LISINOPRIL 20 MG/1
20 TABLET ORAL DAILY
Qty: 30 TABLET | Refills: 3 | Status: SHIPPED | OUTPATIENT
Start: 2025-03-18

## 2025-03-18 NOTE — PROGRESS NOTES
Pt was called and is aware of procedure sherly morning. Arrive at 730AM, NPO at midnight, must have .

## 2025-03-19 ENCOUNTER — HOSPITAL ENCOUNTER (OUTPATIENT)
Age: 50
Setting detail: OUTPATIENT SURGERY
Discharge: HOME | End: 2025-03-19
Attending: INTERNAL MEDICINE | Admitting: INTERNAL MEDICINE
Payer: MEDICARE

## 2025-03-19 ENCOUNTER — APPOINTMENT (OUTPATIENT)
Dept: ULTRASOUND IMAGING | Age: 50
End: 2025-03-19
Attending: INTERNAL MEDICINE
Payer: MEDICARE

## 2025-03-19 ENCOUNTER — APPOINTMENT (OUTPATIENT)
Age: 50
End: 2025-03-19
Attending: INTERNAL MEDICINE
Payer: MEDICARE

## 2025-03-19 VITALS
DIASTOLIC BLOOD PRESSURE: 86 MMHG | RESPIRATION RATE: 19 BRPM | HEART RATE: 87 BPM | OXYGEN SATURATION: 97 % | TEMPERATURE: 97.9 F | SYSTOLIC BLOOD PRESSURE: 143 MMHG

## 2025-03-19 DIAGNOSIS — I82.409 DVT (DEEP VENOUS THROMBOSIS) (HCC): ICD-10-CM

## 2025-03-19 DIAGNOSIS — I82.409 DEEP VEIN THROMBOSIS (DVT) (HCC): Primary | ICD-10-CM

## 2025-03-19 LAB
POC ACT LR: 261 SEC
POC ACT LR: 341 SEC

## 2025-03-19 PROCEDURE — 36005 INJECTION EXT VENOGRAPHY: CPT | Performed by: INTERNAL MEDICINE

## 2025-03-19 PROCEDURE — 85347 COAGULATION TIME ACTIVATED: CPT

## 2025-03-19 PROCEDURE — 7100000011 HC PHASE II RECOVERY - ADDTL 15 MIN: Performed by: INTERNAL MEDICINE

## 2025-03-19 PROCEDURE — C1757 CATH, THROMBECTOMY/EMBOLECT: HCPCS | Performed by: INTERNAL MEDICINE

## 2025-03-19 PROCEDURE — 75820 VEIN X-RAY ARM/LEG: CPT | Performed by: INTERNAL MEDICINE

## 2025-03-19 PROCEDURE — 93005 ELECTROCARDIOGRAM TRACING: CPT | Performed by: INTERNAL MEDICINE

## 2025-03-19 PROCEDURE — 76937 US GUIDE VASCULAR ACCESS: CPT

## 2025-03-19 PROCEDURE — 37187 VENOUS MECH THROMBECTOMY: CPT | Performed by: INTERNAL MEDICINE

## 2025-03-19 PROCEDURE — 99152 MOD SED SAME PHYS/QHP 5/>YRS: CPT | Performed by: INTERNAL MEDICINE

## 2025-03-19 PROCEDURE — 75822 VEIN X-RAY ARMS/LEGS: CPT | Performed by: INTERNAL MEDICINE

## 2025-03-19 PROCEDURE — 2709999900 HC NON-CHARGEABLE SUPPLY: Performed by: INTERNAL MEDICINE

## 2025-03-19 PROCEDURE — 75774 ARTERY X-RAY EACH VESSEL: CPT | Performed by: INTERNAL MEDICINE

## 2025-03-19 PROCEDURE — C1769 GUIDE WIRE: HCPCS | Performed by: INTERNAL MEDICINE

## 2025-03-19 PROCEDURE — 99153 MOD SED SAME PHYS/QHP EA: CPT | Performed by: INTERNAL MEDICINE

## 2025-03-19 PROCEDURE — 7100000010 HC PHASE II RECOVERY - FIRST 15 MIN: Performed by: INTERNAL MEDICINE

## 2025-03-19 PROCEDURE — 6360000004 HC RX CONTRAST MEDICATION: Performed by: INTERNAL MEDICINE

## 2025-03-19 PROCEDURE — 6360000002 HC RX W HCPCS: Performed by: INTERNAL MEDICINE

## 2025-03-19 PROCEDURE — C1753 CATH, INTRAVAS ULTRASOUND: HCPCS | Performed by: INTERNAL MEDICINE

## 2025-03-19 PROCEDURE — 36010 PLACE CATHETER IN VEIN: CPT | Performed by: INTERNAL MEDICINE

## 2025-03-19 PROCEDURE — 75825 VEIN X-RAY TRUNK: CPT | Performed by: INTERNAL MEDICINE

## 2025-03-19 PROCEDURE — C1887 CATHETER, GUIDING: HCPCS | Performed by: INTERNAL MEDICINE

## 2025-03-19 PROCEDURE — C1894 INTRO/SHEATH, NON-LASER: HCPCS | Performed by: INTERNAL MEDICINE

## 2025-03-19 PROCEDURE — 37248 TRLUML BALO ANGIOP 1ST VEIN: CPT | Performed by: INTERNAL MEDICINE

## 2025-03-19 PROCEDURE — 2580000003 HC RX 258: Performed by: INTERNAL MEDICINE

## 2025-03-19 PROCEDURE — C1725 CATH, TRANSLUMIN NON-LASER: HCPCS | Performed by: INTERNAL MEDICINE

## 2025-03-19 RX ORDER — ONDANSETRON 2 MG/ML
4 INJECTION INTRAMUSCULAR; INTRAVENOUS EVERY 6 HOURS PRN
Status: DISCONTINUED | OUTPATIENT
Start: 2025-03-19 | End: 2025-03-19 | Stop reason: HOSPADM

## 2025-03-19 RX ORDER — PREDNISONE 50 MG/1
50 TABLET ORAL ONCE
Status: DISCONTINUED | OUTPATIENT
Start: 2025-03-19 | End: 2025-03-19 | Stop reason: HOSPADM

## 2025-03-19 RX ORDER — MIDAZOLAM HYDROCHLORIDE 1 MG/ML
INJECTION, SOLUTION INTRAMUSCULAR; INTRAVENOUS PRN
Status: DISCONTINUED | OUTPATIENT
Start: 2025-03-19 | End: 2025-03-19 | Stop reason: HOSPADM

## 2025-03-19 RX ORDER — HYDROCORTISONE SODIUM SUCCINATE 100 MG/2ML
200 INJECTION INTRAMUSCULAR; INTRAVENOUS ONCE
Status: DISCONTINUED | OUTPATIENT
Start: 2025-03-19 | End: 2025-03-19 | Stop reason: HOSPADM

## 2025-03-19 RX ORDER — DIPHENHYDRAMINE HCL 25 MG
50 TABLET ORAL ONCE
Status: DISCONTINUED | OUTPATIENT
Start: 2025-03-19 | End: 2025-03-19 | Stop reason: HOSPADM

## 2025-03-19 RX ORDER — HEPARIN SODIUM 1000 [USP'U]/ML
INJECTION, SOLUTION INTRAVENOUS; SUBCUTANEOUS PRN
Status: DISCONTINUED | OUTPATIENT
Start: 2025-03-19 | End: 2025-03-19 | Stop reason: HOSPADM

## 2025-03-19 RX ORDER — FENTANYL CITRATE 50 UG/ML
INJECTION, SOLUTION INTRAMUSCULAR; INTRAVENOUS PRN
Status: DISCONTINUED | OUTPATIENT
Start: 2025-03-19 | End: 2025-03-19 | Stop reason: HOSPADM

## 2025-03-19 RX ORDER — IOPAMIDOL 612 MG/ML
INJECTION, SOLUTION INTRAVASCULAR PRN
Status: DISCONTINUED | OUTPATIENT
Start: 2025-03-19 | End: 2025-03-19 | Stop reason: HOSPADM

## 2025-03-19 RX ORDER — ENOXAPARIN SODIUM 100 MG/ML
1 INJECTION SUBCUTANEOUS ONCE
Status: COMPLETED | OUTPATIENT
Start: 2025-03-19 | End: 2025-03-19

## 2025-03-19 RX ORDER — SODIUM CHLORIDE 9 MG/ML
INJECTION, SOLUTION INTRAVENOUS
Status: DISCONTINUED
Start: 2025-03-19 | End: 2025-03-19 | Stop reason: HOSPADM

## 2025-03-19 RX ORDER — LIDOCAINE HYDROCHLORIDE 10 MG/ML
INJECTION, SOLUTION INFILTRATION; PERINEURAL PRN
Status: DISCONTINUED | OUTPATIENT
Start: 2025-03-19 | End: 2025-03-19 | Stop reason: HOSPADM

## 2025-03-19 RX ORDER — SODIUM CHLORIDE 9 MG/ML
INJECTION, SOLUTION INTRAVENOUS CONTINUOUS
Status: DISCONTINUED | OUTPATIENT
Start: 2025-03-19 | End: 2025-03-19 | Stop reason: HOSPADM

## 2025-03-19 RX ORDER — NITROGLYCERIN 0.4 MG/1
0.4 TABLET SUBLINGUAL EVERY 5 MIN PRN
Status: DISCONTINUED | OUTPATIENT
Start: 2025-03-19 | End: 2025-03-19 | Stop reason: HOSPADM

## 2025-03-19 RX ADMIN — SODIUM CHLORIDE: 0.9 INJECTION, SOLUTION INTRAVENOUS at 10:59

## 2025-03-19 RX ADMIN — ENOXAPARIN SODIUM 80 MG: 100 INJECTION SUBCUTANEOUS at 12:42

## 2025-03-19 NOTE — DISCHARGE INSTRUCTIONS
May resume xarelto at 1 am   Monitor sites behind knees for any bleeding. If sites begin to bleed apply pressure.   Remove dressings in the AM.

## 2025-03-19 NOTE — PROGRESS NOTES
Patient returned from procedure\. Denies any pain. Bilateral pedal pulses found on palpation. Bilateral popliteal sites with dressing dry and intact. No bleeding or hematoma noted at this time. Report received from Tricia.

## 2025-03-19 NOTE — PROGRESS NOTES
Patient arrived to pre/post cath lab. A+OX 4. Denies any pain at this time. Bilateral Wife Tena in the waiting room. Procedural consents reviewed and signed by patient. Patient being prepped for procedure.

## 2025-03-19 NOTE — PROGRESS NOTES
Sedation Pre- Procedure Evaluation    Patient name: Juan Allen  YOB: 1975  MRN: [id number]   Allergies:   Patient has no known allergies.        Type Of Sedation  [x]local anesthesia  [x]moderate (conscious sedation)  []deep/analgesia      RN Focused History    Yes        No  N/A  []   [x]  Previous problem with airway anesthesia, sedation, or family history of the same    [x]   []  History Of tobacco, alcohol, or substance abuse     []   [x]  Problems associated with stridor, snoring, or sleep apnea    []   [] [x] Pediatric patient, history of premature birth or ventilator support (Moderate Sedation Only)     [x]   [] [] Moderate Sedation: Clear liquids within 6 hours of sedation and NPO within 2 hours    []   [] [x] Deep Sedation:Clear liquids within 6 hours of sedation and NPO within 2 hours      NPO since: midnight.       Vitals, Cardiac Rhythm, Pain:   Pain Assessment  Pain Assessment: None - Denies Pain      EKG:  EKG Interpretation: normal EKG, normal sinus rhythm, unchanged from previous tracings.      Shara Scale: Activity: Able to move four extremities voluntarily or on command  Respiration: Able to breathe and cough freely  Circulation: Blood pressure within 20% of preanesthesia level  Consciousness: Fully awake  Oxygen: SAO2 > 92% on room air   Shara Score: 10     Activity:  2 - Able to move 4 extremities voluntarily on command  Respiration:  2 - Able to breathe deeply and cough freely  Circulation:  2 - BP+/- 20mmHg of normal  Consciousness:  2 - Fully awake  Oxygen Saturation (color):  2 - Able to maintain oxygen saturation >92% on room air      Prior to Admission medications    Medication Sig Start Date End Date Taking? Authorizing Provider   lisinopril (PRINIVIL;ZESTRIL) 20 MG tablet Take 1 tablet by mouth once daily 3/18/25  Yes Carlos A Middleton MD   metoprolol tartrate (LOPRESSOR) 25 MG tablet Take 1 tablet by mouth 2 times daily 3/6/25  Yes Matthew Mary DO   rivaroxaban

## 2025-03-19 NOTE — BRIEF OP NOTE
Section of Cardiology  Adult Brief LE angio Procedure Note        Procedure(s): Ultrasound-guided vascular access, moderate sedation.  Bilateral lower extremity ascending venography.  Right common femoral vein, femoral and popliteal vein mechanical DVT thrombectomy.  IVC mechanical thrombectomy.  intravascular ultrasound of the IVC as well as the right iliofemoral popliteal veins  PTA of the right femoral-popliteal vein    Pre-operative Diagnosis: Subacute/symptomatic right lower extremity DVT    H&P Status: Completed and reviewed.     Post-operative Diagnosis:      IVC venogram large thrombus burden in the mid IVC.  Right Leximenth ascending    Right lower extremity ascending venography thrombosis of the common femoral vein, femoral and popliteal veins     Left lower extremity ascending urography patent.  No thrombosis    Intravascular ultrasound of the IVC shows large thrombus burden in the midsegment.  Intravesical ultrasound of the right iliofemoral popliteal system shows thrombus in the right common femoral vein, popliteal and femoral vein.      Findings:  See full report    Complications:  none    Primary Proceduralist:   Dr. Matthew Mary DO    Plan: Full dose anticoagulation  Full procedure note to follow

## 2025-03-19 NOTE — INTERVAL H&P NOTE
Update History & Physical    The patient's History and Physical of March 6, 25 was reviewed with the patient and I examined the patient. There was no change. The surgical site was confirmed by the patient and me.     Plan: The risks, benefits, expected outcome, and alternative to the recommended procedure have been discussed with the patient. Patient understands and wants to proceed with the procedure.     Electronically signed by Matthew Mary DO on 3/19/2025 at 7:49 AM

## 2025-03-19 NOTE — PROGRESS NOTES
Flow stasis and stopcock removed from bilateral popliteal access sites. Gauze and Tegaderm placed at site. No bleeding or hematoma noted at this time. Patient denies any pain. Lovenox given subcutaneous. Bilateral peripheral Iv's removed with no complications. Discharge instructions revived with patient and patient verbalized understanding. Patient taken out by Joaquina via wheelchair.

## 2025-03-20 LAB
EKG ATRIAL RATE: 78 BPM
EKG P AXIS: 19 DEGREES
EKG P-R INTERVAL: 186 MS
EKG Q-T INTERVAL: 386 MS
EKG QRS DURATION: 88 MS
EKG QTC CALCULATION (BAZETT): 440 MS
EKG R AXIS: 76 DEGREES
EKG T AXIS: 60 DEGREES
EKG VENTRICULAR RATE: 78 BPM

## 2025-03-31 RX ORDER — PANTOPRAZOLE SODIUM 40 MG/1
TABLET, DELAYED RELEASE ORAL
Qty: 60 TABLET | Refills: 0 | Status: SHIPPED | OUTPATIENT
Start: 2025-03-31

## 2025-04-17 ENCOUNTER — OFFICE VISIT (OUTPATIENT)
Age: 50
End: 2025-04-17
Payer: MEDICARE

## 2025-04-17 VITALS
OXYGEN SATURATION: 98 % | SYSTOLIC BLOOD PRESSURE: 138 MMHG | RESPIRATION RATE: 16 BRPM | DIASTOLIC BLOOD PRESSURE: 88 MMHG | HEART RATE: 77 BPM

## 2025-04-17 DIAGNOSIS — I82.511 CHRONIC DEEP VEIN THROMBOSIS (DVT) OF FEMORAL VEIN OF RIGHT LOWER EXTREMITY: Primary | ICD-10-CM

## 2025-04-17 DIAGNOSIS — E78.00 HYPERCHOLESTEREMIA: ICD-10-CM

## 2025-04-17 DIAGNOSIS — Z86.73 HISTORY OF CARDIOEMBOLIC STROKE: ICD-10-CM

## 2025-04-17 PROCEDURE — 99214 OFFICE O/P EST MOD 30 MIN: CPT | Performed by: INTERNAL MEDICINE

## 2025-04-17 ASSESSMENT — ENCOUNTER SYMPTOMS
ALLERGIC/IMMUNOLOGIC NEGATIVE: 1
WHEEZING: 0
GASTROINTESTINAL NEGATIVE: 1
NAUSEA: 0
ABDOMINAL PAIN: 0
SHORTNESS OF BREATH: 0
EYES NEGATIVE: 1
VOMITING: 0

## 2025-04-17 NOTE — PROGRESS NOTES
Chief Complaint   Patient presents with    Follow-up     DVT SP PVI       December 2024Juan Allen is a 49 y.o. male with history of DVT and CVA on chronic warfarin therapy admitted with acute GI bleed.  He is status post duodenal clip.  His INR was 2.6 on admission.  He was seen by hematology who recommended IVC filter as he will need to be off anticoagulation for the next couple weeks.  Currently, the patient denies any chest pain or shortness of breath.  He denies any recent issues with DVT or PE as he has been generally compliant with anticoagulation.       2-6-25: Patient presents for initial medical evaluation. Patient is followed on a regular basis by Jimenez Martines MD. patient with past medical tree of thromboembolic disease.      Status post recent hospitalization at [facility] for GI bleed in December 2024.  Patient had IVC filter placed.  Patient had repeat EGD recently with no obvious bleeding.  Was cleared to resume anticoagulation following with heme-onc and states that if EGD was negative okay to start Xarelto 10 mg daily for indefinitely.  Also hoping for IVC filter retrieval.  Status post echocardiogram on December 9, 2024 ejection fraction of 60 to 65%.  There was turbulence noted across the LVOT however no significant gradient.  C/o heart racing.     3-6-25: Status post right extremity venous duplex ultrasound examination with positive right lower Ester DVT involving the right common femoral vein to the popliteal vein.  Patient with history of IVC filter placement.  Recent echo ejection fraction 60 to 65%.  Was turbulence noted across LVOT with no significant gradient  Patient does experience palpitations at times.  Status post 6-day event monitor with episode of sinus tachycardia heart rate 175 bpm.  Hx of GIB.     4-17-25: Status post right common femoral vein, femoral popliteal vein mechanical DVT as well as IVC mechanical thrombectomy.  PTA of the right femoral-popliteal vein.

## 2025-04-29 RX ORDER — PANTOPRAZOLE SODIUM 40 MG/1
40 TABLET, DELAYED RELEASE ORAL
Qty: 60 TABLET | Refills: 0 | Status: SHIPPED | OUTPATIENT
Start: 2025-04-29

## 2025-06-07 DIAGNOSIS — E10.9 TYPE 1 DIABETES MELLITUS WITHOUT COMPLICATION (HCC): ICD-10-CM

## 2025-06-07 LAB
ANION GAP SERPL CALCULATED.3IONS-SCNC: 10 MEQ/L (ref 9–15)
BUN SERPL-MCNC: 14 MG/DL (ref 6–20)
CALCIUM SERPL-MCNC: 9.2 MG/DL (ref 8.5–9.9)
CHLORIDE SERPL-SCNC: 106 MEQ/L (ref 95–107)
CHOLEST SERPL-MCNC: 165 MG/DL (ref 0–199)
CO2 SERPL-SCNC: 28 MEQ/L (ref 20–31)
CREAT SERPL-MCNC: 0.67 MG/DL (ref 0.7–1.2)
GLUCOSE SERPL-MCNC: 163 MG/DL (ref 70–99)
HDLC SERPL-MCNC: 43 MG/DL (ref 40–59)
LDLC SERPL CALC-MCNC: 92 MG/DL (ref 0–129)
POTASSIUM SERPL-SCNC: 4.6 MEQ/L (ref 3.4–4.9)
SODIUM SERPL-SCNC: 144 MEQ/L (ref 135–144)
TRIGL SERPL-MCNC: 149 MG/DL (ref 0–150)

## 2025-06-08 LAB
ESTIMATED AVERAGE GLUCOSE: 157 MG/DL
HBA1C MFR BLD: 7.1 % (ref 4–6)

## 2025-06-09 ENCOUNTER — OFFICE VISIT (OUTPATIENT)
Age: 50
End: 2025-06-09
Payer: MEDICARE

## 2025-06-09 VITALS
OXYGEN SATURATION: 95 % | DIASTOLIC BLOOD PRESSURE: 85 MMHG | WEIGHT: 180.78 LBS | HEIGHT: 69 IN | HEART RATE: 85 BPM | BODY MASS INDEX: 26.78 KG/M2 | SYSTOLIC BLOOD PRESSURE: 138 MMHG

## 2025-06-09 DIAGNOSIS — E10.3293 TYPE 1 DIABETES MELLITUS WITH MILD NONPROLIFERATIVE RETINOPATHY OF BOTH EYES WITHOUT MACULAR EDEMA (HCC): ICD-10-CM

## 2025-06-09 DIAGNOSIS — Z96.41 INSULIN PUMP IN PLACE: ICD-10-CM

## 2025-06-09 DIAGNOSIS — I10 PRIMARY HYPERTENSION: Primary | ICD-10-CM

## 2025-06-09 PROCEDURE — 3079F DIAST BP 80-89 MM HG: CPT | Performed by: INTERNAL MEDICINE

## 2025-06-09 PROCEDURE — 3075F SYST BP GE 130 - 139MM HG: CPT | Performed by: INTERNAL MEDICINE

## 2025-06-09 PROCEDURE — 3051F HG A1C>EQUAL 7.0%<8.0%: CPT | Performed by: INTERNAL MEDICINE

## 2025-06-09 PROCEDURE — 99214 OFFICE O/P EST MOD 30 MIN: CPT | Performed by: INTERNAL MEDICINE

## 2025-06-09 RX ORDER — LISINOPRIL 20 MG/1
20 TABLET ORAL DAILY
Qty: 30 TABLET | Refills: 3 | Status: SHIPPED | OUTPATIENT
Start: 2025-06-09

## 2025-06-09 RX ORDER — INSULIN LISPRO 100 [IU]/ML
INJECTION, SOLUTION INTRAVENOUS; SUBCUTANEOUS
Qty: 60 ML | Refills: 3 | Status: SHIPPED | OUTPATIENT
Start: 2025-06-09

## 2025-06-09 RX ORDER — DAPAGLIFLOZIN 10 MG/1
10 TABLET, FILM COATED ORAL EVERY MORNING
Qty: 100 TABLET | Refills: 3 | Status: SHIPPED | OUTPATIENT
Start: 2025-06-09

## 2025-06-09 RX ORDER — SIMVASTATIN 20 MG
20 TABLET ORAL NIGHTLY
Qty: 90 TABLET | Refills: 1 | Status: SHIPPED | OUTPATIENT
Start: 2025-06-09

## 2025-06-09 NOTE — PROGRESS NOTES
Right eye: No discharge.         Left eye: No discharge.      Extraocular Movements: Extraocular movements intact.      Conjunctiva/sclera: Conjunctivae normal.   Cardiovascular:      Rate and Rhythm: Normal rate.   Pulmonary:      Effort: Pulmonary effort is normal.   Musculoskeletal:         General: Normal range of motion.      Cervical back: Normal range of motion and neck supple.   Neurological:      General: No focal deficit present.      Mental Status: He is alert and oriented to person, place, and time.   Psychiatric:         Mood and Affect: Mood normal.         Behavior: Behavior normal.

## 2025-06-11 RX ORDER — DAPAGLIFLOZIN 10 MG/1
TABLET, FILM COATED ORAL
Qty: 30 TABLET | Refills: 0 | COMMUNITY
Start: 2025-06-11

## 2025-06-11 ASSESSMENT — ENCOUNTER SYMPTOMS: VISUAL CHANGE: 1

## 2025-06-19 ENCOUNTER — TELEPHONE (OUTPATIENT)
Age: 50
End: 2025-06-19

## 2025-06-26 ENCOUNTER — COMMUNITY OUTREACH (OUTPATIENT)
Dept: PRIMARY CARE CLINIC | Age: 50
End: 2025-06-26

## (undated) DEVICE — TUBING IRRIGATION 140/160/180/190 SER GI ENDOSCP SMARTCAP

## (undated) DEVICE — DRAPE SURG BRACH STRL

## (undated) DEVICE — ENDO CARRY-ON PROCEDURE KIT: Brand: ENDO CARRY-ON PROCEDURE KIT

## (undated) DEVICE — HI-TORQUE SUPRA CORE .035 PERIPHERAL GUIDE WIRE .035 X 300 CM: Brand: HI-TORQUE SUPRA CORE

## (undated) DEVICE — KIT MFLD ISOLATN NACL CNTRST PRT TBNG SPIK W/ PRSS TRNSDUC

## (undated) DEVICE — TUBING, SUCTION, 1/4" X 10', STRAIGHT: Brand: MEDLINE

## (undated) DEVICE — 3M™ TEGADERM™ TRANSPARENT FILM DRESSING FRAME STYLE, 1626W, 4 IN X 4-3/4 IN (10 CM X 12 CM), 50/CT 4CT/CASE: Brand: 3M™ TEGADERM™

## (undated) DEVICE — GLOVE SURG SZ 6 THK91MIL LTX FREE SYN POLYISOPRENE ANTI

## (undated) DEVICE — BRUSH ENDO CLN L90.5IN SHTH DIA1.7MM BRIST DIA5-7MM 2-6MM

## (undated) DEVICE — KIT ANGIO W/ AT P65 PREM HND CTRL FOR CNTRST DEL ANGIOTOUCH

## (undated) DEVICE — GUIDEWIRE VASC L260CM DIA0.035IN TIP L3MM STD EXCHG PTFE J

## (undated) DEVICE — GUIDEWIRE VASC L180CM DIA0.035IN 3MM PTFE J TIP EXCHG FIX

## (undated) DEVICE — SINGLE PORT MANIFOLD: Brand: NEPTUNE 2

## (undated) DEVICE — DRESSING QUIKCLOT FEMORAL INTERVENTIONAL 1.5X1.5 IN

## (undated) DEVICE — 4FR MICROPUNCTURE KIT STIFFEN

## (undated) DEVICE — CONTAINER,SPECIMEN,OR STERILE,4OZ: Brand: MEDLINE

## (undated) DEVICE — Device

## (undated) DEVICE — TUBE SET 96 MM 64 MM H2O PERISTALTIC STD AUX CHANNEL

## (undated) DEVICE — TAPE RAD L500MM RADPQ FOR MEAS LESION LEN MRK LESION

## (undated) DEVICE — CONMED SCOPE SAVER BITE BLOCK, 20X27 MM: Brand: SCOPE SAVER

## (undated) DEVICE — GLOVE SURG SZ 65 THK91MIL LTX FREE SYN POLYISOPRENE

## (undated) DEVICE — GLOVE ORANGE PI 7 1/2   MSG9075

## (undated) DEVICE — TRIPLE-LOOP SNARE RETRIEVAL KIT: Brand: TRIPLE-LOOP SNARE RETRIEVAL KIT

## (undated) DEVICE — INTRODUCER SHTH 9FR CANN L11CM DIL TIP 35MM BLK TUNGSTEN

## (undated) DEVICE — INTRODUCER SHTH 0.018 IN 4 FRX40 CM KT SFT TIP NIT VSI 7266V